# Patient Record
Sex: FEMALE | Race: ASIAN | Employment: FULL TIME | URBAN - METROPOLITAN AREA
[De-identification: names, ages, dates, MRNs, and addresses within clinical notes are randomized per-mention and may not be internally consistent; named-entity substitution may affect disease eponyms.]

---

## 2017-04-15 ENCOUNTER — HOSPITAL ENCOUNTER (OUTPATIENT)
Age: 27
Discharge: HOME | End: 2017-04-15
Payer: COMMERCIAL

## 2017-04-15 VITALS
SYSTOLIC BLOOD PRESSURE: 99 MMHG | OXYGEN SATURATION: 99 % | DIASTOLIC BLOOD PRESSURE: 61 MMHG | RESPIRATION RATE: 20 BRPM | WEIGHT: 118 LBS | HEART RATE: 123 BPM | HEIGHT: 62 IN | TEMPERATURE: 101 F | BODY MASS INDEX: 21.71 KG/M2

## 2017-04-15 DIAGNOSIS — B34.9 VIRAL SYNDROME: Primary | ICD-10-CM

## 2017-04-15 PROCEDURE — 99214 OFFICE O/P EST MOD 30 MIN: CPT

## 2017-04-15 PROCEDURE — 87430 STREP A AG IA: CPT | Performed by: PHYSICIAN ASSISTANT

## 2017-04-15 PROCEDURE — 87081 CULTURE SCREEN ONLY: CPT | Performed by: PHYSICIAN ASSISTANT

## 2017-04-15 RX ORDER — OSELTAMIVIR PHOSPHATE 75 MG/1
75 CAPSULE ORAL 2 TIMES DAILY
Qty: 10 CAPSULE | Refills: 0 | Status: SHIPPED | OUTPATIENT
Start: 2017-04-15 | End: 2017-04-20

## 2017-04-15 NOTE — ED PROVIDER NOTES
Patient Seen in: THE MEDICAL [facility] In [facility]    History   Patient presents with:  Fever  Sore Throat    Stated Complaint: sore throat, fever, body aches    HPI    Patient is a pleasant 25-year-old female.   Yesterday evening, patient had rapid on Temp(Src) 100.5 °F (38.1 °C) (Oral)  Resp 20  Ht 157.5 cm (5' 2\")  Wt 53.524 kg  BMI 21.58 kg/m2  SpO2 99%  LMP 04/13/2017        Physical Exam    Gen: Well appearing, well groomed, alert and aware x 3  Neck: Supple, full range of motion, no thyromegaly o

## 2017-05-16 ENCOUNTER — OFFICE VISIT (OUTPATIENT)
Dept: OBGYN CLINIC | Facility: CLINIC | Age: 27
End: 2017-05-16

## 2017-05-16 VITALS
HEIGHT: 60.5 IN | HEART RATE: 84 BPM | WEIGHT: 121 LBS | BODY MASS INDEX: 23.14 KG/M2 | SYSTOLIC BLOOD PRESSURE: 98 MMHG | DIASTOLIC BLOOD PRESSURE: 56 MMHG

## 2017-05-16 DIAGNOSIS — Z12.39 BREAST CANCER SCREENING: ICD-10-CM

## 2017-05-16 DIAGNOSIS — Z12.4 CERVICAL CANCER SCREENING: ICD-10-CM

## 2017-05-16 DIAGNOSIS — Z01.419 ENCOUNTER FOR GYNECOLOGICAL EXAMINATION WITHOUT ABNORMAL FINDING: Primary | ICD-10-CM

## 2017-05-16 PROCEDURE — 99385 PREV VISIT NEW AGE 18-39: CPT | Performed by: OBSTETRICS & GYNECOLOGY

## 2017-05-16 PROCEDURE — 88175 CYTOPATH C/V AUTO FLUID REDO: CPT | Performed by: OBSTETRICS & GYNECOLOGY

## 2017-05-16 RX ORDER — AMOXICILLIN 875 MG/1
TABLET, COATED ORAL
Refills: 0 | COMMUNITY
Start: 2017-04-22 | End: 2017-05-16 | Stop reason: ALTCHOICE

## 2017-05-16 RX ORDER — OSELTAMIVIR PHOSPHATE 75 MG/1
CAPSULE ORAL
Refills: 0 | COMMUNITY
Start: 2017-04-15 | End: 2017-05-16 | Stop reason: ALTCHOICE

## 2017-05-16 NOTE — PROGRESS NOTES
GYN H&P     2017  4:23 PM    CC: Patient presents with:  Physical: no c/o's or concens      HPI: patient is a 28year old  here for her annual gyne exam.   She has no complaints. Menses are regular.    Denies any pelvic pain or irregular vagin breath and wheezing. Cardiovascular: Negative for chest pain, palpitations and leg swelling.    Gastrointestinal: Negative for nausea, vomiting, abdominal pain, diarrhea, blood in stool   Genitourinary: Negative for dysuria, hematuria and difficulty urina Request; Future    3.  Breast cancer screening  -normal breast exam. Start screening mammogram at age 35      Maurice Fletcher MD

## 2018-05-30 ENCOUNTER — TELEPHONE (OUTPATIENT)
Dept: OBGYN CLINIC | Facility: CLINIC | Age: 28
End: 2018-05-30

## 2018-05-30 NOTE — TELEPHONE ENCOUNTER
LMP: 18  EDC based on lmp: 19  8 wks on 18        Contacted patient. She reports +UPT yesterday. LMP etc as noted above. Denies any medical problems; states \"occasional migraine. \" No medications currently. Taking a PNV.  Encouraged DHA s

## 2018-06-25 ENCOUNTER — OFFICE VISIT (OUTPATIENT)
Dept: OBGYN CLINIC | Facility: CLINIC | Age: 28
End: 2018-06-25

## 2018-06-25 VITALS
BODY MASS INDEX: 23.14 KG/M2 | WEIGHT: 121 LBS | SYSTOLIC BLOOD PRESSURE: 120 MMHG | DIASTOLIC BLOOD PRESSURE: 60 MMHG | HEIGHT: 60.5 IN

## 2018-06-25 DIAGNOSIS — Z34.91 FIRST TRIMESTER PREGNANCY: Primary | ICD-10-CM

## 2018-06-25 DIAGNOSIS — Z11.3 SCREENING EXAMINATION FOR STD (SEXUALLY TRANSMITTED DISEASE): ICD-10-CM

## 2018-06-25 DIAGNOSIS — Z12.4 CERVICAL CANCER SCREENING: ICD-10-CM

## 2018-06-25 PROCEDURE — 87086 URINE CULTURE/COLONY COUNT: CPT | Performed by: OBSTETRICS & GYNECOLOGY

## 2018-06-25 PROCEDURE — 87591 N.GONORRHOEAE DNA AMP PROB: CPT | Performed by: OBSTETRICS & GYNECOLOGY

## 2018-06-25 PROCEDURE — 87491 CHLMYD TRACH DNA AMP PROBE: CPT | Performed by: OBSTETRICS & GYNECOLOGY

## 2018-06-25 RX ORDER — PRENATAL VIT/IRON FUM/FOLIC AC 27MG-0.8MG
1 TABLET ORAL DAILY
COMMUNITY
End: 2021-01-01

## 2018-06-25 NOTE — PROGRESS NOTES
Here for initial prenatal visit with our group. 33year old  at 8w0d by LMP c/w todays US. OB Hx: none  PMH:  migraines  PSxH:none    Patient's last menstrual period was 2018 (exact date). .     Last pap smear Pap Smear,3 Years due on

## 2018-06-26 ENCOUNTER — TELEPHONE (OUTPATIENT)
Dept: OBGYN CLINIC | Facility: CLINIC | Age: 28
End: 2018-06-26

## 2018-06-26 NOTE — TELEPHONE ENCOUNTER
, Reynold Owen called, his wife saw Dr. Barbie Kruse yesterday for new ob, Dr. Barbie Kruse gave  a booklet regarding genetic testing.   His insurance needs CPT code

## 2018-06-27 NOTE — TELEPHONE ENCOUNTER
Patient's  called requesting CPT code for [address]. [place] code of [address] given to patient's . Verbalized understanding. He will call with any questions or concerns.

## 2018-06-29 ENCOUNTER — PATIENT MESSAGE (OUTPATIENT)
Dept: OBGYN CLINIC | Facility: CLINIC | Age: 28
End: 2018-06-29

## 2018-06-29 DIAGNOSIS — Z3A.09 9 WEEKS GESTATION OF PREGNANCY: Primary | ICD-10-CM

## 2018-07-05 NOTE — TELEPHONE ENCOUNTER
From: Kathie Schwartz  To: Edvin Abbasi MD  Sent: 6/29/2018 6:58 PM CDT  Subject: Non-Urgent Medical Question    Hi Doctor,     You have ordered some tests (blood work) on last visit that was June 25.  I was told that the order was sent over

## 2018-07-06 NOTE — TELEPHONE ENCOUNTER
Discussed question with . He states that they want to do Genetic testing outside of [facility] since their insurance doesn't cover genetic testing.   He is okay with patient having her prenatal labs done at [facility] and is planning to go to one of the lab

## 2018-07-19 ENCOUNTER — LAB ENCOUNTER (OUTPATIENT)
Dept: LAB | Age: 28
End: 2018-07-19
Attending: OBSTETRICS & GYNECOLOGY
Payer: COMMERCIAL

## 2018-07-19 DIAGNOSIS — Z34.91 FIRST TRIMESTER PREGNANCY: ICD-10-CM

## 2018-07-19 DIAGNOSIS — Z3A.09 9 WEEKS GESTATION OF PREGNANCY: Primary | ICD-10-CM

## 2018-07-19 LAB
ANTIBODY SCREEN: NEGATIVE
HBV SURFACE AG SER-ACNC: <0.1 [IU]/L
HBV SURFACE AG SERPL QL IA: NONREACTIVE
RH BLOOD TYPE: POSITIVE
RUBV IGG SER QL: POSITIVE
RUBV IGG SER-ACNC: 182.1 IU/ML (ref 10–?)
T PALLIDUM AB SER QL IA: NONREACTIVE

## 2018-07-19 PROCEDURE — 86900 BLOOD TYPING SEROLOGIC ABO: CPT | Performed by: OBSTETRICS & GYNECOLOGY

## 2018-07-19 PROCEDURE — 87340 HEPATITIS B SURFACE AG IA: CPT | Performed by: OBSTETRICS & GYNECOLOGY

## 2018-07-19 PROCEDURE — 36415 COLL VENOUS BLD VENIPUNCTURE: CPT | Performed by: OBSTETRICS & GYNECOLOGY

## 2018-07-19 PROCEDURE — 86901 BLOOD TYPING SEROLOGIC RH(D): CPT | Performed by: OBSTETRICS & GYNECOLOGY

## 2018-07-19 PROCEDURE — 86780 TREPONEMA PALLIDUM: CPT | Performed by: OBSTETRICS & GYNECOLOGY

## 2018-07-19 PROCEDURE — 86850 RBC ANTIBODY SCREEN: CPT | Performed by: OBSTETRICS & GYNECOLOGY

## 2018-07-19 PROCEDURE — 86762 RUBELLA ANTIBODY: CPT | Performed by: OBSTETRICS & GYNECOLOGY

## 2018-07-19 PROCEDURE — 87389 HIV-1 AG W/HIV-1&-2 AB AG IA: CPT | Performed by: OBSTETRICS & GYNECOLOGY

## 2018-07-23 PROBLEM — Z34.91 ENCOUNTER FOR SUPERVISION OF NORMAL PREGNANCY IN FIRST TRIMESTER: Status: ACTIVE | Noted: 2018-07-23

## 2018-07-23 PROBLEM — Z34.91 ENCOUNTER FOR SUPERVISION OF NORMAL PREGNANCY IN FIRST TRIMESTER (HCC): Status: ACTIVE | Noted: 2018-07-23

## 2018-07-24 ENCOUNTER — TELEPHONE (OUTPATIENT)
Dept: OBGYN CLINIC | Facility: CLINIC | Age: 28
End: 2018-07-24

## 2018-07-24 ENCOUNTER — ROUTINE PRENATAL (OUTPATIENT)
Dept: OBGYN CLINIC | Facility: CLINIC | Age: 28
End: 2018-07-24
Payer: COMMERCIAL

## 2018-07-24 VITALS — SYSTOLIC BLOOD PRESSURE: 90 MMHG | BODY MASS INDEX: 23 KG/M2 | DIASTOLIC BLOOD PRESSURE: 54 MMHG | WEIGHT: 120 LBS

## 2018-07-24 DIAGNOSIS — Z34.91 ENCOUNTER FOR SUPERVISION OF NORMAL PREGNANCY IN FIRST TRIMESTER, UNSPECIFIED GRAVIDITY: Primary | ICD-10-CM

## 2018-07-24 NOTE — PROGRESS NOTES
LEXIS    Doing well. No complaints. Denies abdominal/pelvic pain or vaginal bleeding.    Rh positive   Genetic screening cffDNA negative  Recommend Anatomy scan 18-20 wks   Prenatal labs reviewed     RTC in 4 weeks

## 2018-08-21 ENCOUNTER — ROUTINE PRENATAL (OUTPATIENT)
Dept: OBGYN CLINIC | Facility: CLINIC | Age: 28
End: 2018-08-21
Payer: COMMERCIAL

## 2018-08-21 VITALS
SYSTOLIC BLOOD PRESSURE: 90 MMHG | BODY MASS INDEX: 23.91 KG/M2 | HEART RATE: 87 BPM | HEIGHT: 60.5 IN | DIASTOLIC BLOOD PRESSURE: 70 MMHG | WEIGHT: 125 LBS

## 2018-08-21 DIAGNOSIS — Z3A.16 16 WEEKS GESTATION OF PREGNANCY: Primary | ICD-10-CM

## 2018-08-21 DIAGNOSIS — Z34.02 ENCOUNTER FOR SUPERVISION OF NORMAL FIRST PREGNANCY IN SECOND TRIMESTER: ICD-10-CM

## 2018-08-21 NOTE — PATIENT INSTRUCTIONS
Medications Safe in Pregnancy  The following over-the-counter medications may be taken safely after 12 weeks gestation by any patient who is pregnant. Please follow the instructions on the package for adult dosage.   If you experience any symptoms christianne

## 2018-08-21 NOTE — PROGRESS NOTES
LEXIS  Doing well  No complaints.  No LOF/VB/uctx  Some allergies--otc meds reviewed  RH +  Genetic testing nl cf dna (first, quad/AFP)  Anatomy Scan on rtc (18-20weeks)

## 2018-09-21 ENCOUNTER — ROUTINE PRENATAL (OUTPATIENT)
Dept: OBGYN CLINIC | Facility: CLINIC | Age: 28
End: 2018-09-21
Payer: COMMERCIAL

## 2018-09-21 ENCOUNTER — APPOINTMENT (OUTPATIENT)
Dept: OBGYN CLINIC | Facility: CLINIC | Age: 28
End: 2018-09-21
Payer: COMMERCIAL

## 2018-09-21 VITALS — WEIGHT: 127 LBS | SYSTOLIC BLOOD PRESSURE: 96 MMHG | DIASTOLIC BLOOD PRESSURE: 70 MMHG | BODY MASS INDEX: 24 KG/M2

## 2018-09-21 DIAGNOSIS — Z36.9 ENCOUNTER FOR ANTENATAL SCREENING OF MOTHER: ICD-10-CM

## 2018-09-21 DIAGNOSIS — Z36.3 ANTENATAL SCREENING FOR MALFORMATION USING ULTRASONICS: ICD-10-CM

## 2018-09-21 DIAGNOSIS — Z23 NEED FOR VACCINATION: ICD-10-CM

## 2018-09-21 DIAGNOSIS — Z34.02 ENCOUNTER FOR SUPERVISION OF NORMAL FIRST PREGNANCY IN SECOND TRIMESTER: Primary | ICD-10-CM

## 2018-09-21 PROCEDURE — 76805 OB US >/= 14 WKS SNGL FETUS: CPT | Performed by: OBSTETRICS & GYNECOLOGY

## 2018-09-21 PROCEDURE — 90686 IIV4 VACC NO PRSV 0.5 ML IM: CPT | Performed by: OBSTETRICS & GYNECOLOGY

## 2018-09-21 PROCEDURE — 90471 IMMUNIZATION ADMIN: CPT | Performed by: OBSTETRICS & GYNECOLOGY

## 2018-09-21 NOTE — PROGRESS NOTES
Here for second trimester screening ultrasound. AUCATHY EGA 20w 3d giving ultrasound PAULINO of February 5, 2019. Cervical length 4.2 cm. Anterior placenta without previa. AFV normal.  Anatomical survery unremarkable.   Follow up ultrasound as clinically neede

## 2018-09-21 NOTE — PROGRESS NOTES
No problems since last seen. Not sure on FM. Unremarkable screening ultrasound. Carrying female. Flu vaccine received. GL next time. See in one month.

## 2018-10-17 ENCOUNTER — ROUTINE PRENATAL (OUTPATIENT)
Dept: OBGYN CLINIC | Facility: CLINIC | Age: 28
End: 2018-10-17
Payer: COMMERCIAL

## 2018-10-17 VITALS
DIASTOLIC BLOOD PRESSURE: 58 MMHG | BODY MASS INDEX: 25.44 KG/M2 | SYSTOLIC BLOOD PRESSURE: 100 MMHG | WEIGHT: 133 LBS | HEIGHT: 60.5 IN

## 2018-10-17 DIAGNOSIS — Z34.02 ENCOUNTER FOR SUPERVISION OF NORMAL FIRST PREGNANCY IN SECOND TRIMESTER: Primary | ICD-10-CM

## 2018-10-24 ENCOUNTER — LAB ENCOUNTER (OUTPATIENT)
Dept: LAB | Age: 28
End: 2018-10-24
Attending: OBSTETRICS & GYNECOLOGY
Payer: COMMERCIAL

## 2018-10-24 DIAGNOSIS — O99.810 ABNORMAL MATERNAL GLUCOSE TOLERANCE, ANTEPARTUM: Primary | ICD-10-CM

## 2018-10-24 DIAGNOSIS — Z36.9 ENCOUNTER FOR ANTENATAL SCREENING OF MOTHER: ICD-10-CM

## 2018-10-24 PROCEDURE — 36415 COLL VENOUS BLD VENIPUNCTURE: CPT | Performed by: OBSTETRICS & GYNECOLOGY

## 2018-10-24 PROCEDURE — 85025 COMPLETE CBC W/AUTO DIFF WBC: CPT | Performed by: OBSTETRICS & GYNECOLOGY

## 2018-10-24 PROCEDURE — 82950 GLUCOSE TEST: CPT | Performed by: OBSTETRICS & GYNECOLOGY

## 2018-10-24 NOTE — PROGRESS NOTES
Patient informed of results. Verbalized understanding. No further questions or concerns. Order placed.

## 2018-10-27 ENCOUNTER — LABORATORY ENCOUNTER (OUTPATIENT)
Dept: LAB | Facility: HOSPITAL | Age: 28
End: 2018-10-27
Attending: OBSTETRICS & GYNECOLOGY
Payer: COMMERCIAL

## 2018-10-27 DIAGNOSIS — O99.810 ABNORMAL MATERNAL GLUCOSE TOLERANCE, ANTEPARTUM: ICD-10-CM

## 2018-10-27 PROCEDURE — 82952 GTT-ADDED SAMPLES: CPT

## 2018-10-27 PROCEDURE — 36415 COLL VENOUS BLD VENIPUNCTURE: CPT

## 2018-10-27 PROCEDURE — 82962 GLUCOSE BLOOD TEST: CPT

## 2018-10-27 PROCEDURE — 82951 GLUCOSE TOLERANCE TEST (GTT): CPT

## 2018-11-14 ENCOUNTER — ROUTINE PRENATAL (OUTPATIENT)
Dept: OBGYN CLINIC | Facility: CLINIC | Age: 28
End: 2018-11-14
Payer: COMMERCIAL

## 2018-11-14 VITALS — DIASTOLIC BLOOD PRESSURE: 66 MMHG | SYSTOLIC BLOOD PRESSURE: 104 MMHG | BODY MASS INDEX: 26 KG/M2 | WEIGHT: 135 LBS

## 2018-11-14 DIAGNOSIS — Z34.03 ENCOUNTER FOR SUPERVISION OF NORMAL FIRST PREGNANCY IN THIRD TRIMESTER: ICD-10-CM

## 2018-11-14 DIAGNOSIS — Z3A.28 28 WEEKS GESTATION OF PREGNANCY: Primary | ICD-10-CM

## 2018-11-14 NOTE — PROGRESS NOTES
LEXIS  Doing well  RH +  S/P Anatomy scan nl  1 hr glucose (24-28wks) nl 3 hr gtt  TDAP recommended during pregnancy and instructions given, for next visit  RTC 2 wks

## 2018-11-21 ENCOUNTER — TELEPHONE (OUTPATIENT)
Dept: OBGYN CLINIC | Facility: CLINIC | Age: 28
End: 2018-11-21

## 2018-11-21 ENCOUNTER — HOSPITAL ENCOUNTER (OUTPATIENT)
Age: 28
Discharge: HOME | End: 2018-11-21
Attending: FAMILY MEDICINE
Payer: COMMERCIAL

## 2018-11-21 VITALS
BODY MASS INDEX: 24.84 KG/M2 | OXYGEN SATURATION: 98 % | RESPIRATION RATE: 18 BRPM | SYSTOLIC BLOOD PRESSURE: 109 MMHG | HEIGHT: 62 IN | WEIGHT: 135 LBS | DIASTOLIC BLOOD PRESSURE: 59 MMHG | HEART RATE: 97 BPM | TEMPERATURE: 98 F

## 2018-11-21 DIAGNOSIS — Z34.92 SECOND TRIMESTER PREGNANCY: ICD-10-CM

## 2018-11-21 DIAGNOSIS — K21.9 GASTROESOPHAGEAL REFLUX DISEASE, ESOPHAGITIS PRESENCE NOT SPECIFIED: ICD-10-CM

## 2018-11-21 DIAGNOSIS — K52.9 GASTROENTERITIS: Primary | ICD-10-CM

## 2018-11-21 PROCEDURE — 99215 OFFICE O/P EST HI 40 MIN: CPT

## 2018-11-21 PROCEDURE — 99214 OFFICE O/P EST MOD 30 MIN: CPT

## 2018-11-21 PROCEDURE — 87086 URINE CULTURE/COLONY COUNT: CPT

## 2018-11-21 PROCEDURE — 85025 COMPLETE CBC W/AUTO DIFF WBC: CPT | Performed by: FAMILY MEDICINE

## 2018-11-21 PROCEDURE — 81002 URINALYSIS NONAUTO W/O SCOPE: CPT

## 2018-11-21 PROCEDURE — 96360 HYDRATION IV INFUSION INIT: CPT

## 2018-11-21 PROCEDURE — 80047 BASIC METABLC PNL IONIZED CA: CPT

## 2018-11-21 RX ORDER — SODIUM CHLORIDE 9 MG/ML
1000 INJECTION, SOLUTION INTRAVENOUS ONCE
Status: COMPLETED | OUTPATIENT
Start: 2018-11-21 | End: 2018-11-21

## 2018-11-21 RX ORDER — ONDANSETRON 4 MG/1
4 TABLET, ORALLY DISINTEGRATING ORAL ONCE
Status: COMPLETED | OUTPATIENT
Start: 2018-11-21 | End: 2018-11-21

## 2018-11-21 RX ORDER — ONDANSETRON 4 MG/1
4 TABLET, ORALLY DISINTEGRATING ORAL EVERY 6 HOURS PRN
Qty: 10 TABLET | Refills: 0 | Status: SHIPPED | OUTPATIENT
Start: 2018-11-21 | End: 2018-11-24

## 2018-11-21 NOTE — TELEPHONE ENCOUNTER
29W2D called c/o diarrhea. She states she has had 4 episodes since 3 AM. She states she had a falafel sandwich and milk before bed. She is able to drink water at this time. She states the baby is moving well.  Denies any vaginal bleeding, cramping, or

## 2018-11-21 NOTE — ED INITIAL ASSESSMENT (HPI)
C/O abdominal pain and diarrhea that started during the night. Has tried Tylenol w/o any relief of pain. Is tolerating fluids.

## 2018-11-21 NOTE — ED PROVIDER NOTES
Patient Seen in: THE [facility] In [facility]    History   Patient presents with:  Diarrhea  Abdominal Pain    Stated Complaint: patient is 29 weeks pregnant with abd pain and diar since 3 am    HPI  32 yo F here with complaints of watery diarrhe hyperactive BS+, no tenderness that can be localized at this time.  Not distended, Diffuse lower abdominal tenderness +   NEURO: Alert and oriented to person place and time  GAIT: Normal      ED Course     Labs Reviewed   POCT URINALYSIS DIPSTICK - Abnormal Signs of dehydration discussed and if so please return immediately   BRAT diet emphasized - Bananas, Rice, Applesauce and Toast for the next 48 hours and then advance diet slowly   OTC Probiotic Culturelle OR Florastor discussed     If continuing watery

## 2018-11-21 NOTE — ED NOTES
Pt states she is having pain in epigastric area now. Dr. Yolanda Galeana aware. Warm blanket  provided. Pt made comfortable. Bed on low fowlers. Lights dimmed. 1 side rail up. Call light within reach.   IV infusing well

## 2018-11-28 ENCOUNTER — TELEPHONE (OUTPATIENT)
Dept: OBGYN CLINIC | Facility: CLINIC | Age: 28
End: 2018-11-28

## 2018-11-28 ENCOUNTER — ROUTINE PRENATAL (OUTPATIENT)
Dept: OBGYN CLINIC | Facility: CLINIC | Age: 28
End: 2018-11-28
Payer: COMMERCIAL

## 2018-11-28 ENCOUNTER — APPOINTMENT (OUTPATIENT)
Dept: LAB | Age: 28
End: 2018-11-28
Attending: OBSTETRICS & GYNECOLOGY
Payer: COMMERCIAL

## 2018-11-28 VITALS — BODY MASS INDEX: 25 KG/M2 | SYSTOLIC BLOOD PRESSURE: 108 MMHG | WEIGHT: 136 LBS | DIASTOLIC BLOOD PRESSURE: 60 MMHG

## 2018-11-28 DIAGNOSIS — Z34.03 ENCOUNTER FOR SUPERVISION OF NORMAL FIRST PREGNANCY IN THIRD TRIMESTER: Primary | ICD-10-CM

## 2018-11-28 DIAGNOSIS — Z34.03 ENCOUNTER FOR SUPERVISION OF NORMAL FIRST PREGNANCY IN THIRD TRIMESTER: ICD-10-CM

## 2018-11-28 DIAGNOSIS — Z23 NEED FOR VACCINATION: ICD-10-CM

## 2018-11-28 PROCEDURE — 36415 COLL VENOUS BLD VENIPUNCTURE: CPT | Performed by: OBSTETRICS & GYNECOLOGY

## 2018-11-28 PROCEDURE — 87389 HIV-1 AG W/HIV-1&-2 AB AG IA: CPT | Performed by: OBSTETRICS & GYNECOLOGY

## 2018-11-28 PROCEDURE — 90471 IMMUNIZATION ADMIN: CPT | Performed by: OBSTETRICS & GYNECOLOGY

## 2018-11-28 PROCEDURE — 90715 TDAP VACCINE 7 YRS/> IM: CPT | Performed by: OBSTETRICS & GYNECOLOGY

## 2018-11-28 NOTE — TELEPHONE ENCOUNTER
Patient dropped off in [address] forms. Pd $25.00 visa. Please fax forms and pt will  at next appt.  Put in bin in [place] for completion

## 2018-12-06 ENCOUNTER — MED REC SCAN ONLY (OUTPATIENT)
Dept: OBGYN CLINIC | Facility: CLINIC | Age: 28
End: 2018-12-06

## 2018-12-06 NOTE — TELEPHONE ENCOUNTER
Forms completed and signed by Dr. Prem Dempsey.      Faxed to [place] at [phone]    Copy to scan  Copy to file in [place]    Copy faxed to [place] for patient to  at [facility] on 12/14/18

## 2018-12-14 ENCOUNTER — ROUTINE PRENATAL (OUTPATIENT)
Dept: OBGYN CLINIC | Facility: CLINIC | Age: 28
End: 2018-12-14
Payer: COMMERCIAL

## 2018-12-14 VITALS — WEIGHT: 139 LBS | SYSTOLIC BLOOD PRESSURE: 110 MMHG | BODY MASS INDEX: 25 KG/M2 | DIASTOLIC BLOOD PRESSURE: 56 MMHG

## 2018-12-14 DIAGNOSIS — Z34.03 ENCOUNTER FOR SUPERVISION OF NORMAL FIRST PREGNANCY IN THIRD TRIMESTER: Primary | ICD-10-CM

## 2018-12-28 ENCOUNTER — ROUTINE PRENATAL (OUTPATIENT)
Dept: OBGYN CLINIC | Facility: CLINIC | Age: 28
End: 2018-12-28
Payer: COMMERCIAL

## 2018-12-28 VITALS — SYSTOLIC BLOOD PRESSURE: 108 MMHG | BODY MASS INDEX: 26 KG/M2 | WEIGHT: 143 LBS | DIASTOLIC BLOOD PRESSURE: 68 MMHG

## 2018-12-28 DIAGNOSIS — Z3A.34 34 WEEKS GESTATION OF PREGNANCY: Primary | ICD-10-CM

## 2018-12-28 DIAGNOSIS — Z34.03 ENCOUNTER FOR SUPERVISION OF NORMAL FIRST PREGNANCY IN THIRD TRIMESTER: ICD-10-CM

## 2018-12-28 NOTE — PROGRESS NOTES
LEXIS  Doing well, +FM  Denies VB/LOF/uctx  Rh +, TDAP received, EPDS  RTC in 1 wks  Fetal movement instructions given  gbbs on rtc

## 2018-12-28 NOTE — PATIENT INSTRUCTIONS
FETAL MOVEMENT CHART    Begin counting fetal movements at 32 weeks of pregnancy. You may find that your baby is more active after eating or drinking. We want you to time how long it takes to feel 10 movements (kicks, flutters, swishes or rolls).   Wyatt Morales

## 2019-01-04 ENCOUNTER — ROUTINE PRENATAL (OUTPATIENT)
Dept: OBGYN CLINIC | Facility: CLINIC | Age: 29
End: 2019-01-04
Payer: COMMERCIAL

## 2019-01-04 VITALS — BODY MASS INDEX: 26 KG/M2 | SYSTOLIC BLOOD PRESSURE: 104 MMHG | DIASTOLIC BLOOD PRESSURE: 68 MMHG | WEIGHT: 143 LBS

## 2019-01-04 DIAGNOSIS — Z34.03 ENCOUNTER FOR SUPERVISION OF NORMAL FIRST PREGNANCY IN THIRD TRIMESTER: Primary | ICD-10-CM

## 2019-01-04 PROCEDURE — 87081 CULTURE SCREEN ONLY: CPT | Performed by: NURSE PRACTITIONER

## 2019-01-04 PROCEDURE — 87653 STREP B DNA AMP PROBE: CPT | Performed by: NURSE PRACTITIONER

## 2019-01-04 NOTE — PROGRESS NOTES
LEXIS  Doing well, +FM  Denies VB/LOF/uctx  Mode of delivery:  anticipated  Labor precautions discussed  GBS collected   RTC 1 week

## 2019-01-09 ENCOUNTER — ROUTINE PRENATAL (OUTPATIENT)
Dept: OBGYN CLINIC | Facility: CLINIC | Age: 29
End: 2019-01-09
Payer: COMMERCIAL

## 2019-01-09 VITALS — WEIGHT: 143 LBS | SYSTOLIC BLOOD PRESSURE: 100 MMHG | BODY MASS INDEX: 26 KG/M2 | DIASTOLIC BLOOD PRESSURE: 60 MMHG

## 2019-01-09 DIAGNOSIS — Z3A.36 36 WEEKS GESTATION OF PREGNANCY: Primary | ICD-10-CM

## 2019-01-09 NOTE — PROGRESS NOTES
LEXIS  Doing well, +FM  Denies VB/LOF/uctx  Mode of delivery:   anticipated  SVE deferred   GBS neg  RTC 1 week  Size less than dates.  Growth US on LEXIS

## 2019-01-17 ENCOUNTER — ROUTINE PRENATAL (OUTPATIENT)
Dept: OBGYN CLINIC | Facility: CLINIC | Age: 29
End: 2019-01-17
Payer: COMMERCIAL

## 2019-01-17 ENCOUNTER — ULTRASOUND ENCOUNTER (OUTPATIENT)
Dept: OBGYN CLINIC | Facility: CLINIC | Age: 29
End: 2019-01-17
Payer: COMMERCIAL

## 2019-01-17 VITALS
HEIGHT: 61.5 IN | WEIGHT: 146.81 LBS | DIASTOLIC BLOOD PRESSURE: 62 MMHG | BODY MASS INDEX: 27.36 KG/M2 | SYSTOLIC BLOOD PRESSURE: 100 MMHG

## 2019-01-17 DIAGNOSIS — Z34.01 ENCOUNTER FOR SUPERVISION OF NORMAL FIRST PREGNANCY IN FIRST TRIMESTER: Primary | ICD-10-CM

## 2019-01-17 DIAGNOSIS — O26.849 FETAL SIZE INCONSISTENT WITH DATES: ICD-10-CM

## 2019-01-17 PROCEDURE — 76816 OB US FOLLOW-UP PER FETUS: CPT | Performed by: OBSTETRICS & GYNECOLOGY

## 2019-01-17 PROCEDURE — 76819 FETAL BIOPHYS PROFIL W/O NST: CPT | Performed by: OBSTETRICS & GYNECOLOGY

## 2019-01-17 NOTE — PROGRESS NOTES
LEXIS  Doing well, +FM  Denies LOF/VB/uctx  Mode of delivery:  anticipated  SVE 0/50/-3  GBS neg  Fetal movement count given  Size less than dates, growth scan today 35th%. KERRIE 7.43 cm. Repeat in 1 week. Encourage PO hydration. BPP 8/8.      RTC 1 week

## 2019-01-17 NOTE — PATIENT INSTRUCTIONS
Labor Instructions    How do I know if it’s true labor? • One of the most important aspects of any pregnancy is being able to recognize the onset of labor.   Unfortunately, on occasion it can be difficult or confusing, especially if you have had one or mor of the contractions. Having regular (usually closer), longer lasting (35-70 seconds), and sharper (more painful) contractions are the common symptoms of actual labor.   The second way in which labor can begin which occurs in approximately 30% of all patien the room during your labor. During the delivery, the nurses will inform you of the hospital policy and how many coaches are allowed. You may desire pain medication or anesthesia for pain.   You probably discussed some aspects of pain medication with us dur Please call the office within a few days after you are discharged from the hospital to schedule your post-partum visit, which is usually 4-6 weeks after delivery. Any medications necessary will be discussed on an individual basis.   If you decide to willam Time M T W Th F S S                                                                                                           Time M T W Th

## 2019-01-24 ENCOUNTER — ROUTINE PRENATAL (OUTPATIENT)
Dept: OBGYN CLINIC | Facility: CLINIC | Age: 29
End: 2019-01-24
Payer: COMMERCIAL

## 2019-01-24 VITALS
SYSTOLIC BLOOD PRESSURE: 102 MMHG | HEIGHT: 61.5 IN | WEIGHT: 146.38 LBS | DIASTOLIC BLOOD PRESSURE: 60 MMHG | BODY MASS INDEX: 27.28 KG/M2

## 2019-01-24 DIAGNOSIS — Z3A.38 38 WEEKS GESTATION OF PREGNANCY: Primary | ICD-10-CM

## 2019-01-24 DIAGNOSIS — O28.8 AFI (AMNIOTIC FLUID INDEX) BORDERLINE LOW: ICD-10-CM

## 2019-01-24 PROCEDURE — 76819 FETAL BIOPHYS PROFIL W/O NST: CPT | Performed by: OBSTETRICS & GYNECOLOGY

## 2019-01-24 NOTE — PATIENT INSTRUCTIONS
Labor Instructions    How do I know if it’s true labor? • One of the most important aspects of any pregnancy is being able to recognize the onset of labor.   Unfortunately, on occasion it can be difficult or confusing, especially if you have had one or mor of the contractions. Having regular (usually closer), longer lasting (35-70 seconds), and sharper (more painful) contractions are the common symptoms of actual labor.   The second way in which labor can begin which occurs in approximately 30% of all patien care.  The various options may also have been discussed in Prenatal Classes. We utilize IV narcotics and epidural anesthesia when our patients request to have them. If you chose to have no anesthesia, none will be administered.   A local anesthetic may be you should continue your prenatal vitamins. If you do not breastfeed, simply finish the prenatal vitamins you have. The staff at [facility] wish you a joyous and exciting birth.   If there is anything we can do to make this a better e

## 2019-02-01 ENCOUNTER — ROUTINE PRENATAL (OUTPATIENT)
Dept: OBGYN CLINIC | Facility: CLINIC | Age: 29
End: 2019-02-01
Payer: COMMERCIAL

## 2019-02-01 ENCOUNTER — HOSPITAL ENCOUNTER (INPATIENT)
Facility: HOSPITAL | Age: 29
LOS: 3 days | Discharge: HOME | End: 2019-02-04
Attending: OBSTETRICS & GYNECOLOGY | Admitting: OBSTETRICS & GYNECOLOGY
Payer: COMMERCIAL

## 2019-02-01 VITALS
DIASTOLIC BLOOD PRESSURE: 66 MMHG | SYSTOLIC BLOOD PRESSURE: 118 MMHG | WEIGHT: 150.63 LBS | HEIGHT: 61.5 IN | BODY MASS INDEX: 28.07 KG/M2

## 2019-02-01 DIAGNOSIS — Z34.03 ENCOUNTER FOR SUPERVISION OF NORMAL FIRST PREGNANCY IN THIRD TRIMESTER: Primary | ICD-10-CM

## 2019-02-01 DIAGNOSIS — O26.843 SIZE OF FETUS INCONSISTENT WITH DATES IN THIRD TRIMESTER: ICD-10-CM

## 2019-02-01 PROBLEM — Z34.90 PREGNANCY (HCC): Status: ACTIVE | Noted: 2019-02-01

## 2019-02-01 PROBLEM — Z34.90 PREGNANCY: Status: ACTIVE | Noted: 2019-02-01

## 2019-02-01 LAB
ANTIBODY SCREEN: NEGATIVE
DEPRECATED RDW RBC AUTO: 41 FL (ref 35.1–46.3)
ERYTHROCYTE [DISTWIDTH] IN BLOOD BY AUTOMATED COUNT: 13.5 % (ref 11–15)
HCT VFR BLD AUTO: 35.3 % (ref 35–48)
HGB BLD-MCNC: 11.8 G/DL (ref 12–16)
MCH RBC QN AUTO: 28 PG (ref 26–34)
MCHC RBC AUTO-ENTMCNC: 33.4 G/DL (ref 31–37)
MCV RBC AUTO: 83.6 FL (ref 80–100)
PLATELET # BLD AUTO: 253 10(3)UL (ref 150–450)
RBC # BLD AUTO: 4.22 X10(6)UL (ref 3.8–5.3)
RH BLOOD TYPE: POSITIVE
T PALLIDUM AB SER QL IA: NONREACTIVE
WBC # BLD AUTO: 10.4 X10(3) UL (ref 4–11)

## 2019-02-01 PROCEDURE — 3E033VJ INTRODUCTION OF OTHER HORMONE INTO PERIPHERAL VEIN, PERCUTANEOUS APPROACH: ICD-10-PCS | Performed by: OBSTETRICS & GYNECOLOGY

## 2019-02-01 PROCEDURE — 10907ZC DRAINAGE OF AMNIOTIC FLUID, THERAPEUTIC FROM PRODUCTS OF CONCEPTION, VIA NATURAL OR ARTIFICIAL OPENING: ICD-10-PCS | Performed by: OBSTETRICS & GYNECOLOGY

## 2019-02-01 PROCEDURE — 76815 OB US LIMITED FETUS(S): CPT | Performed by: NURSE PRACTITIONER

## 2019-02-01 RX ORDER — DEXTROSE, SODIUM CHLORIDE, SODIUM LACTATE, POTASSIUM CHLORIDE, AND CALCIUM CHLORIDE 5; .6; .31; .03; .02 G/100ML; G/100ML; G/100ML; G/100ML; G/100ML
INJECTION, SOLUTION INTRAVENOUS CONTINUOUS
Status: DISCONTINUED | OUTPATIENT
Start: 2019-02-01 | End: 2019-02-02

## 2019-02-01 RX ORDER — TRISODIUM CITRATE DIHYDRATE AND CITRIC ACID MONOHYDRATE 500; 334 MG/5ML; MG/5ML
30 SOLUTION ORAL AS NEEDED
Status: DISCONTINUED | OUTPATIENT
Start: 2019-02-01 | End: 2019-02-02

## 2019-02-01 RX ORDER — SODIUM CHLORIDE, SODIUM LACTATE, POTASSIUM CHLORIDE, CALCIUM CHLORIDE 600; 310; 30; 20 MG/100ML; MG/100ML; MG/100ML; MG/100ML
INJECTION, SOLUTION INTRAVENOUS CONTINUOUS
Status: DISCONTINUED | OUTPATIENT
Start: 2019-02-01 | End: 2019-02-02

## 2019-02-01 RX ORDER — TERBUTALINE SULFATE 1 MG/ML
0.25 INJECTION, SOLUTION SUBCUTANEOUS AS NEEDED
Status: DISCONTINUED | OUTPATIENT
Start: 2019-02-01 | End: 2019-02-02

## 2019-02-01 RX ORDER — NALBUPHINE HCL 10 MG/ML
2.5 AMPUL (ML) INJECTION
Status: DISCONTINUED | OUTPATIENT
Start: 2019-02-01 | End: 2019-02-02

## 2019-02-01 RX ORDER — AMMONIA INHALANTS 0.04 G/.3ML
0.3 INHALANT RESPIRATORY (INHALATION) AS NEEDED
Status: DISCONTINUED | OUTPATIENT
Start: 2019-02-01 | End: 2019-02-02

## 2019-02-01 RX ORDER — EPHEDRINE SULFATE/0.9% NACL/PF 25 MG/5 ML
5 SYRINGE (ML) INTRAVENOUS AS NEEDED
Status: DISCONTINUED | OUTPATIENT
Start: 2019-02-01 | End: 2019-02-02

## 2019-02-01 RX ORDER — IBUPROFEN 600 MG/1
600 TABLET ORAL ONCE AS NEEDED
Status: DISCONTINUED | OUTPATIENT
Start: 2019-02-01 | End: 2019-02-02

## 2019-02-01 NOTE — PROGRESS NOTES
LEXIS  Doing well, +FM  Denies VB/LOF/uctx  Mode of delivery:   anticipated  Labor precautions discussed  RTC 3 days with NST  Emphasized the importance of fetal movement

## 2019-02-01 NOTE — H&P
[facility]  Obstetrics and Gynecology  History & Physical    [address] Sadia Michael Grace Nazariojoeabdoul Patient Status:  Inpatient    1990 MRN [id number]   Location [address] [place] [address] Attending GuiFigueroa Sharma [facility] Day 0 PCP Bk VELAZCO Si Father      Social History: Social History    Tobacco Use      Smoking status: Never Smoker      Smokeless tobacco: Never Used    Alcohol use: No      Alcohol/week: 0.0 oz      Home Meds:   Medications Prior to Admission:  PRENATAL 27-0.8 MG Oral Tab Take detail. All questions answered, all appropriate consents will be signed at the Hospital. Admission is planned for today.  anticipated.     MD Siobhan   [address]

## 2019-02-01 NOTE — PLAN OF CARE
Problem: Patient/Family Goals  Goal: Patient/Family Long Term Goal  Patient's Long Term Goal: Safe delivery of infant    Interventions:  - See additional Care Plan goals for specific interventions  Outcome: Progressing    Goal: Patient/Family Short Term Go

## 2019-02-01 NOTE — PROGRESS NOTES
Pt is a 26year old female admitted to 105/-A. Patient presents with:  Scheduled Induction     Pt is  39w4d intra-uterine pregnancy. History obtained, consents signed.  Oriented to room, staff, and plan of care  Pt sent from Office today to LDR

## 2019-02-02 PROCEDURE — 59400 OBSTETRICAL CARE: CPT | Performed by: OBSTETRICS & GYNECOLOGY

## 2019-02-02 PROCEDURE — 0KQM0ZZ REPAIR PERINEUM MUSCLE, OPEN APPROACH: ICD-10-PCS | Performed by: OBSTETRICS & GYNECOLOGY

## 2019-02-02 RX ORDER — SIMETHICONE 80 MG
80 TABLET,CHEWABLE ORAL 3 TIMES DAILY PRN
Status: DISCONTINUED | OUTPATIENT
Start: 2019-02-02 | End: 2019-02-04

## 2019-02-02 RX ORDER — BISACODYL 10 MG
10 SUPPOSITORY, RECTAL RECTAL ONCE AS NEEDED
Status: ACTIVE | OUTPATIENT
Start: 2019-02-02 | End: 2019-02-02

## 2019-02-02 RX ORDER — IBUPROFEN 600 MG/1
600 TABLET ORAL EVERY 6 HOURS
Status: DISCONTINUED | OUTPATIENT
Start: 2019-02-02 | End: 2019-02-04

## 2019-02-02 RX ORDER — ACETAMINOPHEN 325 MG/1
650 TABLET ORAL EVERY 6 HOURS PRN
Status: DISCONTINUED | OUTPATIENT
Start: 2019-02-02 | End: 2019-02-04

## 2019-02-02 RX ORDER — ZOLPIDEM TARTRATE 5 MG/1
5 TABLET ORAL NIGHTLY PRN
Status: DISCONTINUED | OUTPATIENT
Start: 2019-02-02 | End: 2019-02-04

## 2019-02-02 RX ORDER — DOCUSATE SODIUM 100 MG/1
100 CAPSULE, LIQUID FILLED ORAL
Status: DISCONTINUED | OUTPATIENT
Start: 2019-02-02 | End: 2019-02-04

## 2019-02-02 NOTE — PROGRESS NOTES
Comfortable with some rectal pressure  fht cat I   Ctx q 2 min   Sve: complete with caput at +2  Will start pushing after louise catheter removed.

## 2019-02-02 NOTE — L&D DELIVERY NOTE
Vaginal Delivery Note          Jeramy Horowitz Patient Status:  Inpatient    1990 MRN [id number]   Location [address] [facility] Attending Devin Worrell, [address] [facility] [address] Day # 1 PCP García Harrison MD     Date of Delivery: 19 vicryl. Bleeding was minimal.  The patient was then moved to the supine position in stable condition. Sponge and instrument counts were correct. Complications:  None     Mother and infant in good condition.     MD Siobhan   [address]

## 2019-02-02 NOTE — PROGRESS NOTES
Pt comfortable with epidural   Ctx q 2-3 min   fht cat I   Sve: 5/100/-1   Continue pit   Peanut ball

## 2019-02-02 NOTE — PROGRESS NOTES
ADMISSION NOTE  Patient admitted to room in stable condition via: wheelchair    Oriented to room, Safety precautions initiated. Bed in low position, call light in reach.  Report received and ID Bands checked & verified with: Rober Scott RN  Plan of care and safe

## 2019-02-02 NOTE — PROGRESS NOTES
Pt transferred to Mother Baby room 5475 in stable condition. Report given to Riley. Infant transferred with mother in stable condition.

## 2019-02-03 LAB
BASOPHILS # BLD AUTO: 0.04 X10(3) UL (ref 0–0.2)
BASOPHILS NFR BLD AUTO: 0.3 %
DEPRECATED RDW RBC AUTO: 42.3 FL (ref 35.1–46.3)
EOSINOPHIL # BLD AUTO: 0.12 X10(3) UL (ref 0–0.7)
EOSINOPHIL NFR BLD AUTO: 0.8 %
ERYTHROCYTE [DISTWIDTH] IN BLOOD BY AUTOMATED COUNT: 13.6 % (ref 11–15)
HCT VFR BLD AUTO: 31.7 % (ref 35–48)
HGB BLD-MCNC: 10.4 G/DL (ref 12–16)
IMM GRANULOCYTES # BLD AUTO: 0.15 X10(3) UL (ref 0–1)
IMM GRANULOCYTES NFR BLD: 1 %
LYMPHOCYTES # BLD AUTO: 2.34 X10(3) UL (ref 1–4)
LYMPHOCYTES NFR BLD AUTO: 16.3 %
MCH RBC QN AUTO: 28 PG (ref 26–34)
MCHC RBC AUTO-ENTMCNC: 32.8 G/DL (ref 31–37)
MCV RBC AUTO: 85.4 FL (ref 80–100)
MONOCYTES # BLD AUTO: 0.94 X10(3) UL (ref 0.1–1)
MONOCYTES NFR BLD AUTO: 6.5 %
NEUTROPHILS # BLD AUTO: 10.81 X10 (3) UL (ref 1.5–7.7)
NEUTROPHILS # BLD AUTO: 10.81 X10(3) UL (ref 1.5–7.7)
NEUTROPHILS NFR BLD AUTO: 75.1 %
PLATELET # BLD AUTO: 198 10(3)UL (ref 150–450)
RBC # BLD AUTO: 3.71 X10(6)UL (ref 3.8–5.3)
WBC # BLD AUTO: 14.4 X10(3) UL (ref 4–11)

## 2019-02-03 NOTE — PLAN OF CARE
Problem: POSTPARTUM  Goal: Experiences normal breast weaning course  INTERVENTIONS:  - Assess for and manage engorgement. - Instruct on breast care. - Provide comfort measures.   Outcome: Completed Date Met: 02/02/19  This is a breastfeeding mom

## 2019-02-03 NOTE — PLAN OF CARE
Problem: POSTPARTUM  Goal: Optimize infant feeding at the breast  INTERVENTIONS:  - Initiate breast feeding within first hour after birth. - Monitor effectiveness of current breast feeding efforts. - Assess support systems available to mother/family.   - emotional status and coping mechanisms. - Encourage caregiver to participate in  daily care. - Assess support systems available to mother/family.  - Provide /case management support as needed.   Outcome: Progressing

## 2019-02-04 ENCOUNTER — TELEPHONE (OUTPATIENT)
Dept: OBGYN CLINIC | Facility: CLINIC | Age: 29
End: 2019-02-04

## 2019-02-04 VITALS
HEIGHT: 61.5 IN | RESPIRATION RATE: 16 BRPM | BODY MASS INDEX: 27.96 KG/M2 | DIASTOLIC BLOOD PRESSURE: 79 MMHG | SYSTOLIC BLOOD PRESSURE: 125 MMHG | WEIGHT: 150 LBS | TEMPERATURE: 98 F | OXYGEN SATURATION: 99 % | HEART RATE: 108 BPM

## 2019-02-04 PROBLEM — Z34.90 PREGNANCY (HCC): Status: RESOLVED | Noted: 2019-02-01 | Resolved: 2019-02-04

## 2019-02-04 PROBLEM — Z34.90 PREGNANCY: Status: RESOLVED | Noted: 2019-02-01 | Resolved: 2019-02-04

## 2019-02-04 NOTE — DISCHARGE SUMMARY
[facility]  Discharge Summary    [address] [place] [address] Patient Status:  inpatient    1990 MRN [id number]   Location 2217/2217-A Attending [facility] [address] [facility] Day # 3 PCP Chito Baer MD     Date of Admission: 2019    Date of Discharge:

## 2019-02-04 NOTE — TELEPHONE ENCOUNTER
Pt's spouse dropped off paperwork for completion    Only 1 section    Please fax when completed    Call Chalo when completed

## 2019-02-04 NOTE — TELEPHONE ENCOUNTER
Patient's spouse needs paper work filled out today. He also needs proof of birth letter for his insurance company.  Please call patient or spouse when both are completed

## 2019-02-04 NOTE — PROGRESS NOTES
[facility]  Post-Partum Progress Note    Jaun Beasley Patient Status:  Inpatient    1990 MRN [id number]   [facility] 2SW-J Attending Kevin Yanez MD   Hosp Day # 3 PCP Bk Byrne MD     SUBJECTIVE:    Postpartum

## 2019-02-05 NOTE — TELEPHONE ENCOUNTER
Form was signed by Kellen Ruiz and faxed to the number provided on form. Patient's , Mike Gonzalez, will be picking up the confirmation of the fax.

## 2019-02-06 ENCOUNTER — TELEPHONE (OUTPATIENT)
Dept: OBGYN UNIT | Facility: HOSPITAL | Age: 29
End: 2019-02-06

## 2019-02-07 ENCOUNTER — TELEPHONE (OUTPATIENT)
Dept: OBGYN UNIT | Facility: HOSPITAL | Age: 29
End: 2019-02-07

## 2019-02-07 NOTE — PROGRESS NOTES
Outgoing cradle call completed. Mom reports that she and infant are doing well. Has pediatrician F/U visit today. Has PP F/U visit scheduled. No complaints of PPB or PPD. Reviewed basic infant and self care; verbalizes understanding.   Encouraged to fo

## 2019-03-14 ENCOUNTER — POSTPARTUM (OUTPATIENT)
Dept: OBGYN CLINIC | Facility: CLINIC | Age: 29
End: 2019-03-14
Payer: COMMERCIAL

## 2019-03-14 VITALS
BODY MASS INDEX: 26.59 KG/M2 | HEART RATE: 80 BPM | SYSTOLIC BLOOD PRESSURE: 104 MMHG | HEIGHT: 61.5 IN | RESPIRATION RATE: 16 BRPM | WEIGHT: 142.63 LBS | TEMPERATURE: 99 F | DIASTOLIC BLOOD PRESSURE: 74 MMHG

## 2019-03-15 NOTE — PROGRESS NOTES
[address] [facility]  Obstetrics and Gynecology   Postpartum Progress Note    Subjective:     Syd Adam is a 26year old  female who is s/p  on 19. She reports doing well. Baby doing well and breast feeding.  The patient report inconsistent with dates        Plan:     Postpartum exam   - doing well, no complaints   - no abnormal findings on physical exam   - may return to normal activity   Contraception counseling   - discussion held with patient about family planning and contrac

## 2019-04-12 ENCOUNTER — TELEPHONE (OUTPATIENT)
Dept: OBGYN CLINIC | Facility: CLINIC | Age: 29
End: 2019-04-12

## 2019-04-12 NOTE — TELEPHONE ENCOUNTER
Note generated. Patient requested we email to:   [email or web address]  Email sent.

## 2019-09-24 ENCOUNTER — OFFICE VISIT (OUTPATIENT)
Dept: OBGYN CLINIC | Facility: CLINIC | Age: 29
End: 2019-09-24
Payer: COMMERCIAL

## 2019-09-24 VITALS
HEART RATE: 90 BPM | DIASTOLIC BLOOD PRESSURE: 56 MMHG | SYSTOLIC BLOOD PRESSURE: 98 MMHG | WEIGHT: 130.81 LBS | HEIGHT: 60.5 IN | BODY MASS INDEX: 25.02 KG/M2

## 2019-09-24 DIAGNOSIS — N94.0 OVULATION PAIN: ICD-10-CM

## 2019-09-24 DIAGNOSIS — Z01.419 WELL WOMAN EXAM WITH ROUTINE GYNECOLOGICAL EXAM: Primary | ICD-10-CM

## 2019-09-24 DIAGNOSIS — Z12.4 CERVICAL CANCER SCREENING: ICD-10-CM

## 2019-09-24 PROBLEM — Z34.91 ENCOUNTER FOR SUPERVISION OF NORMAL PREGNANCY IN FIRST TRIMESTER: Status: RESOLVED | Noted: 2018-07-23 | Resolved: 2019-09-24

## 2019-09-24 PROBLEM — Z34.91 ENCOUNTER FOR SUPERVISION OF NORMAL PREGNANCY IN FIRST TRIMESTER (HCC): Status: RESOLVED | Noted: 2018-07-23 | Resolved: 2019-09-24

## 2019-09-24 PROCEDURE — 87624 HPV HI-RISK TYP POOLED RSLT: CPT | Performed by: OBSTETRICS & GYNECOLOGY

## 2019-09-24 PROCEDURE — 99395 PREV VISIT EST AGE 18-39: CPT | Performed by: OBSTETRICS & GYNECOLOGY

## 2019-09-24 NOTE — PROGRESS NOTES
OB/GYN H&P     2019  3:24 PM    CC: Patient presents with:  Physical: no c/o's or concerns      HPI: Kathie Schwartz is a 26year old  here for [employer]. Doing very well. 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    No Smokeless tobacco: Never Used    Alcohol use: No      Alcohol/week: 0.0 standard drinks    Drug use: No      ROS:   A comprehensive 10 point review of systems was completed. Pertinent positives and negatives noted in the the HPI.       Objective:    BP 98/

## 2019-09-27 LAB — HPV I/H RISK 1 DNA SPEC QL NAA+PROBE: NEGATIVE

## 2019-10-01 ENCOUNTER — APPOINTMENT (OUTPATIENT)
Dept: OBGYN CLINIC | Facility: CLINIC | Age: 29
End: 2019-10-01
Payer: COMMERCIAL

## 2019-10-01 DIAGNOSIS — N94.0 OVULATION PAIN: ICD-10-CM

## 2019-10-01 PROCEDURE — 76830 TRANSVAGINAL US NON-OB: CPT | Performed by: OBSTETRICS & GYNECOLOGY

## 2019-10-01 PROCEDURE — 76856 US EXAM PELVIC COMPLETE: CPT | Performed by: OBSTETRICS & GYNECOLOGY

## 2019-10-07 ENCOUNTER — MED REC SCAN ONLY (OUTPATIENT)
Dept: OBGYN CLINIC | Facility: CLINIC | Age: 29
End: 2019-10-07

## 2021-01-01 ENCOUNTER — HOSPITAL ENCOUNTER (INPATIENT)
Facility: HOSPITAL | Age: 31
LOS: 4 days | Discharge: HOME | DRG: 813 | End: 2021-01-05
Attending: EMERGENCY MEDICINE | Admitting: SPECIALIST
Payer: COMMERCIAL

## 2021-01-01 ENCOUNTER — LAB ENCOUNTER (OUTPATIENT)
Dept: LAB | Facility: HOSPITAL | Age: 31
End: 2021-01-01
Attending: HOSPITALIST
Payer: COMMERCIAL

## 2021-01-01 DIAGNOSIS — D59.9 ACQUIRED HEMOLYTIC ANEMIA (HCC): ICD-10-CM

## 2021-01-01 DIAGNOSIS — D69.6 THROMBOCYTOPENIA (HCC): Primary | ICD-10-CM
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
## 2021-01-01 LAB
ANION GAP SERPL CALC-SCNC: 1 MMOL/L (ref 0–18)
ANTIBODY SCREEN: NEGATIVE
APTT PPP: 37.1 SECONDS (ref 25.4–36.1)
BASOPHILS # BLD AUTO: 0.01 X10(3) UL (ref 0–0.2)
BASOPHILS # BLD AUTO: 0.03 X10(3) UL (ref 0–0.2)
BASOPHILS # BLD AUTO: 0.03 X10(3) UL (ref 0–0.2)
BASOPHILS NFR BLD AUTO: 0.2 %
BASOPHILS NFR BLD AUTO: 0.5 %
BASOPHILS NFR BLD AUTO: 0.6 %
BILIRUB UR QL STRIP.AUTO: NEGATIVE
BUN BLD-MCNC: 10 MG/DL (ref 7–18)
BUN/CREAT SERPL: 12.8 (ref 10–20)
CALCIUM BLD-MCNC: 8.8 MG/DL (ref 8.5–10.1)
CHLORIDE SERPL-SCNC: 110 MMOL/L (ref 98–112)
CLARITY UR REFRACT.AUTO: CLEAR
CO2 SERPL-SCNC: 28 MMOL/L (ref 21–32)
CREAT BLD-MCNC: 0.78 MG/DL
DEPRECATED RDW RBC AUTO: 37 FL (ref 35.1–46.3)
DEPRECATED RDW RBC AUTO: 37.2 FL (ref 35.1–46.3)
DEPRECATED RDW RBC AUTO: 37.2 FL (ref 35.1–46.3)
EOSINOPHIL # BLD AUTO: 0.04 X10(3) UL (ref 0–0.7)
EOSINOPHIL # BLD AUTO: 0.05 X10(3) UL (ref 0–0.7)
EOSINOPHIL # BLD AUTO: 0.08 X10(3) UL (ref 0–0.7)
EOSINOPHIL NFR BLD AUTO: 0.7 %
EOSINOPHIL NFR BLD AUTO: 1 %
EOSINOPHIL NFR BLD AUTO: 1.4 %
ERYTHROCYTE [DISTWIDTH] IN BLOOD BY AUTOMATED COUNT: 13 % (ref 11–15)
ERYTHROCYTE [DISTWIDTH] IN BLOOD BY AUTOMATED COUNT: 13.1 % (ref 11–15)
ERYTHROCYTE [DISTWIDTH] IN BLOOD BY AUTOMATED COUNT: 13.1 % (ref 11–15)
EXPIRATION DATE: NORMAL
GLUCOSE BLD-MCNC: 82 MG/DL (ref 70–99)
GLUCOSE UR STRIP.AUTO-MCNC: NEGATIVE MG/DL
HCT VFR BLD AUTO: 30.4 %
HCT VFR BLD AUTO: 32.7 %
HCT VFR BLD AUTO: 34.2 %
HGB BLD-MCNC: 10.8 G/DL
HGB BLD-MCNC: 11.5 G/DL
HGB BLD-MCNC: 12.1 G/DL
IMM GRANULOCYTES # BLD AUTO: 0.02 X10(3) UL (ref 0–1)
IMM GRANULOCYTES NFR BLD: 0.3 %
IMM GRANULOCYTES NFR BLD: 0.3 %
IMM GRANULOCYTES NFR BLD: 0.4 %
INR BLD: 1.02 (ref 0.89–1.11)
KETONES UR STRIP.AUTO-MCNC: NEGATIVE MG/DL
LEUKOCYTE ESTERASE UR QL STRIP.AUTO: NEGATIVE
LYMPHOCYTES # BLD AUTO: 1.61 X10(3) UL (ref 1–4)
LYMPHOCYTES # BLD AUTO: 1.66 X10(3) UL (ref 1–4)
LYMPHOCYTES # BLD AUTO: 2.22 X10(3) UL (ref 1–4)
LYMPHOCYTES NFR BLD AUTO: 26.4 %
LYMPHOCYTES NFR BLD AUTO: 34.8 %
LYMPHOCYTES NFR BLD AUTO: 37.8 %
MCH RBC QN AUTO: 28 PG (ref 26–34)
MCH RBC QN AUTO: 28 PG (ref 26–34)
MCH RBC QN AUTO: 28.1 PG (ref 26–34)
MCHC RBC AUTO-ENTMCNC: 35.2 G/DL (ref 31–37)
MCHC RBC AUTO-ENTMCNC: 35.4 G/DL (ref 31–37)
MCHC RBC AUTO-ENTMCNC: 35.5 G/DL (ref 31–37)
MCV RBC AUTO: 79.2 FL
MCV RBC AUTO: 79.2 FL
MCV RBC AUTO: 79.6 FL
MONOCYTES # BLD AUTO: 0.53 X10(3) UL (ref 0.1–1)
MONOCYTES # BLD AUTO: 0.54 X10(3) UL (ref 0.1–1)
MONOCYTES # BLD AUTO: 0.57 X10(3) UL (ref 0.1–1)
MONOCYTES NFR BLD AUTO: 11.3 %
MONOCYTES NFR BLD AUTO: 8.7 %
MONOCYTES NFR BLD AUTO: 9.7 %
NEUTROPHILS # BLD AUTO: 2.47 X10 (3) UL (ref 1.5–7.7)
NEUTROPHILS # BLD AUTO: 2.47 X10(3) UL (ref 1.5–7.7)
NEUTROPHILS # BLD AUTO: 2.97 X10 (3) UL (ref 1.5–7.7)
NEUTROPHILS # BLD AUTO: 2.97 X10(3) UL (ref 1.5–7.7)
NEUTROPHILS # BLD AUTO: 3.86 X10 (3) UL (ref 1.5–7.7)
NEUTROPHILS # BLD AUTO: 3.86 X10(3) UL (ref 1.5–7.7)
NEUTROPHILS NFR BLD AUTO: 50.6 %
NEUTROPHILS NFR BLD AUTO: 51.9 %
NEUTROPHILS NFR BLD AUTO: 63.4 %
NITRITE UR QL STRIP.AUTO: NEGATIVE
OSMOLALITY SERPL CALC.SUM OF ELEC: 286 MOSM/KG (ref 275–295)
PH UR STRIP.AUTO: 6 [PH] (ref 4.5–8)
PLATELET # BLD AUTO: 15 10(3)UL (ref 150–450)
PLATELET # BLD AUTO: 15 10(3)UL (ref 150–450)
PLATELET # BLD AUTO: 18 10(3)UL (ref 150–450)
POCT URINE PREGNANCY: NEGATIVE
POTASSIUM SERPL-SCNC: 3.7 MMOL/L (ref 3.5–5.1)
PROCEDURE CONTROL: NORMAL
PROT UR STRIP.AUTO-MCNC: 30 MG/DL
PSA SERPL DL<=0.01 NG/ML-MCNC: 13.7 SECONDS (ref 12.4–14.6)
RBC # BLD AUTO: 3.84 X10(6)UL
RBC # BLD AUTO: 4.11 X10(6)UL
RBC # BLD AUTO: 4.32 X10(6)UL
RH BLOOD TYPE: POSITIVE
SARS-COV-2 RNA RESP QL NAA+PROBE: NOT DETECTED
SODIUM SERPL-SCNC: 139 MMOL/L (ref 136–145)
SP GR UR STRIP.AUTO: <1.005 (ref 1–1.03)
UROBILINOGEN UR STRIP.AUTO-MCNC: <2 MG/DL
WBC # BLD AUTO: 4.8 X10(3) UL (ref 4–11)
WBC # BLD AUTO: 5.9 X10(3) UL (ref 4–11)
WBC # BLD AUTO: 6.1 X10(3) UL (ref 4–11)

## 2021-01-01 PROCEDURE — 85610 PROTHROMBIN TIME: CPT

## 2021-01-01 PROCEDURE — 36415 COLL VENOUS BLD VENIPUNCTURE: CPT

## 2021-01-01 PROCEDURE — 85025 COMPLETE CBC W/AUTO DIFF WBC: CPT

## 2021-01-01 PROCEDURE — 85730 THROMBOPLASTIN TIME PARTIAL: CPT

## 2021-01-01 RX ORDER — ONDANSETRON 2 MG/ML
4 INJECTION INTRAMUSCULAR; INTRAVENOUS EVERY 4 HOURS PRN
Status: DISCONTINUED | OUTPATIENT
Start: 2021-01-01 | End: 2021-01-02

## 2021-01-01 RX ORDER — ACETAMINOPHEN 325 MG/1
650 TABLET ORAL EVERY 6 HOURS PRN
Status: DISCONTINUED | OUTPATIENT
Start: 2021-01-01 | End: 2021-01-02

## 2021-01-01 NOTE — ED PROVIDER NOTES
Patient Seen in: [facility] Emergency Department      History   Patient presents with:  Abnormal Result    Stated Complaint: low plat count    HPI/Subjective:   HPI    79-year-old otherwise healthy female presents today for thrombocytopenia.   Eddie Mucous membranes are moist.   Eyes:      Extraocular Movements: Extraocular movements intact. Pupils: Pupils are equal, round, and reactive to light. Neck:      Musculoskeletal: Neck supple.    Cardiovascular:      Rate and Rhythm: Normal rate and re tests on the individual orders. SCAN SLIDE   POCT PREGNANCY, URINE   RAINBOW DRAW BLUE   RAINBOW DRAW LAVENDER   RAINBOW DRAW LIGHT GREEN   RAINBOW DRAW GOLD                   MDM      69-year-old presents today with thrombocytopenia.   Patient with no he

## 2021-01-01 NOTE — PROGRESS NOTES
NURSING ADMISSION NOTE      Patient admitted via Cart  Oriented to room. Safety precautions initiated. Bed in low position. Call light in reach. VSS afebrile. Denies pain. States she has her menses at this time.   States she recently noticed Ricardo Davis

## 2021-01-01 NOTE — ED INITIAL ASSESSMENT (HPI)
Noticing bruising she works at [employer] she had a low platelet count and was instructed to come here.  No headaches dizziness only with changing in position no bleeding noted

## 2021-01-02 LAB
ALBUMIN SERPL-MCNC: 3.6 G/DL (ref 3.4–5)
ALP LIVER SERPL-CCNC: 54 U/L
ALT SERPL-CCNC: 31 U/L
AST SERPL-CCNC: 23 U/L (ref 15–37)
B2 MICROGLOB SERPL-MCNC: 0.23 MG/DL (ref 0.11–0.25)
BASOPHILS # BLD AUTO: 0.01 X10(3) UL (ref 0–0.2)
BASOPHILS # BLD AUTO: 0.02 X10(3) UL (ref 0–0.2)
BASOPHILS NFR BLD AUTO: 0.1 %
BASOPHILS NFR BLD AUTO: 0.3 %
BILIRUB DIRECT SERPL-MCNC: 0.3 MG/DL (ref 0–0.2)
BILIRUB SERPL-MCNC: 1.5 MG/DL (ref 0.1–2)
CRP SERPL-MCNC: <0.29 MG/DL (ref ?–0.3)
DEPRECATED HBV CORE AB SER IA-ACNC: 345.6 NG/ML
DEPRECATED RDW RBC AUTO: 37.5 FL (ref 35.1–46.3)
DEPRECATED RDW RBC AUTO: 37.8 FL (ref 35.1–46.3)
DIRECT COOMBS POLY: NEGATIVE
EOSINOPHIL # BLD AUTO: 0 X10(3) UL (ref 0–0.7)
EOSINOPHIL # BLD AUTO: 0.1 X10(3) UL (ref 0–0.7)
EOSINOPHIL NFR BLD AUTO: 0 %
EOSINOPHIL NFR BLD AUTO: 1.7 %
ERYTHROCYTE [DISTWIDTH] IN BLOOD BY AUTOMATED COUNT: 13.1 % (ref 11–15)
ERYTHROCYTE [DISTWIDTH] IN BLOOD BY AUTOMATED COUNT: 13.2 % (ref 11–15)
FIBRINOGEN: 323 MG/DL (ref 200–446)
FOLATE SERPL-MCNC: 19.4 NG/ML (ref 8.7–?)
HAPTOGLOB SERPL-MCNC: <7.8 MG/DL (ref 30–200)
HAV IGM SER QL: 2 MG/DL (ref 1.6–2.6)
HCT VFR BLD AUTO: 28.6 %
HCT VFR BLD AUTO: 29.6 %
HCV AB SERPL QL IA: NONREACTIVE
HGB BLD-MCNC: 10.3 G/DL
HGB BLD-MCNC: 9.8 G/DL
HGB RETIC QN AUTO: 35.6 PG (ref 28.2–36.6)
IMM GRANULOCYTES # BLD AUTO: 0.02 X10(3) UL (ref 0–1)
IMM GRANULOCYTES # BLD AUTO: 0.07 X10(3) UL (ref 0–1)
IMM GRANULOCYTES NFR BLD: 0.3 %
IMM GRANULOCYTES NFR BLD: 0.8 %
IMM RETICS NFR: 0.04 RATIO (ref 0.1–0.3)
IRON SATURATION: 45 %
IRON SERPL-MCNC: 157 UG/DL
LDH SERPL L TO P-CCNC: 428 U/L
LDH SERPL L TO P-CCNC: 443 U/L
LYMPHOCYTES # BLD AUTO: 0.76 X10(3) UL (ref 1–4)
LYMPHOCYTES # BLD AUTO: 2.35 X10(3) UL (ref 1–4)
LYMPHOCYTES NFR BLD AUTO: 41.1 %
LYMPHOCYTES NFR BLD AUTO: 9.2 %
M PROTEIN MFR SERPL ELPH: 7.3 G/DL (ref 6.4–8.2)
MCH RBC QN AUTO: 27.5 PG (ref 26–34)
MCH RBC QN AUTO: 27.9 PG (ref 26–34)
MCHC RBC AUTO-ENTMCNC: 34.3 G/DL (ref 31–37)
MCHC RBC AUTO-ENTMCNC: 34.8 G/DL (ref 31–37)
MCV RBC AUTO: 80.2 FL
MCV RBC AUTO: 80.3 FL
MONOCYTES # BLD AUTO: 0.2 X10(3) UL (ref 0.1–1)
MONOCYTES # BLD AUTO: 0.6 X10(3) UL (ref 0.1–1)
MONOCYTES NFR BLD AUTO: 10.5 %
MONOCYTES NFR BLD AUTO: 2.4 %
NEUTROPHILS # BLD AUTO: 2.63 X10 (3) UL (ref 1.5–7.7)
NEUTROPHILS # BLD AUTO: 2.63 X10(3) UL (ref 1.5–7.7)
NEUTROPHILS # BLD AUTO: 7.26 X10 (3) UL (ref 1.5–7.7)
NEUTROPHILS # BLD AUTO: 7.26 X10(3) UL (ref 1.5–7.7)
NEUTROPHILS NFR BLD AUTO: 46.1 %
NEUTROPHILS NFR BLD AUTO: 87.5 %
PHOSPHATE SERPL-MCNC: 3.6 MG/DL (ref 2.5–4.9)
PLATELET # BLD AUTO: 10 10(3)UL (ref 150–450)
PLATELET # BLD AUTO: 15 10(3)UL (ref 150–450)
RBC # BLD AUTO: 3.56 X10(6)UL
RBC # BLD AUTO: 3.69 X10(6)UL
RETICS # AUTO: 58.2 X10(3) UL (ref 22.5–147.5)
RETICS/RBC NFR AUTO: 1.7 %
SED RATE-ML: 15 MM/HR
TOTAL IRON BINDING CAPACITY: 352 UG/DL (ref 240–450)
TRANSFERRIN SERPL-MCNC: 236 MG/DL (ref 200–360)
URATE SERPL-MCNC: 3.4 MG/DL
WBC # BLD AUTO: 5.7 X10(3) UL (ref 4–11)
WBC # BLD AUTO: 8.3 X10(3) UL (ref 4–11)

## 2021-01-02 PROCEDURE — 99232 SBSQ HOSP IP/OBS MODERATE 35: CPT | Performed by: INTERNAL MEDICINE

## 2021-01-02 PROCEDURE — 99255 IP/OBS CONSLTJ NEW/EST HI 80: CPT | Performed by: INTERNAL MEDICINE

## 2021-01-02 PROCEDURE — 30233S1 TRANSFUSION OF NONAUTOLOGOUS GLOBULIN INTO PERIPHERAL VEIN, PERCUTANEOUS APPROACH: ICD-10-PCS | Performed by: INTERNAL MEDICINE

## 2021-01-02 RX ORDER — PANTOPRAZOLE SODIUM 40 MG/1
40 TABLET, DELAYED RELEASE ORAL
Status: DISCONTINUED | OUTPATIENT
Start: 2021-01-02 | End: 2021-01-05

## 2021-01-02 RX ORDER — DEXAMETHASONE 4 MG/1
40 TABLET ORAL DAILY
Status: DISCONTINUED | OUTPATIENT
Start: 2021-01-02 | End: 2021-01-05

## 2021-01-02 RX ORDER — MELATONIN
3 NIGHTLY PRN
Status: DISCONTINUED | OUTPATIENT
Start: 2021-01-02 | End: 2021-01-05

## 2021-01-02 RX ORDER — POTASSIUM CHLORIDE 20 MEQ/1
40 TABLET, EXTENDED RELEASE ORAL ONCE
Status: COMPLETED | OUTPATIENT
Start: 2021-01-02 | End: 2021-01-02

## 2021-01-02 RX ORDER — FOLIC ACID 1 MG/1
1 TABLET ORAL DAILY
Status: DISCONTINUED | OUTPATIENT
Start: 2021-01-02 | End: 2021-01-05

## 2021-01-02 RX ORDER — ONDANSETRON 2 MG/ML
4 INJECTION INTRAMUSCULAR; INTRAVENOUS EVERY 6 HOURS PRN
Status: DISCONTINUED | OUTPATIENT
Start: 2021-01-02 | End: 2021-01-05

## 2021-01-02 RX ORDER — ACETAMINOPHEN 325 MG/1
650 TABLET ORAL EVERY 6 HOURS PRN
Status: DISCONTINUED | OUTPATIENT
Start: 2021-01-02 | End: 2021-01-05

## 2021-01-02 NOTE — PLAN OF CARE
Pt VSS, afebrile, aox4, denies lightheadedness, denies dizziness, denies pain. Pt in bed with fall precautions r/t low platelet count/low hemoglobin; see results on chart. MD hematology, PCP, and hospitalist are aware.  Hematology to see patient first thing cognitive and physical deficits and behaviors that affect risk of falls.   - [place] fall precautions as indicated by assessment.  - Educate pt/family on patient safety including physical limitations  - Instruct pt to call for assistance with activity bas

## 2021-01-02 NOTE — H&P
[facility]  History and Physical     Veronikariley Porter Patient Status:  Inpatient    1990 MRN [id number]   [facility] 3SW-A Attending Ankit Singletary MD   Hosp Day # 1 PCP Royal Ever MD     Chief Complaint: Char Soliman facility-administered medications on file prior to encounter.      •  Cholecalciferol (VITAMIN D3) 2000 units Oral Chew Tab, Take by mouth., Disp: , Rfl:     •  Cyanocobalamin (B-12) 500 MCG Oral Tab, Take by mouth., Disp: , Rfl:       Review of Systems: TROP, CK in the last 168 hours. Imaging: Imaging data reviewed in Epic. ASSESSMENT / PLAN:     1. Thrombocytopenia, suspicious for ITP  1. Hematology on consult  2. Check for schistocytes on smear  3.  Coags are ok, making TTP and DIC less likely thou

## 2021-01-02 NOTE — CONSULTS
Hematology/Oncology Initial Consultation Note    Patient Name: Sukhwinder Bell  Medical Record Number: [id number]    YOB: 1990   Date of Consultation: 1/2/2021   Physician requesting consultation: Dr. Wyatt Dennis    Reason for Cons current facility-administered medications on file prior to encounter.      •  Cholecalciferol (VITAMIN D3) 2000 units Oral Chew Tab, Take by mouth., Disp: , Rfl:     •  Cyanocobalamin (B-12) 500 MCG Oral Tab, Take by mouth., Disp: , Rfl:       Current Inpat unchanged. No other wet purpura or palatal petechia noted.   CV: Regular rate and rhythm, no murmurs, no LE edema  Respiratory: Lungs clear to auscultation bilaterally  GI/Abd: Soft, non-tender with normoactive bowel sounds, no hepatosplenomegaly  Neurolog Already takes B12 supplement.        Carlitos Jones MD  Hematology/Medical Oncology  [facility]

## 2021-01-02 NOTE — PROGRESS NOTES
[facility]  Progress Note     Sonali Whiteside Patient Status:  Inpatient    1990 MRN [id number]   [facility] 3SW-A Attending Safia Ortiz MD   Hosp Day # 1 PCP Dori Stein MD     Chief Complaint: bruising, ab reviewed in Epic. Medications:   • dexamethasone  40 mg Oral Daily   • Pantoprazole Sodium  40 mg Oral QAM AC   • folic acid  1 mg Oral Daily       ASSESSMENT / PLAN:     1. Thrombocytopenia, suspicious for ITP  1. Hematology on consult  2.  Coags are ok

## 2021-01-03 LAB
ALBUMIN SERPL-MCNC: 2.9 G/DL (ref 3.4–5)
ALBUMIN/GLOB SERPL: 0.6 {RATIO} (ref 1–2)
ALP LIVER SERPL-CCNC: 56 U/L
ALT SERPL-CCNC: 28 U/L
ANION GAP SERPL CALC-SCNC: 4 MMOL/L (ref 0–18)
AST SERPL-CCNC: 45 U/L (ref 15–37)
BASOPHILS # BLD AUTO: 0.03 X10(3) UL (ref 0–0.2)
BASOPHILS NFR BLD AUTO: 0.2 %
BILIRUB SERPL-MCNC: 1.2 MG/DL (ref 0.1–2)
BUN BLD-MCNC: 17 MG/DL (ref 7–18)
BUN/CREAT SERPL: 23.6 (ref 10–20)
CALCIUM BLD-MCNC: 8.6 MG/DL (ref 8.5–10.1)
CHLORIDE SERPL-SCNC: 111 MMOL/L (ref 98–112)
CO2 SERPL-SCNC: 22 MMOL/L (ref 21–32)
CREAT BLD-MCNC: 0.72 MG/DL
DEPRECATED RDW RBC AUTO: 39.3 FL (ref 35.1–46.3)
EOSINOPHIL # BLD AUTO: 0.01 X10(3) UL (ref 0–0.7)
EOSINOPHIL NFR BLD AUTO: 0.1 %
ERYTHROCYTE [DISTWIDTH] IN BLOOD BY AUTOMATED COUNT: 13.6 % (ref 11–15)
GLOBULIN PLAS-MCNC: 4.8 G/DL (ref 2.8–4.4)
GLUCOSE BLD-MCNC: 130 MG/DL (ref 70–99)
H PYLORI AG STL QL IA: NEGATIVE
HCT VFR BLD AUTO: 25.5 %
HGB BLD-MCNC: 8.8 G/DL
IMM GRANULOCYTES # BLD AUTO: 0.17 X10(3) UL (ref 0–1)
IMM GRANULOCYTES NFR BLD: 1 %
LDH SERPL L TO P-CCNC: 603 U/L
LYMPHOCYTES # BLD AUTO: 1.52 X10(3) UL (ref 1–4)
LYMPHOCYTES NFR BLD AUTO: 9.1 %
M PROTEIN MFR SERPL ELPH: 7.7 G/DL (ref 6.4–8.2)
MCH RBC QN AUTO: 27.9 PG (ref 26–34)
MCHC RBC AUTO-ENTMCNC: 34.5 G/DL (ref 31–37)
MCV RBC AUTO: 81 FL
MONOCYTES # BLD AUTO: 1.32 X10(3) UL (ref 0.1–1)
MONOCYTES NFR BLD AUTO: 7.9 %
NEUTROPHILS # BLD AUTO: 13.59 X10 (3) UL (ref 1.5–7.7)
NEUTROPHILS # BLD AUTO: 13.59 X10(3) UL (ref 1.5–7.7)
NEUTROPHILS NFR BLD AUTO: 81.7 %
OSMOLALITY SERPL CALC.SUM OF ELEC: 287 MOSM/KG (ref 275–295)
PLATELET # BLD AUTO: 15 10(3)UL (ref 150–450)
POTASSIUM SERPL-SCNC: 3.7 MMOL/L (ref 3.5–5.1)
POTASSIUM SERPL-SCNC: 3.7 MMOL/L (ref 3.5–5.1)
RBC # BLD AUTO: 3.15 X10(6)UL
SODIUM SERPL-SCNC: 137 MMOL/L (ref 136–145)
VIT B12 SERPL-MCNC: 632 PG/ML (ref 193–986)
WBC # BLD AUTO: 16.6 X10(3) UL (ref 4–11)

## 2021-01-03 PROCEDURE — 99232 SBSQ HOSP IP/OBS MODERATE 35: CPT | Performed by: INTERNAL MEDICINE

## 2021-01-03 RX ORDER — POTASSIUM CHLORIDE 20 MEQ/1
40 TABLET, EXTENDED RELEASE ORAL ONCE
Status: COMPLETED | OUTPATIENT
Start: 2021-01-03 | End: 2021-01-03

## 2021-01-03 NOTE — PROGRESS NOTES
[facility]  Progress Note     Anum Gibson Patient Status:  Inpatient    1990 MRN [id number]   [facility] 3SW-A Attending Heidi Palumbo MD   Hosp Day # 2 PCP Fely Hughes MD     Chief Complaint: bruising, ab Estimated Creatinine Clearance: 82.7 mL/min (based on SCr of 0.78 mg/dL). Recent Labs   Lab 01/01/21  1107   PTP 13.7   INR 1.02       No results for input(s): TROP, CK in the last 168 hours. Imaging: Imaging data reviewed in Epic.     Medi

## 2021-01-03 NOTE — PLAN OF CARE
Problem: PAIN - ADULT  Goal: Verbalizes/displays adequate comfort level or patient's stated pain goal  Description: INTERVENTIONS:  - Encourage pt to monitor pain and request assistance  - Assess pain using appropriate pain scale  - Administer analgesics reduce risk of injury  - Provide assistive devices as appropriate  - Consider OT/PT consult to assist with strengthening/mobility  - Encourage toileting schedule  Outcome: Progressing

## 2021-01-03 NOTE — PROGRESS NOTES
Pt resting in bed, easy non labored breathing on ra. Iv hl. Pt tolerating regular diet. Pt refusing scd's pt does bilateral ankle pumps frequently. Up ad leatha, steady gait. Pt on menses. Pad and panty provided. Plan of care discussed.  All questions answered

## 2021-01-03 NOTE — PROGRESS NOTES
Received call from lab for critical Platelet level. Dr. Virgen Pack contacted and informed of CBC/Platelet level results. No orders given.

## 2021-01-03 NOTE — PROGRESS NOTES
Hematology/Oncology Progress Note    Patient Name: Syd Adam  Medical Record Number: [id number]    YOB: 1990     Reason for Consultation:  Syd Adam was seen today for the diagnosis of thrombocytopenia, anemia    H wet purpura or palatal petechia noted. CV: Regular rate and rhythm, no murmurs, no LE edema  Respiratory: Lungs clear to auscultation bilaterally  GI/Abd: Soft, non-tender with normoactive bowel sounds, no hepatosplenomegaly  Skin: no rashes or petechiae. results found for: TSH  No results found for: T4F  No results found for: T3TOT    Lab Results   Component Value Date    B2MICR 0.227 01/02/2021     Component      Latest Ref Rng & Units 1/2/2021   HCV AB      Nonreactive  Nonreactive   ROSALES Poly       Negat Jemma Kaur MD  Hematology/Medical Oncology  [facility]

## 2021-01-04 ENCOUNTER — APPOINTMENT (OUTPATIENT)
Dept: ULTRASOUND IMAGING | Facility: HOSPITAL | Age: 31
DRG: 813 | End: 2021-01-04
Attending: INTERNAL MEDICINE
Payer: COMMERCIAL

## 2021-01-04 ENCOUNTER — TELEPHONE (OUTPATIENT)
Dept: HEMATOLOGY/ONCOLOGY | Facility: HOSPITAL | Age: 31
End: 2021-01-04

## 2021-01-04 LAB
ALBUMIN SERPL ELPH-MCNC: 3.9 G/DL (ref 3.75–5.21)
ALBUMIN SERPL-MCNC: 2.7 G/DL (ref 3.4–5)
ALBUMIN/GLOB SERPL: 0.4 {RATIO} (ref 1–2)
ALBUMIN/GLOB SERPL: 1.35 {RATIO} (ref 1–2)
ALP LIVER SERPL-CCNC: 53 U/L
ALPHA1 GLOB SERPL ELPH-MCNC: 0.31 G/DL (ref 0.19–0.46)
ALPHA2 GLOB SERPL ELPH-MCNC: 0.45 G/DL (ref 0.48–1.05)
ALT SERPL-CCNC: 28 U/L
ANION GAP SERPL CALC-SCNC: 2 MMOL/L (ref 0–18)
AST SERPL-CCNC: 34 U/L (ref 15–37)
B-GLOBULIN SERPL ELPH-MCNC: 0.82 G/DL (ref 0.68–1.23)
BASOPHILS # BLD AUTO: 0.04 X10(3) UL (ref 0–0.2)
BASOPHILS NFR BLD AUTO: 0.2 %
BILIRUB SERPL-MCNC: 0.7 MG/DL (ref 0.1–2)
BUN BLD-MCNC: 17 MG/DL (ref 7–18)
BUN/CREAT SERPL: 25.8 (ref 10–20)
CALCIUM BLD-MCNC: 8.8 MG/DL (ref 8.5–10.1)
CHLORIDE SERPL-SCNC: 110 MMOL/L (ref 98–112)
CO2 SERPL-SCNC: 24 MMOL/L (ref 21–32)
COPPER, SERUM: 99.8 UG/DL
CREAT BLD-MCNC: 0.66 MG/DL
DEPRECATED RDW RBC AUTO: 42.5 FL (ref 35.1–46.3)
EOSINOPHIL # BLD AUTO: 0.07 X10(3) UL (ref 0–0.7)
EOSINOPHIL NFR BLD AUTO: 0.4 %
ERYTHROCYTE [DISTWIDTH] IN BLOOD BY AUTOMATED COUNT: 14.7 % (ref 11–15)
GAMMA GLOB SERPL ELPH-MCNC: 1.32 G/DL (ref 0.62–1.7)
GLOBULIN PLAS-MCNC: 6.2 G/DL (ref 2.8–4.4)
GLUCOSE BLD-MCNC: 122 MG/DL (ref 70–99)
HCT VFR BLD AUTO: 22.6 %
HGB BLD-MCNC: 8 G/DL
HGB RETIC QN AUTO: 36.3 PG (ref 28.2–36.6)
IMM GRANULOCYTES # BLD AUTO: 0.49 X10(3) UL (ref 0–1)
IMM GRANULOCYTES NFR BLD: 2.6 %
IMM RETICS NFR: 0.11 RATIO (ref 0.1–0.3)
KAPPA LC FREE SER-MCNC: 3.11 MG/DL (ref 0.33–1.94)
KAPPA LC FREE/LAMBDA FREE SER NEPH: 1.72 {RATIO} (ref 0.26–1.65)
LAMBDA LC FREE SERPL-MCNC: 1.81 MG/DL (ref 0.57–2.63)
LDH SERPL L TO P-CCNC: 537 U/L
LYMPHOCYTES # BLD AUTO: 1.28 X10(3) UL (ref 1–4)
LYMPHOCYTES NFR BLD AUTO: 6.8 %
M PROTEIN MFR SERPL ELPH: 6.8 G/DL (ref 6.4–8.2)
M PROTEIN MFR SERPL ELPH: 8.9 G/DL (ref 6.4–8.2)
MCH RBC QN AUTO: 29 PG (ref 26–34)
MCHC RBC AUTO-ENTMCNC: 35.4 G/DL (ref 31–37)
MCV RBC AUTO: 81.9 FL
MONOCYTES # BLD AUTO: 1.3 X10(3) UL (ref 0.1–1)
MONOCYTES NFR BLD AUTO: 6.9 %
NEUTROPHILS # BLD AUTO: 15.59 X10 (3) UL (ref 1.5–7.7)
NEUTROPHILS # BLD AUTO: 15.59 X10(3) UL (ref 1.5–7.7)
NEUTROPHILS NFR BLD AUTO: 83.1 %
OSMOLALITY SERPL CALC.SUM OF ELEC: 285 MOSM/KG (ref 275–295)
PLATELET # BLD AUTO: 23 10(3)UL (ref 150–450)
POTASSIUM SERPL-SCNC: 4.3 MMOL/L (ref 3.5–5.1)
RBC # BLD AUTO: 2.76 X10(6)UL
RETICS # AUTO: 101.8 X10(3) UL (ref 22.5–147.5)
RETICS/RBC NFR AUTO: 3.7 %
SODIUM SERPL-SCNC: 136 MMOL/L (ref 136–145)
WBC # BLD AUTO: 18.8 X10(3) UL (ref 4–11)

## 2021-01-04 PROCEDURE — 38222 DX BONE MARROW BX & ASPIR: CPT | Performed by: INTERNAL MEDICINE

## 2021-01-04 PROCEDURE — 88291 CYTO/MOLECULAR REPORT: CPT | Performed by: INTERNAL MEDICINE

## 2021-01-04 PROCEDURE — 07DR3ZX EXTRACTION OF ILIAC BONE MARROW, PERCUTANEOUS APPROACH, DIAGNOSTIC: ICD-10-PCS | Performed by: INTERNAL MEDICINE

## 2021-01-04 PROCEDURE — 88264 CHROMOSOME ANALYSIS 20-25: CPT | Performed by: INTERNAL MEDICINE

## 2021-01-04 PROCEDURE — 99232 SBSQ HOSP IP/OBS MODERATE 35: CPT | Performed by: INTERNAL MEDICINE

## 2021-01-04 PROCEDURE — 88237 TISSUE CULTURE BONE MARROW: CPT | Performed by: INTERNAL MEDICINE

## 2021-01-04 PROCEDURE — 76700 US EXAM ABDOM COMPLETE: CPT | Performed by: INTERNAL MEDICINE

## 2021-01-04 RX ORDER — LORAZEPAM 2 MG/ML
INJECTION INTRAMUSCULAR ONCE
Status: COMPLETED | OUTPATIENT
Start: 2021-01-04 | End: 2021-01-04

## 2021-01-04 NOTE — PLAN OF CARE
Problem: Patient/Family Goals  Goal: Patient/Family Long Term Goal  Description: Patient's Long Term Goal: Discharge Home     Interventions:  - Pain Management   - See additional Care Plan goals for specific interventions  Outcome: Progressing  Goal: Sosa Barrera assessment  - Modify environment to reduce risk of injury  - Provide assistive devices as appropriate  - Consider OT/PT consult to assist with strengthening/mobility  - Encourage toileting schedule  Outcome: Progressing     Problem: [address] Sadia Godfrey

## 2021-01-04 NOTE — PROCEDURES
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       Lab Results   Component Value Date    WBC 18.8 (H) 01/04/2021    RBC 2.76 (L) 01/04/2021    HB Auto Resulted 01/01/2021    HCT 22.6 (L) 01/04/2021    MCV 81.9 01/04/2021    MCHC 35.4 01/04/2021    RDW 14.7 01/04/2021    PLT

## 2021-01-04 NOTE — TELEPHONE ENCOUNTER
Patient wants to do bedside biopsy rather than under anesthesia. Please contact Damian Robles as soon as possible. Thank you.  Noemy Verified name and date of birth . New injections instructed to wait 15 min post injection in the lobby and to report any symptoms of a reaction to nursing .  Elle Rodrigez RN on 3/26/2019 at 9:41 AM

## 2021-01-04 NOTE — PROGRESS NOTES
[facility]  Progress Note     Ruslan Cortez Patient Status:  Inpatient    1990 MRN [id number]   [facility] 3SW-A Attending Rebecca Nichlos MD   Hosp Day # 3 PCP Royal Ever MD     Chief Complaint: bruising, ab 24.0   ALKPHO 54 56 53   AST 23 45* 34   ALT 31 28 28   BILT 1.5 1.2 0.7   TP 7.3 7.7 8.9*       Estimated Creatinine Clearance: 97.7 mL/min (based on SCr of 0.66 mg/dL).     Recent Labs   Lab 01/01/21  1107   PTP 13.7   INR 1.02       No results for input(

## 2021-01-04 NOTE — PROGRESS NOTES
Pt resting in bed, easy non labored breathing on ra. Vs wnl. Up ad leatha. Steady gait. Iv hl. Pt tolerating regular diet. Denies any nausea or pain at this time. Voids adequate amount. Plan of care discussed. All questions answered.  Instructed to call if any

## 2021-01-04 NOTE — PROGRESS NOTES
** I AGREE WITH CHARTING OF Tyron Cotton. 1045: PAGED DR. MURILLO TO INFORM HER OF PATIENT NOT WANTING TO WAIT UNTIL TOMORROW FOR BONE MARROW BIOPSY UNDER ANESTHESIA AND WOULD PREFER IT BE DONE TODAY AT BEDSIDE.  AWAITING RESPONSE.     1115: PAGED WESTON

## 2021-01-04 NOTE — PROGRESS NOTES
Hematology/Oncology Progress Note    Patient Name: Eddi Moreno  Medical Record Number: [id number]    YOB: 1990     Reason for Consultation:  Eddi Moreno was seen today for the diagnosis of thrombocytopenia, anemia    H edema  Respiratory: Lungs clear to auscultation bilaterally  GI/Abd: Soft, non-tender with normoactive bowel sounds, no hepatosplenomegaly  Skin: no rashes or petechiae.   Few scattered small bruises noted on extremities and under left jawline which are unc ELECTMS1  No results found for: Jackolyn Spine   No results found for: LAMBDALTCH  No results found for: Arlean   No results found for: TSH  No results found for: T4F  No results found for: T3TOT    Lab Results   Component Value Date    B2MICR 0.227 01/02/ worsen tomorrow may consider starting empiric plasma exchange    *normocytic anemia, +acquired hemolysis but hypoproliferative  -increased menstrual blood loss lately but also appears to have evidence of hemolysis without significant iron deficiency identi

## 2021-01-04 NOTE — PAYOR COMM NOTE
Appropriate for inpatient status per guidelines for bruising due to thrombocytopenia due to suspected ITP.    --------------  ADMISSION REVIEW     Payor: Pelon Whitaker #:  [id number]  Authorization Number: [id number]       ED Provider Notes      [address] Physical Exam  Vitals signs and nursing note reviewed. Constitutional:       Appearance: Normal appearance. HENT:      Head: Normocephalic and atraumatic.       Nose: Nose normal.      Mouth/Throat:      Mouth: Mucous membranes are moist.   Eyes ---------                               -----------         ------                     CBC W/ DIFFERENTIAL[[id number]]          Abnormal            Final result                 Please view results for these tests on the individual orders.    SC her small child. She states she normally does not get bruising. She then developed bruising on the left side of her face near her chin without any trauma. 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   She spoke to a colleague at work and h Appropriate mood and affect. Diagnostic Data:      Labs:  Recent Labs   Lab 01/01/21  1107 01/01/21  1441 01/01/21  2059 01/02/21  0024   WBC 4.8 6.1 5.9 5.7   HGB 12.1 11.5* 10.8* 9.8*   MCV 79.2* 79.6* 79.2* 80.3   PLT 18.0* 15.0* 15.0* 10.0*   INR 1. and DIC less likely  3. Continue decadron and IVIG today  4. Follow CBC  2. Anemia in the setting of menses  1. Monitor  3. Migraines not on any medications  1.  Tylenol as needed     Plan of care: monitor blood counts, await Heme recs, consider bone marrow GFRNAA 102  --    CA 8.8  --    ALB 3.6  --      --    K 3.7 3.7     --    CO2 28.0  --    ALKPHO 54  --    AST 23  --    ALT 31  --    BILT 1.5  --    TP 7.3  --      1. Thrombocytopenia, suspicious for ITP  1. Hematology on consult  2.  Cont

## 2021-01-05 ENCOUNTER — TELEPHONE (OUTPATIENT)
Dept: HEMATOLOGY/ONCOLOGY | Facility: HOSPITAL | Age: 31
End: 2021-01-05

## 2021-01-05 VITALS
DIASTOLIC BLOOD PRESSURE: 62 MMHG | TEMPERATURE: 98 F | HEIGHT: 62 IN | WEIGHT: 117 LBS | SYSTOLIC BLOOD PRESSURE: 109 MMHG | OXYGEN SATURATION: 100 % | RESPIRATION RATE: 18 BRPM | BODY MASS INDEX: 21.53 KG/M2 | HEART RATE: 73 BPM

## 2021-01-05 LAB
ALBUMIN SERPL-MCNC: 2.9 G/DL (ref 3.4–5)
ALBUMIN/GLOB SERPL: 0.5 {RATIO} (ref 1–2)
ALP LIVER SERPL-CCNC: 47 U/L
ALT SERPL-CCNC: 45 U/L
ANION GAP SERPL CALC-SCNC: 7 MMOL/L (ref 0–18)
AST SERPL-CCNC: 35 U/L (ref 15–37)
BASOPHILS # BLD: 0 X10(3) UL (ref 0–0.2)
BASOPHILS NFR BLD: 0 %
BILIRUB SERPL-MCNC: 0.6 MG/DL (ref 0.1–2)
BUN BLD-MCNC: 20 MG/DL (ref 7–18)
BUN/CREAT SERPL: 25.3 (ref 10–20)
CALCIUM BLD-MCNC: 9 MG/DL (ref 8.5–10.1)
CHLORIDE SERPL-SCNC: 104 MMOL/L (ref 98–112)
CO2 SERPL-SCNC: 24 MMOL/L (ref 21–32)
CREAT BLD-MCNC: 0.79 MG/DL
DEPRECATED RDW RBC AUTO: 43.3 FL (ref 35.1–46.3)
EOSINOPHIL # BLD: 0 X10(3) UL (ref 0–0.7)
EOSINOPHIL NFR BLD: 0 %
ERYTHROCYTE [DISTWIDTH] IN BLOOD BY AUTOMATED COUNT: 14.9 % (ref 11–15)
GLOBULIN PLAS-MCNC: 6 G/DL (ref 2.8–4.4)
GLUCOSE BLD-MCNC: 114 MG/DL (ref 70–99)
HCT VFR BLD AUTO: 23.3 %
HGB BLD-MCNC: 8.2 G/DL
LDH SERPL L TO P-CCNC: 473 U/L
LYMPHOCYTES NFR BLD: 1.71 X10(3) UL (ref 1–4)
LYMPHOCYTES NFR BLD: 9 %
M PROTEIN MFR SERPL ELPH: 8.9 G/DL (ref 6.4–8.2)
MCH RBC QN AUTO: 28.7 PG (ref 26–34)
MCHC RBC AUTO-ENTMCNC: 35.2 G/DL (ref 31–37)
MCV RBC AUTO: 81.5 FL
METAMYELOCYTES # BLD: 0.19 X10(3) UL
METAMYELOCYTES NFR BLD: 1 %
MONOCYTES # BLD: 0.76 X10(3) UL (ref 0.1–1)
MONOCYTES NFR BLD: 4 %
MORPHOLOGY: NORMAL
NEUTROPHILS # BLD AUTO: 14.51 X10 (3) UL (ref 1.5–7.7)
NEUTROPHILS NFR BLD: 85 %
NEUTS BAND NFR BLD: 1 %
NEUTS HYPERSEG # BLD: 16.34 X10(3) UL (ref 1.5–7.7)
OSMOLALITY SERPL CALC.SUM OF ELEC: 283 MOSM/KG (ref 275–295)
PLATELET # BLD AUTO: 35 10(3)UL (ref 150–450)
POTASSIUM SERPL-SCNC: 4.1 MMOL/L (ref 3.5–5.1)
RBC # BLD AUTO: 2.86 X10(6)UL
SODIUM SERPL-SCNC: 135 MMOL/L (ref 136–145)
TOTAL CELLS COUNTED: 100
WBC # BLD AUTO: 19 X10(3) UL (ref 4–11)

## 2021-01-05 PROCEDURE — 99239 HOSP IP/OBS DSCHRG MGMT >30: CPT | Performed by: INTERNAL MEDICINE

## 2021-01-05 PROCEDURE — 99232 SBSQ HOSP IP/OBS MODERATE 35: CPT | Performed by: INTERNAL MEDICINE

## 2021-01-05 RX ORDER — CHOLECALCIFEROL (VITAMIN D3) 25 MCG
1 TABLET,CHEWABLE ORAL DAILY
Qty: 30 CAPSULE | Refills: 0 | Status: SHIPPED | OUTPATIENT
Start: 2021-01-05 | End: 2021-02-04

## 2021-01-05 RX ORDER — CHOLECALCIFEROL (VITAMIN D3) 25 MCG
1 TABLET,CHEWABLE ORAL DAILY
Qty: 30 CAPSULE | Refills: 0 | Status: SHIPPED | COMMUNITY
Start: 2021-01-05 | End: 2021-01-05

## 2021-01-05 NOTE — PAYOR COMM NOTE
--------------  CONTINUED STAY REVIEW    Daysi Matias POS  Subscriber #:  [id number]  Authorization Number: [id number]        1/4 HEME ONC    Impression & Plan:      *acute severe thrombocytopenia  -Initially appeared to be consistent with ITP at smear was CREATSERUM 0.78 0.72 0.66   GFRAA 117 129 136   GFRNAA 102 112 118   CA 8.8 8.6 8.8   ALB 3.6 2.9* 2.7*    137 136   K 3.7 3.7  3.7 4.3    111 110   CO2 28.0 22.0 24.0   ALKPHO 54 56 53   AST 23 45* 34   ALT 31 28 28   BILT 1.5 1.2 0.7   TP 7 betadine and draped in a sterile manner. Lidocaine 2% was used for local anesthesia. After good anesthesia was obtained, a scalpel was used to enlarge the insertion site. A Vitrinepix bone marrow needle was used to obtain a marrow aspirate.   Multiple slide

## 2021-01-05 NOTE — PROGRESS NOTES
NURSING DISCHARGE NOTE    Discharged Home via Wheelchair. Accompanied by Spouse  Belongings Taken by patient/family. Electronic prescription filled out by Dr. Melanie Ordoñez. Patient to follow up with Dr. Melanie Ordoñez in one week.  Discharge instructions given

## 2021-01-05 NOTE — PAYOR COMM NOTE
--------------  DISCHARGE REVIEW    Payor: Yary Lita #:  [id number]  Authorization Number: [id number]    Admit date: 1/1/21  Admit time:  7843  Discharge Date: 1/5/2021  2:07 PM     Admitting Physician: Bertin Isaac MD  Attending Physician petechiae. No hematuria, melena or hematochezia. She states she is currently on her period and it is heavier than usual.  She typically uses 2 pads a day but has been using 4-5 this time. In the ED, she was found to have a blood count of 15.     Brief Sy [] 86  Resp:  [16-18] 16  BP: (102-108)/(53-67) 105/60    Physical Exam:    General: No acute distress. Respiratory: Clear to auscultation bilaterally. No wheezes. No rhonchi. Cardiovascular: S1, S2. Regular rate and rhythm.  No murmurs, rubs or ga

## 2021-01-05 NOTE — DISCHARGE SUMMARY
[facility]ITAL[facility]  DISCHARGE SUMMARY     [address] [place] [address] Patient Status:  Inpatient    1990 MRN [id number]   [facility] 3SW-A Attending Talha Frank MD   Hosp Day # 4 PCP João Cho MD     Date of Admission: 20 readmission after discharge from the hospital; Still recommend for TCM follow-up.     Procedures during hospitalization:   • Bone marrow biopsy    Incidental or significant findings and recommendations (brief descriptions):  • none    Lab/Test results pendi

## 2021-01-05 NOTE — PLAN OF CARE
Problem: Patient/Family Goals  Goal: Patient/Family Long Term Goal  Description: Patient's Long Term Goal: Discharge Home     Interventions:  - Pain Management   - See additional Care Plan goals for specific interventions  Outcome: Progressing  Goal: Ulisses Marroquin

## 2021-01-05 NOTE — PLAN OF CARE
Pt is alert and oriented x4. Lungs are clear bilaterally on room air. Bowel sounds are active. Pt denies nausea. Reporting pain to back 2/10. Declining pain medication. Foam dressing to biopsy site. CDI. IV SL. Up ad leatha. Plan of care reviewed.  All questio physical needs  - Identify cognitive and physical deficits and behaviors that affect risk of falls.   - [place] fall precautions as indicated by assessment.  - Educate pt/family on patient safety including physical limitations  - Instruct pt to call for a

## 2021-01-05 NOTE — PROGRESS NOTES
Hematology/Oncology Progress Note    Patient Name: Rita Storey  Medical Record Number: [id number]    YOB: 1990     Reason for Consultation:  Rita Storey was seen today for the diagnosis of thrombocytopenia, anemia    H palatal petechia noted. CV: Regular rate and rhythm, no murmurs, no LE edema  Respiratory: Lungs clear to auscultation bilaterally  GI/Abd: Soft, non-tender with normoactive bowel sounds, no hepatosplenomegaly  Skin: no rashes or petechiae.   Few scattered Component Value Date    ESRML 15 01/02/2021     Lab Results   Component Value Date    CRP <0.29 01/02/2021     Lab Results   Component Value Date     (H) 01/05/2021     (H) 01/04/2021     (H) 01/03/2021     (H) 01/02/2021    L anemia), TTP/HUS, PNH, primary marrow disorder/malignancy, atypical infection, cold agglutinin disease.   -[id number] activity, PNH screen, urine hemosiderin, cold agglutinin titer, bmbx pending.   -continue dexamethasone pulse of 40mg daily x 4 days (day 4 t

## 2021-01-05 NOTE — PROGRESS NOTES
Patient is alert and oriented x4. On room air and continuous pulse oximetry. Patient ambulates and voids on her own. Had a bedside bone marrow biopsy today which patient tolerated well. On a regular diet. Patient denies pain. VSS.  Plan of care updated with

## 2021-01-05 NOTE — PLAN OF CARE
Problem: Patient/Family Goals  Goal: Patient/Family Long Term Goal  Description: Patient's Long Term Goal: Discharge Home     Interventions:  - Pain Management   - See additional Care Plan goals for specific interventions  1/5/2021 1238 by Nabila Beltran,

## 2021-01-06 ENCOUNTER — TELEPHONE (OUTPATIENT)
Dept: HEMATOLOGY/ONCOLOGY | Facility: HOSPITAL | Age: 31
End: 2021-01-06

## 2021-01-06 ENCOUNTER — APPOINTMENT (OUTPATIENT)
Dept: HEMATOLOGY/ONCOLOGY | Facility: HOSPITAL | Age: 31
End: 2021-01-06
Attending: INTERNAL MEDICINE
Payer: COMMERCIAL

## 2021-01-06 LAB
% PNH MONOCYTES: 0 %
% PNH PMN: 0 %
% PNH RBC: 0 %
ADAMTS13 ACTIVITY: <5 %

## 2021-01-06 NOTE — TELEPHONE ENCOUNTER
Patient's  called and was looking to hear back about his wife's test results.  The patient saw Dr. Titus Rocha while in the hospital.

## 2021-01-07 ENCOUNTER — OFFICE VISIT (OUTPATIENT)
Dept: HEMATOLOGY/ONCOLOGY | Facility: HOSPITAL | Age: 31
End: 2021-01-07
Attending: INTERNAL MEDICINE
Payer: COMMERCIAL

## 2021-01-07 ENCOUNTER — HOSPITAL ENCOUNTER (EMERGENCY)
Dept: GENERAL RADIOLOGY | Facility: HOSPITAL | Age: 31
Discharge: HOME | DRG: 547 | End: 2021-01-07
Attending: RADIOLOGY
Payer: COMMERCIAL

## 2021-01-07 ENCOUNTER — HOSPITAL ENCOUNTER (INPATIENT)
Facility: HOSPITAL | Age: 31
LOS: 4 days | Discharge: HOME | DRG: 547 | End: 2021-01-11
Attending: EMERGENCY MEDICINE | Admitting: HOSPITALIST
Payer: COMMERCIAL

## 2021-01-07 ENCOUNTER — APPOINTMENT (OUTPATIENT)
Dept: INTERVENTIONAL RADIOLOGY/VASCULAR | Facility: HOSPITAL | Age: 31
DRG: 547 | End: 2021-01-07
Attending: INTERNAL MEDICINE
Payer: COMMERCIAL

## 2021-01-07 VITALS
TEMPERATURE: 98 F | OXYGEN SATURATION: 100 % | HEART RATE: 113 BPM | BODY MASS INDEX: 22 KG/M2 | SYSTOLIC BLOOD PRESSURE: 108 MMHG | DIASTOLIC BLOOD PRESSURE: 72 MMHG | RESPIRATION RATE: 18 BRPM | WEIGHT: 118 LBS

## 2021-01-07 DIAGNOSIS — M31.19 TTP (THROMBOTIC THROMBOCYTOPENIC PURPURA) (HCC): Primary | ICD-10-CM

## 2021-01-07 DIAGNOSIS — D69.6 THROMBOCYTOPENIA (HCC): ICD-10-CM

## 2021-01-07 DIAGNOSIS — M31.19 T.T.P. SYNDROME (HCC): ICD-10-CM

## 2021-01-07 DIAGNOSIS — D59.9 ACQUIRED HEMOLYTIC ANEMIA (HCC): Primary | ICD-10-CM

## 2021-01-07 DIAGNOSIS — D59.9 ACQUIRED HEMOLYTIC ANEMIA (HCC): ICD-10-CM

## 2021-01-07 DIAGNOSIS — M31.19 TTP (THROMBOTIC THROMBOCYTOPENIC PURPURA) (HCC): ICD-10-CM

## 2021-01-07 LAB
ALBUMIN SERPL-MCNC: 3 G/DL (ref 3.4–5)
ALBUMIN SERPL-MCNC: 3.1 G/DL (ref 3.4–5)
ALBUMIN/GLOB SERPL: 0.5 {RATIO} (ref 1–2)
ALBUMIN/GLOB SERPL: 0.5 {RATIO} (ref 1–2)
ALP LIVER SERPL-CCNC: 64 U/L
ALP LIVER SERPL-CCNC: 66 U/L
ALT SERPL-CCNC: 163 U/L
ALT SERPL-CCNC: 167 U/L
ANION GAP SERPL CALC-SCNC: 5 MMOL/L (ref 0–18)
ANION GAP SERPL CALC-SCNC: 5 MMOL/L (ref 0–18)
ANTIBODY SCREEN: NEGATIVE
AST SERPL-CCNC: 67 U/L (ref 15–37)
AST SERPL-CCNC: 68 U/L (ref 15–37)
BASOPHILS # BLD: 0 X10(3) UL (ref 0–0.2)
BASOPHILS # BLD: 0 X10(3) UL (ref 0–0.2)
BASOPHILS NFR BLD: 0 %
BASOPHILS NFR BLD: 0 %
BILIRUB SERPL-MCNC: 0.2 MG/DL (ref 0.1–2)
BILIRUB SERPL-MCNC: 0.3 MG/DL (ref 0.1–2)
BUN BLD-MCNC: 15 MG/DL (ref 7–18)
BUN BLD-MCNC: 17 MG/DL (ref 7–18)
BUN/CREAT SERPL: 18.1 (ref 10–20)
BUN/CREAT SERPL: 20.7 (ref 10–20)
CALCIUM BLD-MCNC: 8.4 MG/DL (ref 8.5–10.1)
CALCIUM BLD-MCNC: 8.6 MG/DL (ref 8.5–10.1)
CHLORIDE SERPL-SCNC: 103 MMOL/L (ref 98–112)
CHLORIDE SERPL-SCNC: 104 MMOL/L (ref 98–112)
CO2 SERPL-SCNC: 28 MMOL/L (ref 21–32)
CO2 SERPL-SCNC: 28 MMOL/L (ref 21–32)
CREAT BLD-MCNC: 0.82 MG/DL
CREAT BLD-MCNC: 0.83 MG/DL
DEPRECATED RDW RBC AUTO: 43.1 FL (ref 35.1–46.3)
DEPRECATED RDW RBC AUTO: 45.7 FL (ref 35.1–46.3)
EOSINOPHIL # BLD: 0.18 X10(3) UL (ref 0–0.7)
EOSINOPHIL # BLD: 0.19 X10(3) UL (ref 0–0.7)
EOSINOPHIL NFR BLD: 1 %
EOSINOPHIL NFR BLD: 1 %
ERYTHROCYTE [DISTWIDTH] IN BLOOD BY AUTOMATED COUNT: 15.2 % (ref 11–15)
ERYTHROCYTE [DISTWIDTH] IN BLOOD BY AUTOMATED COUNT: 15.4 % (ref 11–15)
GLOBULIN PLAS-MCNC: 5.9 G/DL (ref 2.8–4.4)
GLOBULIN PLAS-MCNC: 6 G/DL (ref 2.8–4.4)
GLUCOSE BLD-MCNC: 102 MG/DL (ref 70–99)
GLUCOSE BLD-MCNC: 109 MG/DL (ref 70–99)
HAPTOGLOB SERPL-MCNC: <7.8 MG/DL (ref 30–200)
HAV IGM SER QL: NONREACTIVE
HBV CORE IGM SER QL: NONREACTIVE
HBV SURFACE AG SERPL QL IA: NONREACTIVE
HCT VFR BLD AUTO: 28.4 %
HCT VFR BLD AUTO: 29.7 %
HCV AB SERPL QL IA: NONREACTIVE
HGB BLD-MCNC: 10 G/DL
HGB BLD-MCNC: 9.9 G/DL
HGB RETIC QN AUTO: 37.2 PG (ref 28.2–36.6)
IMM RETICS NFR: 0.18 RATIO (ref 0.1–0.3)
LDH SERPL L TO P-CCNC: 390 U/L
LDH SERPL L TO P-CCNC: 407 U/L
LYMPHOCYTES NFR BLD: 47 %
LYMPHOCYTES NFR BLD: 48 %
LYMPHOCYTES NFR BLD: 8.51 X10(3) UL (ref 1–4)
LYMPHOCYTES NFR BLD: 8.88 X10(3) UL (ref 1–4)
M PROTEIN MFR SERPL ELPH: 8.9 G/DL (ref 6.4–8.2)
M PROTEIN MFR SERPL ELPH: 9.1 G/DL (ref 6.4–8.2)
MCH RBC QN AUTO: 28.2 PG (ref 26–34)
MCH RBC QN AUTO: 28.9 PG (ref 26–34)
MCHC RBC AUTO-ENTMCNC: 33.3 G/DL (ref 31–37)
MCHC RBC AUTO-ENTMCNC: 35.2 G/DL (ref 31–37)
MCV RBC AUTO: 82.1 FL
MCV RBC AUTO: 84.6 FL
METAMYELOCYTES # BLD: 0.56 X10(3) UL
METAMYELOCYTES NFR BLD: 3 %
MONOCYTES # BLD: 0.54 X10(3) UL (ref 0.1–1)
MONOCYTES # BLD: 1.3 X10(3) UL (ref 0.1–1)
MONOCYTES NFR BLD: 3 %
MONOCYTES NFR BLD: 7 %
MORPHOLOGY: NORMAL
MYELOCYTES # BLD: 0.19 X10(3) UL
MYELOCYTES NFR BLD: 1 %
NEUTROPHILS # BLD AUTO: 6.99 X10 (3) UL (ref 1.5–7.7)
NEUTROPHILS # BLD AUTO: 7.6 X10 (3) UL (ref 1.5–7.7)
NEUTROPHILS NFR BLD: 37 %
NEUTROPHILS NFR BLD: 47 %
NEUTS BAND NFR BLD: 2 %
NEUTS BAND NFR BLD: 3 %
NEUTS HYPERSEG # BLD: 7.4 X10(3) UL (ref 1.5–7.7)
NEUTS HYPERSEG # BLD: 8.87 X10(3) UL (ref 1.5–7.7)
OSMOLALITY SERPL CALC.SUM OF ELEC: 283 MOSM/KG (ref 275–295)
OSMOLALITY SERPL CALC.SUM OF ELEC: 286 MOSM/KG (ref 275–295)
PATIENT FASTING Y/N/NP: NO
PLATELET # BLD AUTO: 141 10(3)UL (ref 150–450)
PLATELET # BLD AUTO: 146 10(3)UL (ref 150–450)
PLATELET MORPHOLOGY: NORMAL
PLATELET MORPHOLOGY: NORMAL
POTASSIUM SERPL-SCNC: 3.4 MMOL/L (ref 3.5–5.1)
POTASSIUM SERPL-SCNC: 3.6 MMOL/L (ref 3.5–5.1)
RBC # BLD AUTO: 3.46 X10(6)UL
RBC # BLD AUTO: 3.51 X10(6)UL
RETICS # AUTO: 229.6 X10(3) UL (ref 22.5–147.5)
RETICS/RBC NFR AUTO: 6.5 %
RH BLOOD TYPE: POSITIVE
SARS-COV-2 RNA RESP QL NAA+PROBE: NOT DETECTED
SODIUM SERPL-SCNC: 136 MMOL/L (ref 136–145)
SODIUM SERPL-SCNC: 137 MMOL/L (ref 136–145)
TOTAL CELLS COUNTED: 100
TOTAL CELLS COUNTED: 100
TROPONIN I SERPL-MCNC: <0.045 NG/ML (ref ?–0.04)
WBC # BLD AUTO: 18.1 X10(3) UL (ref 4–11)
WBC # BLD AUTO: 18.5 X10(3) UL (ref 4–11)

## 2021-01-07 PROCEDURE — G2212 PROLONG OUTPT/OFFICE VIS: HCPCS | Performed by: INTERNAL MEDICINE

## 2021-01-07 PROCEDURE — 99253 IP/OBS CNSLTJ NEW/EST LOW 45: CPT | Performed by: INTERNAL MEDICINE

## 2021-01-07 PROCEDURE — B543ZZA ULTRASONOGRAPHY OF RIGHT JUGULAR VEINS, GUIDANCE: ICD-10-PCS | Performed by: RADIOLOGY

## 2021-01-07 PROCEDURE — 05HM33Z INSERTION OF INFUSION DEVICE INTO RIGHT INTERNAL JUGULAR VEIN, PERCUTANEOUS APPROACH: ICD-10-PCS | Performed by: RADIOLOGY

## 2021-01-07 PROCEDURE — 99223 1ST HOSP IP/OBS HIGH 75: CPT | Performed by: HOSPITALIST

## 2021-01-07 PROCEDURE — 71045 X-RAY EXAM CHEST 1 VIEW: CPT | Performed by: RADIOLOGY

## 2021-01-07 PROCEDURE — 99215 OFFICE O/P EST HI 40 MIN: CPT | Performed by: INTERNAL MEDICINE

## 2021-01-07 RX ORDER — LIDOCAINE HYDROCHLORIDE 10 MG/ML
INJECTION, SOLUTION INFILTRATION; PERINEURAL
Status: COMPLETED
Start: 2021-01-07 | End: 2021-01-07

## 2021-01-07 RX ORDER — ACETAMINOPHEN 500 MG
1000 TABLET ORAL ONCE
Status: COMPLETED | OUTPATIENT
Start: 2021-01-07 | End: 2021-01-07

## 2021-01-07 RX ORDER — HEPARIN SODIUM 5000 [USP'U]/ML
INJECTION, SOLUTION INTRAVENOUS; SUBCUTANEOUS
Status: COMPLETED
Start: 2021-01-07 | End: 2021-01-07

## 2021-01-07 RX ORDER — ONDANSETRON 2 MG/ML
4 INJECTION INTRAMUSCULAR; INTRAVENOUS EVERY 6 HOURS PRN
Status: DISCONTINUED | OUTPATIENT
Start: 2021-01-07 | End: 2021-01-11

## 2021-01-07 RX ORDER — DIPHENHYDRAMINE HYDROCHLORIDE 50 MG/ML
25 INJECTION INTRAMUSCULAR; INTRAVENOUS ONCE
Status: COMPLETED | OUTPATIENT
Start: 2021-01-07 | End: 2021-01-08

## 2021-01-07 RX ORDER — HEPARIN SODIUM 5000 [USP'U]/ML
5000 INJECTION, SOLUTION INTRAVENOUS; SUBCUTANEOUS EVERY 8 HOURS SCHEDULED
Status: DISCONTINUED | OUTPATIENT
Start: 2021-01-07 | End: 2021-01-11

## 2021-01-07 RX ORDER — ONDANSETRON 2 MG/ML
4 INJECTION INTRAMUSCULAR; INTRAVENOUS EVERY 4 HOURS PRN
Status: DISCONTINUED | OUTPATIENT
Start: 2021-01-07 | End: 2021-01-07

## 2021-01-07 RX ORDER — ACETAMINOPHEN 325 MG/1
650 TABLET ORAL EVERY 6 HOURS PRN
Status: DISCONTINUED | OUTPATIENT
Start: 2021-01-07 | End: 2021-01-11

## 2021-01-07 NOTE — PROGRESS NOTES
Education Record    Learner:  Patient    Disease / Diagnosis:    Barriers / Limitations:  None   Comments:    Method:  Discussion   Comments:    General Topics:  Plan of care reviewed   Comments:    Outcome:  Shows understanding   Comments:post hospital f/

## 2021-01-07 NOTE — PROGRESS NOTES
Hematology Clinic Follow Up Visit    Patient Name: Sil Mora  Medical Record Number: [id number]    YOB: 1990   PCP: Dr. Mg Webb    Reason for Consultation:  Sil Mora was seen today for the diagnosis of TTP mouth., Disp: , Rfl:       Allergies:   No Known Allergies    Psychosocial History:  Social History    Social History Narrative      , has a 3year-old daughter (as of 2021).   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  Is going t Component Value Date    WBC 19.0 (H) 01/05/2021    WBC 18.8 (H) 01/04/2021    WBC 16.6 (H) 01/03/2021    HGB 8.2 (L) 01/05/2021    HGB 8.0 (L) 01/04/2021    HGB 8.8 (L) 01/03/2021    HCT 23.3 (L) 01/05/2021    MCV 81.5 01/05/2021    MCH 28.7 01/05/2021 473 (H) 01/05/2021     (H) 01/04/2021     (H) 01/03/2021     (H) 01/02/2021     (H) 01/01/2021     Lab Results   Component Value Date    HAPT <7.8 (L) 01/07/2021    HAPT <7.8 (L) 01/02/2021     No results found for: [employer] TTP.   -likely has had a partial response to with recent IVIG and steroids that were given previously though this is not adequate treatment and therefore without additional therapy would not expect this partial response to be sustained.   -discussed at [place]

## 2021-01-07 NOTE — CONSULTS
[facility]  Report of Consultation    Mariammarisela Webermarcy Amaya Patient Status:  Emergency    1990 MRN [id number]   Location [address] Attending Shayne Baltazar 75 Day # 0 PCP Alena Montoya MD       As (B-12) 500 MCG Oral Tab, Take by mouth. Review of Systems:  Please see HPI for pertinent positives. 10 point review of systems otherwise reviewed and negative.      Physical Exam:  /70   Pulse 114   Temp 97.9 °F (36.6 °C) (Temporal)   Resp 18

## 2021-01-07 NOTE — ED PROVIDER NOTES
Patient Seen in: [facility]      History   Patient presents with: Other    Stated Complaint: here for admission    HPI/Subjective:   HPI    16-year-old presents today with a diagnosis of TTP.   On January 1, patient noticed she membranes are moist.   Eyes:      Extraocular Movements: Extraocular movements intact. Pupils: Pupils are equal, round, and reactive to light. Neck:      Musculoskeletal: Neck supple.    Cardiovascular:      Rate and Rhythm: Normal rate and regular r Nephrology was consulted in the emergency department and arranged for plasmapheresis. IR to place catheter in the ED. Will order basic labs. Patient otherwise nontoxic and well-appearing at this time.   I spoke with the hospitalist in regards to LOS RUGGIERO

## 2021-01-08 ENCOUNTER — TELEPHONE (OUTPATIENT)
Dept: HEMATOLOGY/ONCOLOGY | Facility: HOSPITAL | Age: 31
End: 2021-01-08

## 2021-01-08 ENCOUNTER — SOCIAL WORK SERVICES (OUTPATIENT)
Dept: HEMATOLOGY/ONCOLOGY | Facility: HOSPITAL | Age: 31
End: 2021-01-08

## 2021-01-08 LAB
ALBUMIN SERPL-MCNC: 3.2 G/DL (ref 3.4–5)
ALBUMIN/GLOB SERPL: 0.9 {RATIO} (ref 1–2)
ALP LIVER SERPL-CCNC: 66 U/L
ALT SERPL-CCNC: 133 U/L
ANION GAP SERPL CALC-SCNC: 4 MMOL/L (ref 0–18)
AST SERPL-CCNC: 94 U/L (ref 15–37)
BASOPHILS # BLD: 0 X10(3) UL (ref 0–0.2)
BASOPHILS NFR BLD: 0 %
BILIRUB SERPL-MCNC: 0.4 MG/DL (ref 0.1–2)
BUN BLD-MCNC: 14 MG/DL (ref 7–18)
BUN/CREAT SERPL: 16.7 (ref 10–20)
CALCIUM BLD-MCNC: 9.2 MG/DL (ref 8.5–10.1)
CHLORIDE SERPL-SCNC: 102 MMOL/L (ref 98–112)
CO2 SERPL-SCNC: 30 MMOL/L (ref 21–32)
CREAT BLD-MCNC: 0.84 MG/DL
DEPRECATED RDW RBC AUTO: 46 FL (ref 35.1–46.3)
EOSINOPHIL # BLD: 0.12 X10(3) UL (ref 0–0.7)
EOSINOPHIL NFR BLD: 1 %
ERYTHROCYTE [DISTWIDTH] IN BLOOD BY AUTOMATED COUNT: 15.4 % (ref 11–15)
GLOBULIN PLAS-MCNC: 3.7 G/DL (ref 2.8–4.4)
GLUCOSE BLD-MCNC: 106 MG/DL (ref 70–99)
HBV CORE AB SERPL QL IA: REACTIVE
HBV CORE IGM SER QL: NONREACTIVE
HBV SURFACE AB SER QL: REACTIVE
HBV SURFACE AB SERPL IA-ACNC: >1000 MIU/ML
HBV SURFACE AG SER-ACNC: <0.1 [IU]/L
HBV SURFACE AG SERPL QL IA: NONREACTIVE
HCT VFR BLD AUTO: 26.9 %
HGB BLD-MCNC: 9.2 G/DL
LDH SERPL L TO P-CCNC: 262 U/L
LYMPHOCYTES NFR BLD: 2.56 X10(3) UL (ref 1–4)
LYMPHOCYTES NFR BLD: 21 %
M PROTEIN MFR SERPL ELPH: 6.9 G/DL (ref 6.4–8.2)
MCH RBC QN AUTO: 28.8 PG (ref 26–34)
MCHC RBC AUTO-ENTMCNC: 34.2 G/DL (ref 31–37)
MCV RBC AUTO: 84.3 FL
METAMYELOCYTES # BLD: 0.12 X10(3) UL
METAMYELOCYTES NFR BLD: 1 %
MONOCYTES # BLD: 0.37 X10(3) UL (ref 0.1–1)
MONOCYTES NFR BLD: 3 %
MORPHOLOGY: NORMAL
NEUTROPHILS # BLD AUTO: 6.71 X10 (3) UL (ref 1.5–7.7)
NEUTROPHILS NFR BLD: 70 %
NEUTS BAND NFR BLD: 4 %
NEUTS HYPERSEG # BLD: 9.03 X10(3) UL (ref 1.5–7.7)
OSMOLALITY SERPL CALC.SUM OF ELEC: 283 MOSM/KG (ref 275–295)
PLATELET # BLD AUTO: 132 10(3)UL (ref 150–450)
PLATELET MORPHOLOGY: NORMAL
POTASSIUM SERPL-SCNC: 4.2 MMOL/L (ref 3.5–5.1)
RBC # BLD AUTO: 3.19 X10(6)UL
SODIUM SERPL-SCNC: 136 MMOL/L (ref 136–145)
TOTAL CELLS COUNTED: 100
WBC # BLD AUTO: 12.2 X10(3) UL (ref 4–11)

## 2021-01-08 PROCEDURE — 99232 SBSQ HOSP IP/OBS MODERATE 35: CPT | Performed by: INTERNAL MEDICINE

## 2021-01-08 PROCEDURE — 99232 SBSQ HOSP IP/OBS MODERATE 35: CPT | Performed by: HOSPITALIST

## 2021-01-08 PROCEDURE — 99255 IP/OBS CONSLTJ NEW/EST HI 80: CPT | Performed by: INTERNAL MEDICINE

## 2021-01-08 PROCEDURE — 6A551Z3 PHERESIS OF PLASMA, MULTIPLE: ICD-10-PCS | Performed by: INTERNAL MEDICINE

## 2021-01-08 RX ORDER — DIPHENHYDRAMINE HCL 25 MG
25 CAPSULE ORAL ONCE
Status: DISCONTINUED | OUTPATIENT
Start: 2021-01-08 | End: 2021-01-08

## 2021-01-08 RX ORDER — DIPHENHYDRAMINE HYDROCHLORIDE 50 MG/ML
25 INJECTION INTRAMUSCULAR; INTRAVENOUS ONCE AS NEEDED
Status: DISPENSED | OUTPATIENT
Start: 2021-01-08 | End: 2021-01-08

## 2021-01-08 RX ORDER — SODIUM CHLORIDE 9 MG/ML
INJECTION, SOLUTION INTRAVENOUS ONCE
Status: DISCONTINUED | OUTPATIENT
Start: 2021-01-08 | End: 2021-01-11

## 2021-01-08 RX ORDER — FAMOTIDINE 10 MG/ML
20 INJECTION, SOLUTION INTRAVENOUS ONCE AS NEEDED
Status: ACTIVE | OUTPATIENT
Start: 2021-01-08 | End: 2021-01-08

## 2021-01-08 RX ORDER — DIPHENHYDRAMINE HCL 25 MG
25 CAPSULE ORAL ONCE
Status: COMPLETED | OUTPATIENT
Start: 2021-01-08 | End: 2021-01-08

## 2021-01-08 RX ORDER — DIPHENHYDRAMINE HYDROCHLORIDE 50 MG/ML
25 INJECTION INTRAMUSCULAR; INTRAVENOUS ONCE
Status: COMPLETED | OUTPATIENT
Start: 2021-01-08 | End: 2021-01-08

## 2021-01-08 RX ORDER — FOLIC ACID 1 MG/1
1 TABLET ORAL DAILY
Status: DISCONTINUED | OUTPATIENT
Start: 2021-01-08 | End: 2021-01-11

## 2021-01-08 RX ORDER — PANTOPRAZOLE SODIUM 40 MG/1
40 TABLET, DELAYED RELEASE ORAL
Status: DISCONTINUED | OUTPATIENT
Start: 2021-01-08 | End: 2021-01-11

## 2021-01-08 RX ORDER — ACETAMINOPHEN 325 MG/1
650 TABLET ORAL ONCE
Status: COMPLETED | OUTPATIENT
Start: 2021-01-08 | End: 2021-01-08

## 2021-01-08 RX ORDER — METHYLPREDNISOLONE SODIUM SUCCINATE 125 MG/2ML
100 INJECTION, POWDER, LYOPHILIZED, FOR SOLUTION INTRAMUSCULAR; INTRAVENOUS ONCE AS NEEDED
Status: COMPLETED | OUTPATIENT
Start: 2021-01-08 | End: 2021-01-08

## 2021-01-08 RX ORDER — SODIUM CHLORIDE 9 MG/ML
INJECTION, SOLUTION INTRAVENOUS ONCE
Status: COMPLETED | OUTPATIENT
Start: 2021-01-08 | End: 2021-01-08

## 2021-01-08 RX ORDER — METHYLPREDNISOLONE SODIUM SUCCINATE 40 MG/ML
40 INJECTION, POWDER, LYOPHILIZED, FOR SOLUTION INTRAMUSCULAR; INTRAVENOUS DAILY
Status: DISCONTINUED | OUTPATIENT
Start: 2021-01-08 | End: 2021-01-11

## 2021-01-08 NOTE — TELEPHONE ENCOUNTER
Germaine Edmonds called asking for a letter for Jeramy to be excused from work for a minimum of 1 month. He is asking for a call back.

## 2021-01-08 NOTE — CONSULTS
Hematology Initial Consultation Note    Patient Name: Anum Gibson  Medical Record Number: [id number]    YOB: 1990   Date of Consultation: 1/8/2021   Physician requesting consultation: Dr. Malcolm Ortiz    Reason for Consultation: [COMPLETED] Potassium Chloride ER (K-DUR M20) CR tab 40 mEq, 40 mEq, Oral, Once, Delia Hoang MD, 40 mEq at 01/03/21 1431    •  [COMPLETED] Potassium Chloride ER (K-DUR M20) CR tab 40 mEq, 40 mEq, Oral, Once, Johanna White DO, 40 mEq at 01/02/21 7942 the HPI    Vital Signs:  Height: 157.5 cm (5' 2\") (01/07 1701)  Weight: 52.4 kg (115 lb 9.6 oz) (01/07 2048)  BSA (Calculated - sq m): 1.52 sq meters (01/07 1701)  Pulse: 75 (01/08 0345)  BP: 97/56 (01/08 0345)  Temp: 98.3 °F (36.8 °C) (01/07 2048)  Do No REITCPERCENT 3.7 (H) 01/04/2021    REITCPERCENT 1.7 01/02/2021     Lab Results   Component Value Date    RETHE 37.2 (H) 01/07/2021    RETHE 36.3 01/04/2021    RETHE 35.6 01/02/2021     Lab Results   Component Value Date     (H) 01/08/2021     most consistent with acute TTP.   Likely has had a partial response to with recent IVIG and steroids that were given previously when felt to have ITP, though this is not adequate treatment and therefore without additional therapy would not expect this parti

## 2021-01-08 NOTE — PROCEDURES
[address] [facility] Patient Status:  Emergency    1990 MRN [id number]   Location [address] [facility] Attending No att. providers found   Hosp Day # 0 PCP Bk Garcia MD         Brief Procedure Report

## 2021-01-08 NOTE — PROGRESS NOTES
spoke to [employer]; requested letter for 5weeks off work.  sent completed letter to Patient via [employer].

## 2021-01-08 NOTE — H&P
[facility]  History and Physical     Jermay Lakeisha Allan Patient Status:  Emergency    1990 MRN [id number]   Location [address] Attending No att. providers found   Hosp Day # 0 PCP Cathy Guillen MD     Ch completed. Pertinent positives and negatives noted in the HPI.     Physical Exam:    BP 96/59   Pulse 87   Temp 97.9 °F (36.6 °C) (Temporal)   Resp 20   Ht 157.5 cm (5' 2\")   Wt 117 lb (53.1 kg)   LMP 12/30/2020   SpO2 99%   BMI 21.40 kg/m²   General: N 01/01/21  1107   PTP 13.7   INR 1.02       Recent Labs   Lab 01/07/21  1120   TROP <0.045       Imaging: Imaging data reviewed in Epic. ASSESSMENT / PLAN:     1. TTP  1. Nephro, onc on c/s  2. Plasma exchange x5. RIJ placed in ED  3. Steroids  4.  Ulysees Liming

## 2021-01-08 NOTE — PROGRESS NOTES
[facility]  Progress note     82 Sadia Amaya Patient Status:  Emergency    1990 MRN [id number]   Location [address] [facility] [address] Attending No att. providers found   Hosp Day # 1 PCP Diana Campbell MD     Chief Com Creatinine Clearance: 76.7 mL/min (based on SCr of 0.84 mg/dL). Recent Labs   Lab 01/01/21  1107   PTP 13.7   INR 1.02       Recent Labs   Lab 01/07/21  1120   TROP <0.045       Imaging: Imaging data reviewed in Epic.       ASSESSMENT / PLAN:     1. TTP

## 2021-01-08 NOTE — PAYOR COMM NOTE
--------------  ADMISSION REVIEW     Payor: Pelon Velascojuliet Whitaker #:  [id number]  Authorization Number: [id number]       ED Provider Notes        Patient Seen in: [facility]       History   Patient presents with:   Other    Stated C Extraocular movements intact. Pupils: Pupils are equal, round, and reactive to light. Neck:      Musculoskeletal: Neck supple. Cardiovascular:      Rate and Rhythm: Normal rate and regular rhythm.    Pulmonary:      Effort: Pulmonary effort is norm plasmapheresis. IR to place catheter in the ED. Will order basic labs. Patient otherwise nontoxic and well-appearing at this time. I spoke with the hospitalist in regards to admission.       Disposition and Plan     Clinical Impression:  TTP (thrombotic neurological deficits. CNII-XII grossly intact. Musculoskeletal: Moves all extremities. Extremities: No edema or cyanosis. Integument: No rashes or lesions. Some bruises on leg  Psychiatric: Appropriate mood and affect.       Diagnostic Data:      Labs: above.     Procedure Performed: Ultrasound Guided Temporary Dialysis Catheter Placement     Anesthesia: 1% lidocaine     EBL: 1cc     Complications: None     Summary of Case:   Exam performed at bedside because of patient's clinical condition.  Right intern

## 2021-01-08 NOTE — PLAN OF CARE
NURSING ADMISSION NOTE      Patient admitted via Cart  Oriented to room. 408  Safety precautions initiated. Bed in low position. Call light in reach. Patient A&O x 4.  VS stable, room air. No complaints of pain, migraine pain has resolved.   Eye m appropriate  Outcome: Progressing     Problem: SAFETY ADULT - FALL  Goal: Free from fall injury  Description: INTERVENTIONS:  - Assess pt frequently for physical needs  - Identify cognitive and physical deficits and behaviors that affect risk of falls.   -

## 2021-01-08 NOTE — PROGRESS NOTES
[facility]  Nephrology Progress Note    [address] Attending:   Colt Foster MD       Assessment and Plan:    1) TTP- c/w recent onset of severe thrombocytopenia, anemia with elevated LDH, undetectable haptoglobin and [id number] activity reviewed.     Meds:     •  folic acid (FOLVITE) tab 1 mg, 1 mg, Oral, Daily    •  methylPREDNISolone Sodium Succ (Solu-MEDROL) injection 40 mg, 40 mg, Intravenous, Daily    •  Pantoprazole Sodium (PROTONIX) EC tab 40 mg, 40 mg, Oral, QAM AC    •  Heparin So

## 2021-01-08 NOTE — PROCEDURES
[facility]  Pre-Procedure Note    Name: Leyla Santos  MRN#: [id number]  : 1990    Procedure:  Ultrasound Temporary Dialysis Catheter Placement    Indication: TTP Needs plasmapharesis    Allergies:    No Known Allergies    Pertinent M

## 2021-01-08 NOTE — PROGRESS NOTES
[facility] [address]  [place], [address]  ?  01/08/21  ? Re: Vladimir Brown  ?  To Whom It May Concern:    Jeramy Morris was admitted to [facility] on 1/7/2021 and expected to discharge on 1/11/2021.     Please

## 2021-01-09 LAB
ALBUMIN SERPL-MCNC: 3 G/DL (ref 3.4–5)
ALBUMIN/GLOB SERPL: 0.9 {RATIO} (ref 1–2)
ALP LIVER SERPL-CCNC: 66 U/L
ALT SERPL-CCNC: 103 U/L
ANION GAP SERPL CALC-SCNC: 8 MMOL/L (ref 0–18)
AST SERPL-CCNC: 59 U/L (ref 15–37)
BASOPHILS # BLD: 0 X10(3) UL (ref 0–0.2)
BASOPHILS NFR BLD: 0 %
BILIRUB SERPL-MCNC: 0.2 MG/DL (ref 0.1–2)
BLOOD TYPE BARCODE: 9500
BUN BLD-MCNC: 14 MG/DL (ref 7–18)
BUN/CREAT SERPL: 20.6 (ref 10–20)
CALCIUM BLD-MCNC: 8.7 MG/DL (ref 8.5–10.1)
CHLORIDE SERPL-SCNC: 104 MMOL/L (ref 98–112)
CO2 SERPL-SCNC: 25 MMOL/L (ref 21–32)
CREAT BLD-MCNC: 0.68 MG/DL
DEPRECATED RDW RBC AUTO: 46.5 FL (ref 35.1–46.3)
EOSINOPHIL # BLD: 0 X10(3) UL (ref 0–0.7)
EOSINOPHIL NFR BLD: 0 %
ERYTHROCYTE [DISTWIDTH] IN BLOOD BY AUTOMATED COUNT: 15.7 % (ref 11–15)
GLOBULIN PLAS-MCNC: 3.5 G/DL (ref 2.8–4.4)
GLUCOSE BLD-MCNC: 145 MG/DL (ref 70–99)
HCT VFR BLD AUTO: 27.2 %
HGB BLD-MCNC: 9.3 G/DL
LDH SERPL L TO P-CCNC: 195 U/L
LYMPHOCYTES NFR BLD: 0.76 X10(3) UL (ref 1–4)
LYMPHOCYTES NFR BLD: 5 %
M PROTEIN MFR SERPL ELPH: 6.5 G/DL (ref 6.4–8.2)
MCH RBC QN AUTO: 28.9 PG (ref 26–34)
MCHC RBC AUTO-ENTMCNC: 34.2 G/DL (ref 31–37)
MCV RBC AUTO: 84.5 FL
MONOCYTES # BLD: 0.46 X10(3) UL (ref 0.1–1)
MONOCYTES NFR BLD: 3 %
MORPHOLOGY: NORMAL
NEUTROPHILS # BLD AUTO: 12.23 X10 (3) UL (ref 1.5–7.7)
NEUTROPHILS NFR BLD: 86 %
NEUTS BAND NFR BLD: 6 %
NEUTS HYPERSEG # BLD: 13.98 X10(3) UL (ref 1.5–7.7)
OSMOLALITY SERPL CALC.SUM OF ELEC: 287 MOSM/KG (ref 275–295)
PLATELET # BLD AUTO: 196 10(3)UL (ref 150–450)
PLATELET MORPHOLOGY: NORMAL
POTASSIUM SERPL-SCNC: 4.1 MMOL/L (ref 3.5–5.1)
RBC # BLD AUTO: 3.22 X10(6)UL
SODIUM SERPL-SCNC: 137 MMOL/L (ref 136–145)
T4 FREE SERPL-MCNC: 1 NG/DL (ref 0.8–1.7)
TOTAL CELLS COUNTED: 100
TSI SER-ACNC: 0.61 MIU/ML (ref 0.36–3.74)
WBC # BLD AUTO: 15.2 X10(3) UL (ref 4–11)

## 2021-01-09 PROCEDURE — 99232 SBSQ HOSP IP/OBS MODERATE 35: CPT | Performed by: INTERNAL MEDICINE

## 2021-01-09 PROCEDURE — 99232 SBSQ HOSP IP/OBS MODERATE 35: CPT | Performed by: HOSPITALIST

## 2021-01-09 RX ORDER — SODIUM CHLORIDE 9 MG/ML
INJECTION, SOLUTION INTRAVENOUS ONCE
Status: COMPLETED | OUTPATIENT
Start: 2021-01-09 | End: 2021-01-09

## 2021-01-09 RX ORDER — SODIUM CHLORIDE 9 MG/ML
INJECTION, SOLUTION INTRAVENOUS ONCE
Status: DISCONTINUED | OUTPATIENT
Start: 2021-01-09 | End: 2021-01-11

## 2021-01-09 RX ORDER — DIPHENHYDRAMINE HYDROCHLORIDE 50 MG/ML
25 INJECTION INTRAMUSCULAR; INTRAVENOUS ONCE
Status: COMPLETED | OUTPATIENT
Start: 2021-01-09 | End: 2021-01-09

## 2021-01-09 NOTE — PLAN OF CARE
Alert and oriented, denies pain, for plasmapheresis today, latest platelet counts over 146.0, still has scattered bruising. Denies any bleeding. Seen by hematologist, Miriam Perdue was ordered.    1300 Plasmapheresis was started, 2/5 with plasma exchange, pre med

## 2021-01-09 NOTE — PROGRESS NOTES
Heme/Onc Progress Note - [facility]      Chief Complaint:    Follow up for evaluation and management of TTP    Interim History:      She had Rituxan yesterday. She had feeling of itching in her throat.  She was given extra methylprednisolone 100 mg and benadryl 2 consolidation.    =====  CONCLUSION:  Right central line with the tip overlying the expected region of the cavoatrial junction. No pneumothorax or pleural effusion is seen. Impression:     TTP with ODNRPU23<5%: She is s/p PEX x 2 and s/p rituximab.

## 2021-01-09 NOTE — PROGRESS NOTES
[facility]  Nephrology Progress Note    [address] Attending:   Marianne Liao MD       Feels OK  Denies cp/sob  No n/v  No bleeding       •  folic acid (FOLVITE) tab 1 mg, 1 mg, Oral, Daily    •  methylPREDNISolone Sodium Succ (Solu-MEDR 01/07/21  1721 01/08/21  0712 01/09/21  0518    136 136 137   K 3.4* 3.6 4.2 4.1    103 102 104   CO2 28.0 28.0 30.0 25.0   BUN 17 15 14 14   CREATSERUM 0.82 0.83 0.84 0.68   CA 8.4* 8.6 9.2 8.7       Recent Labs     01/07/21  1120 01/07/21  17

## 2021-01-09 NOTE — PLAN OF CARE
Assumed patient care at [address] [place] [address]. Rituxan on hold, stopped by morning RN due to adverse reactions. Per report Dr. Tin Carvalho aware, new orders given and instructed to continue the Rituxan if symptoms resolved and patient can tolerate.      2030: Rituxan re-started,

## 2021-01-09 NOTE — PROGRESS NOTES
[facility]  Progress note     [address] Rodriguez Sanjay Monique Patient Status:  Emergency    1990 MRN [id number]   Location [address] [facility] [address] Attending No att. providers found   Hosp Day # 2 PCP Alli Davila MD     Chief Com Imaging data reviewed in Epic. ASSESSMENT / PLAN:     1. TTP  1. S/p IVIG  2. S/p rituxan but had allergic rxn  3. Steroids ongoing per heme/onc  4. Now today is day 3 of 5 plasma exchange  5. Renal/heme following  2.  Leukocytosis, anemia, thrombocyto

## 2021-01-09 NOTE — PROGRESS NOTES
Patient has been up ambulating in room with a steady gait. Denies c/o pain at this time states that she feels bloated. She is waiting to receive her plasma exchange this evening. Appetite has been good no c/o nausea. No new bruises noted no bleeding noted.

## 2021-01-10 LAB
ANION GAP SERPL CALC-SCNC: 3 MMOL/L (ref 0–18)
BASOPHILS # BLD: 0 X10(3) UL (ref 0–0.2)
BASOPHILS NFR BLD: 0 %
BUN BLD-MCNC: 12 MG/DL (ref 7–18)
BUN/CREAT SERPL: 12.5 (ref 10–20)
CALCIUM BLD-MCNC: 8.9 MG/DL (ref 8.5–10.1)
CHLORIDE SERPL-SCNC: 104 MMOL/L (ref 98–112)
CO2 SERPL-SCNC: 31 MMOL/L (ref 21–32)
CREAT BLD-MCNC: 0.96 MG/DL
DEPRECATED RDW RBC AUTO: 48.4 FL (ref 35.1–46.3)
EOSINOPHIL # BLD: 0.17 X10(3) UL (ref 0–0.7)
EOSINOPHIL NFR BLD: 1 %
ERYTHROCYTE [DISTWIDTH] IN BLOOD BY AUTOMATED COUNT: 16.2 % (ref 11–15)
GLUCOSE BLD-MCNC: 86 MG/DL (ref 70–99)
HCT VFR BLD AUTO: 26.6 %
HGB BLD-MCNC: 9 G/DL
LDH SERPL L TO P-CCNC: 221 U/L
LYMPHOCYTES NFR BLD: 29 %
LYMPHOCYTES NFR BLD: 4.93 X10(3) UL (ref 1–4)
MCH RBC QN AUTO: 29 PG (ref 26–34)
MCHC RBC AUTO-ENTMCNC: 33.8 G/DL (ref 31–37)
MCV RBC AUTO: 85.8 FL
MONOCYTES # BLD: 1.36 X10(3) UL (ref 0.1–1)
MONOCYTES NFR BLD: 8 %
MORPHOLOGY: NORMAL
MYELOCYTES # BLD: 0.17 X10(3) UL
MYELOCYTES NFR BLD: 1 %
NEUTROPHILS # BLD AUTO: 9.52 X10 (3) UL (ref 1.5–7.7)
NEUTROPHILS NFR BLD: 59 %
NEUTS BAND NFR BLD: 2 %
NEUTS HYPERSEG # BLD: 10.37 X10(3) UL (ref 1.5–7.7)
OSMOLALITY SERPL CALC.SUM OF ELEC: 285 MOSM/KG (ref 275–295)
PLATELET # BLD AUTO: 236 10(3)UL (ref 150–450)
PLATELET MORPHOLOGY: NORMAL
POTASSIUM SERPL-SCNC: 3.8 MMOL/L (ref 3.5–5.1)
RBC # BLD AUTO: 3.1 X10(6)UL
SODIUM SERPL-SCNC: 138 MMOL/L (ref 136–145)
TOTAL CELLS COUNTED: 100
WBC # BLD AUTO: 17 X10(3) UL (ref 4–11)

## 2021-01-10 PROCEDURE — 99232 SBSQ HOSP IP/OBS MODERATE 35: CPT | Performed by: INTERNAL MEDICINE

## 2021-01-10 PROCEDURE — 99232 SBSQ HOSP IP/OBS MODERATE 35: CPT | Performed by: HOSPITALIST

## 2021-01-10 RX ORDER — SODIUM CHLORIDE 9 MG/ML
INJECTION, SOLUTION INTRAVENOUS ONCE
Status: DISCONTINUED | OUTPATIENT
Start: 2021-01-10 | End: 2021-01-11

## 2021-01-10 RX ORDER — DIPHENHYDRAMINE HYDROCHLORIDE 50 MG/ML
25 INJECTION INTRAMUSCULAR; INTRAVENOUS ONCE
Status: COMPLETED | OUTPATIENT
Start: 2021-01-10 | End: 2021-01-10

## 2021-01-10 NOTE — PLAN OF CARE
Assumed care of patient at [address] [facility]. Monitor on tele-ST/NSR,  in place. Patient had plasma exchange, no reaction noted. VSS.  Labs in am. Will continue to monitor

## 2021-01-10 NOTE — PROGRESS NOTES
[facility]  Nephrology Progress Note    [address] [place] Attending:   Sariah Weldon MD       Feels OK  Denies cp/sob  Starting 4th PLEX today       •  calcium gluconate 3 g in sodium chloride 0.9% 250 mL IVPB, 3 g, Intravenous, Once    •  0.9% 01/10/21  0730   WBC 18.5* 12.2* 15.2* 17.0*   HGB 10.0* 9.2* 9.3* 9.0*   MCV 82.1 84.3 84.5 85.8   .0* 132.0* 196.0 236.0   BAND 3 4 6 2       Recent Labs     01/07/21  1721 01/08/21  0712 01/09/21  0518 01/10/21  0730    136 137 138   K 3.6

## 2021-01-10 NOTE — PROGRESS NOTES
Patient has been up ambulating in room with a steady gait. She denies any c/o pain . Has a good appetite states that sometimes she has nausea but it goes away without anything. Denies any adverse reaction from plasma exchange this afternoon.  Patient Anishaha Rubi

## 2021-01-10 NOTE — PROGRESS NOTES
[facility]  Progress note     [address] Sanjay Langesandip Patient Status:  Emergency    1990 MRN [id number]   [address] [facility] [address] Attending No att. providers found   Hosp Day # 3 PCP Diana Campbell MD     Chief Com heme/onc  4. Now today is day 4 of 5 plasma exchange  2.  Leukocytosis, anemia, thrombocytopenia 2/2 above-monitor  3. transaminitis  Hopefully discharge on Tuesday afternoon/evening  Quality:  · DVT Prophylaxis: scd,heparin  · CODE status: full  · Reyes: n

## 2021-01-10 NOTE — PLAN OF CARE
Plasma verified by second [employer]. Plasma exchange started. Benadryl provided.  Will continue to monitor

## 2021-01-10 NOTE — PROGRESS NOTES
Heme/Onc Progress Note - [facility]      Chief Complaint:    Follow up for evaluation and management of TTP    Interim History:      She had Rituxan on 1/8/2021. She is s/p PEX x 3 days. She has no new complaints. She has no dyspnea or cough.  She has no headache overlying the expected region of the cavoatrial junction. No pneumothorax or pleural effusion is seen. Impression:     TTP with PBSHRU36<5%: She is s/p PEX x 3 and s/p rituximab (1/8/2021). She has had normalization of LDH and platelets > 663J.  Cont

## 2021-01-11 ENCOUNTER — TELEPHONE (OUTPATIENT)
Dept: HEMATOLOGY/ONCOLOGY | Facility: HOSPITAL | Age: 31
End: 2021-01-11

## 2021-01-11 VITALS
SYSTOLIC BLOOD PRESSURE: 98 MMHG | WEIGHT: 113.5 LBS | BODY MASS INDEX: 20.89 KG/M2 | HEART RATE: 111 BPM | RESPIRATION RATE: 18 BRPM | DIASTOLIC BLOOD PRESSURE: 56 MMHG | HEIGHT: 62 IN | TEMPERATURE: 98 F | OXYGEN SATURATION: 100 %

## 2021-01-11 LAB
ANTIBODY SCREEN: NEGATIVE
BASOPHILS # BLD: 0 X10(3) UL (ref 0–0.2)
BASOPHILS NFR BLD: 0 %
BLOOD TYPE BARCODE: 5100
BLOOD TYPE BARCODE: 5100
BLOOD TYPE BARCODE: 9500
BLOOD TYPE BARCODE: 9500
DEPRECATED RDW RBC AUTO: 52 FL (ref 35.1–46.3)
EOSINOPHIL # BLD: 0 X10(3) UL (ref 0–0.7)
EOSINOPHIL NFR BLD: 0 %
ERYTHROCYTE [DISTWIDTH] IN BLOOD BY AUTOMATED COUNT: 16.3 % (ref 11–15)
HCT VFR BLD AUTO: 30.3 %
HGB BLD-MCNC: 9.6 G/DL
LDH SERPL L TO P-CCNC: 173 U/L
LYMPHOCYTES NFR BLD: 32 %
LYMPHOCYTES NFR BLD: 5.34 X10(3) UL (ref 1–4)
MCH RBC QN AUTO: 28.5 PG (ref 26–34)
MCHC RBC AUTO-ENTMCNC: 31.7 G/DL (ref 31–37)
MCV RBC AUTO: 89.9 FL
METAMYELOCYTES # BLD: 1 X10(3) UL
METAMYELOCYTES NFR BLD: 6 %
MONOCYTES # BLD: 0.5 X10(3) UL (ref 0.1–1)
MONOCYTES NFR BLD: 3 %
MYELOCYTES # BLD: 0.17 X10(3) UL
MYELOCYTES NFR BLD: 1 %
NEUTROPHILS # BLD AUTO: 9.53 X10 (3) UL (ref 1.5–7.7)
NEUTROPHILS NFR BLD: 56 %
NEUTS BAND NFR BLD: 2 %
NEUTS HYPERSEG # BLD: 9.69 X10(3) UL (ref 1.5–7.7)
PLATELET # BLD AUTO: 290 10(3)UL (ref 150–450)
PLATELET MORPHOLOGY: NORMAL
RBC # BLD AUTO: 3.37 X10(6)UL
RH BLOOD TYPE: POSITIVE
TOTAL CELLS COUNTED: 100
WBC # BLD AUTO: 16.7 X10(3) UL (ref 4–11)

## 2021-01-11 PROCEDURE — 99238 HOSP IP/OBS DSCHRG MGMT 30/<: CPT | Performed by: HOSPITALIST

## 2021-01-11 PROCEDURE — 99232 SBSQ HOSP IP/OBS MODERATE 35: CPT | Performed by: INTERNAL MEDICINE

## 2021-01-11 RX ORDER — FOLIC ACID 1 MG/1
1 TABLET ORAL DAILY
Qty: 30 TABLET | Refills: 5 | Status: SHIPPED | OUTPATIENT
Start: 2021-01-11 | End: 2021-09-30

## 2021-01-11 RX ORDER — DIPHENHYDRAMINE HYDROCHLORIDE 50 MG/ML
25 INJECTION INTRAMUSCULAR; INTRAVENOUS ONCE
Status: COMPLETED | OUTPATIENT
Start: 2021-01-11 | End: 2021-01-11

## 2021-01-11 RX ORDER — PREDNISONE 20 MG/1
TABLET ORAL
Qty: 14 TABLET | Refills: 0 | Status: SHIPPED | OUTPATIENT
Start: 2021-01-12 | End: 2021-01-28 | Stop reason: ALTCHOICE

## 2021-01-11 RX ORDER — FOLIC ACID 1 MG/1
1 TABLET ORAL DAILY
Qty: 30 TABLET | Refills: 5 | Status: SHIPPED | OUTPATIENT
Start: 2021-01-11 | End: 2021-01-11

## 2021-01-11 NOTE — PROGRESS NOTES
Hematology Progress Note    Patient Name: Gabbie Gomes  Medical Record Number: [id number]    YOB: 1990     Reason for Consultation:  Gabbie Gomes was seen today for the diagnosis of TTP    Interval events: Patient report 136 136 137 138   K 3.6 4.2 4.1 3.8    102 104 104   CO2 28.0 30.0 25.0 31.0   BUN 15 14 14 12   CREATSERUM 0.83 0.84 0.68 0.96   GFRAA 109 107 135 91   GFRNAA 94 93 117 79   * 106* 145* 86   CA 8.6 9.2 8.7 8.9   TP 9.1* 6.9 6.5  --    ALB 3.1 prompting further testing including TUPDUL11 which came back <5%. Therefore this is most consistent with acute TTP. Had a partial response to with recent IVIG and steroids that were given previously when felt to have ITP.   Once VTAWQX64 level came back c

## 2021-01-11 NOTE — TELEPHONE ENCOUNTER
Bell from [facility] says she scanned in a final report, and she wanted to let the doctor know so he can see this.

## 2021-01-11 NOTE — PROGRESS NOTES
[facility]  Nephrology Progress Note    [address] Attending:   Honey Kapadia MD       Assessment and Plan:    1) TTP- c/w recent onset of severe thrombocytopenia, anemia with elevated LDH, undetectable haptoglobin and JDJBYM93 activity Once    •  0.9% NaCl infusion, , Intravenous, Once    •  folic acid (FOLVITE) tab 1 mg, 1 mg, Oral, Daily    •  methylPREDNISolone Sodium Succ (Solu-MEDROL) injection 40 mg, 40 mg, Intravenous, Daily    •  Pantoprazole Sodium (PROTONIX) EC tab 40 mg, 40 mg

## 2021-01-11 NOTE — PAYOR COMM NOTE
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  She has had normalizat TTP- c/w recent onset of severe thrombocytopenia, anemia with elevated LDH, undetectable haptoglobin and DHAJUT63 activity + schistocytes. S/p pulse steroids x 4 + IVIG x 2.  Tolerating  plasma exchange well- to continue daily x 5 while following hgb / plt

## 2021-01-11 NOTE — PLAN OF CARE
Pt is aox4 on room air and tele and pt has denied any sob or chest pain at this time. Pt VSS and pt has been afebrile during this shift. Pt has denied any pain at this time.  Pt is resting in bed no question at this time will continue to monitor pt during t

## 2021-01-11 NOTE — DISCHARGE SUMMARY
[facility] [place] HOSPITALIST  DISCHARGE SUMMARY     [address] [place] [address] Patient Status:  Inpatient    1990 MRN [id number]   [facility] 4NW-A Attending Joanna Muller MD   Hosp Day # 4 PCP Chito Baer MD     Date of Admission: 2021 Instructions Prescription details   folic acid 1 MG Tabs  Commonly known as: FOLVITE      Take 1 tablet (1 mg total) by mouth daily.    Quantity: 30 tablet  Refills: 5     predniSONE 20 MG Tabs  Commonly known as: Severiano Necessary  Start taking on: January 12, Joshua. Please register at the [address] [facility] on the second floor.   **Important Info for Public [employer]** Because the safety and protection of patients, staff, physicians and community is an absolute priority for [facility] Staff will be available to escort patients inside as needed. Family/friends will be notified when your loved one is ready for pick-up. Thank you for your understanding.                     Vital signs:  Temp:  [98.1 °F (36.7 °C)] 98.1 °F (36.7 °C)  Pulse:

## 2021-01-11 NOTE — TELEPHONE ENCOUNTER
----- Message from Surendra Doyle MD sent at 1/11/2021  8:09 AM CST -----  Please call pt and set up post hosp f/u appt with me with PL labs and infusion appt (rituximab) on 1/14.  Okay to [facility] at 10:45am.     Thanks    Mckay Dumont

## 2021-01-12 LAB
BLOOD TYPE BARCODE: 5100
BLOOD TYPE BARCODE: 5100

## 2021-01-12 NOTE — PROGRESS NOTES
NURSING DISCHARGE NOTE    Discharged Home via Wheelchair. Accompanied by Spouse  Belongings Taken by patient/family. Pt AOx4. VSS. Tolerated 5/5 plasmapheresis treatment. Ok'd for discharge by all services. Temp cath removed with no issue.  Discusse

## 2021-01-14 ENCOUNTER — OFFICE VISIT (OUTPATIENT)
Dept: HEMATOLOGY/ONCOLOGY | Facility: HOSPITAL | Age: 31
End: 2021-01-14
Attending: INTERNAL MEDICINE
Payer: COMMERCIAL

## 2021-01-14 VITALS
SYSTOLIC BLOOD PRESSURE: 115 MMHG | OXYGEN SATURATION: 100 % | WEIGHT: 115.81 LBS | TEMPERATURE: 99 F | BODY MASS INDEX: 21 KG/M2 | HEART RATE: 80 BPM | DIASTOLIC BLOOD PRESSURE: 70 MMHG | RESPIRATION RATE: 17 BRPM

## 2021-01-14 VITALS — BODY MASS INDEX: 22 KG/M2 | HEIGHT: 60.83 IN

## 2021-01-14 DIAGNOSIS — D59.9 ACQUIRED HEMOLYTIC ANEMIA (HCC): ICD-10-CM

## 2021-01-14 DIAGNOSIS — M31.19 TTP (THROMBOTIC THROMBOCYTOPENIC PURPURA) (HCC): Primary | ICD-10-CM

## 2021-01-14 DIAGNOSIS — R74.01 TRANSAMINITIS: ICD-10-CM

## 2021-01-14 PROBLEM — O26.849 FETAL SIZE INCONSISTENT WITH DATES: Status: RESOLVED | Noted: 2019-01-17 | Resolved: 2021-01-14

## 2021-01-14 PROBLEM — O26.849 FETAL SIZE INCONSISTENT WITH DATES (HCC): Status: RESOLVED | Noted: 2019-01-17 | Resolved: 2021-01-14

## 2021-01-14 LAB
ALBUMIN SERPL-MCNC: 3.5 G/DL (ref 3.4–5)
ALBUMIN/GLOB SERPL: 1 {RATIO} (ref 1–2)
ALP LIVER SERPL-CCNC: 99 U/L
ALT SERPL-CCNC: 306 U/L
ANION GAP SERPL CALC-SCNC: 4 MMOL/L (ref 0–18)
AST SERPL-CCNC: 78 U/L (ref 15–37)
BASOPHILS # BLD: 0 X10(3) UL (ref 0–0.2)
BASOPHILS NFR BLD: 0 %
BILIRUB SERPL-MCNC: 0.2 MG/DL (ref 0.1–2)
BUN BLD-MCNC: 15 MG/DL (ref 7–18)
BUN/CREAT SERPL: 21.4 (ref 10–20)
CALCIUM BLD-MCNC: 9.2 MG/DL (ref 8.5–10.1)
CHLORIDE SERPL-SCNC: 104 MMOL/L (ref 98–112)
CO2 SERPL-SCNC: 27 MMOL/L (ref 21–32)
CREAT BLD-MCNC: 0.7 MG/DL
DEPRECATED RDW RBC AUTO: 52.4 FL (ref 35.1–46.3)
EOSINOPHIL # BLD: 0 X10(3) UL (ref 0–0.7)
EOSINOPHIL NFR BLD: 0 %
ERYTHROCYTE [DISTWIDTH] IN BLOOD BY AUTOMATED COUNT: 16.4 % (ref 11–15)
GLOBULIN PLAS-MCNC: 3.5 G/DL (ref 2.8–4.4)
GLUCOSE BLD-MCNC: 107 MG/DL (ref 70–99)
HAPTOGLOB SERPL-MCNC: 79.3 MG/DL (ref 30–200)
HCT VFR BLD AUTO: 31.2 %
HGB BLD-MCNC: 10.5 G/DL
HGB RETIC QN AUTO: 38.6 PG (ref 28.2–36.6)
IMM RETICS NFR: 0.08 RATIO (ref 0.1–0.3)
LDH SERPL L TO P-CCNC: 232 U/L
LYMPHOCYTES NFR BLD: 20 %
LYMPHOCYTES NFR BLD: 4.08 X10(3) UL (ref 1–4)
M PROTEIN MFR SERPL ELPH: 7 G/DL (ref 6.4–8.2)
MCH RBC QN AUTO: 30.3 PG (ref 26–34)
MCHC RBC AUTO-ENTMCNC: 33.7 G/DL (ref 31–37)
MCV RBC AUTO: 90.2 FL
MONOCYTES # BLD: 0.41 X10(3) UL (ref 0.1–1)
MONOCYTES NFR BLD: 2 %
MORPHOLOGY: NORMAL
NEUTROPHILS # BLD AUTO: 13.95 X10 (3) UL (ref 1.5–7.7)
NEUTROPHILS NFR BLD: 76 %
NEUTS BAND NFR BLD: 2 %
NEUTS HYPERSEG # BLD: 15.91 X10(3) UL (ref 1.5–7.7)
OSMOLALITY SERPL CALC.SUM OF ELEC: 281 MOSM/KG (ref 275–295)
PATIENT FASTING Y/N/NP: NO
PLATELET # BLD AUTO: 472 10(3)UL (ref 150–450)
PLATELET MORPHOLOGY: NORMAL
POTASSIUM SERPL-SCNC: 3.5 MMOL/L (ref 3.5–5.1)
RBC # BLD AUTO: 3.46 X10(6)UL
RETICS # AUTO: 164 X10(3) UL (ref 22.5–147.5)
RETICS/RBC NFR AUTO: 4.7 %
SODIUM SERPL-SCNC: 135 MMOL/L (ref 136–145)
TOTAL CELLS COUNTED: 100
WBC # BLD AUTO: 20.4 X10(3) UL (ref 4–11)

## 2021-01-14 PROCEDURE — 85045 AUTOMATED RETICULOCYTE COUNT: CPT

## 2021-01-14 PROCEDURE — 85397 CLOTTING FUNCT ACTIVITY: CPT

## 2021-01-14 PROCEDURE — 96413 CHEMO IV INFUSION 1 HR: CPT

## 2021-01-14 PROCEDURE — 83615 LACTATE (LD) (LDH) ENZYME: CPT

## 2021-01-14 PROCEDURE — 80053 COMPREHEN METABOLIC PANEL: CPT

## 2021-01-14 PROCEDURE — 85007 BL SMEAR W/DIFF WBC COUNT: CPT

## 2021-01-14 PROCEDURE — 85027 COMPLETE CBC AUTOMATED: CPT

## 2021-01-14 PROCEDURE — 96415 CHEMO IV INFUSION ADDL HR: CPT

## 2021-01-14 PROCEDURE — 99215 OFFICE O/P EST HI 40 MIN: CPT | Performed by: INTERNAL MEDICINE

## 2021-01-14 PROCEDURE — 85025 COMPLETE CBC W/AUTO DIFF WBC: CPT

## 2021-01-14 PROCEDURE — 83010 ASSAY OF HAPTOGLOBIN QUANT: CPT

## 2021-01-14 PROCEDURE — 96375 TX/PRO/DX INJ NEW DRUG ADDON: CPT

## 2021-01-14 RX ORDER — DIPHENHYDRAMINE HCL 25 MG
25 CAPSULE ORAL ONCE
Status: COMPLETED | OUTPATIENT
Start: 2021-01-14 | End: 2021-01-14

## 2021-01-14 RX ORDER — METHYLPREDNISOLONE SODIUM SUCCINATE 40 MG/ML
40 INJECTION, POWDER, LYOPHILIZED, FOR SOLUTION INTRAMUSCULAR; INTRAVENOUS ONCE
Status: COMPLETED | OUTPATIENT
Start: 2021-01-14 | End: 2021-01-14

## 2021-01-14 RX ORDER — METHYLPREDNISOLONE SODIUM SUCCINATE 40 MG/ML
40 INJECTION, POWDER, LYOPHILIZED, FOR SOLUTION INTRAMUSCULAR; INTRAVENOUS ONCE
Status: CANCELLED
Start: 2021-01-22 | End: 2021-01-22

## 2021-01-14 RX ORDER — ACETAMINOPHEN 325 MG/1
650 TABLET ORAL ONCE
Status: COMPLETED | OUTPATIENT
Start: 2021-01-14 | End: 2021-01-14

## 2021-01-14 RX ORDER — DIPHENHYDRAMINE HCL 25 MG
25 CAPSULE ORAL ONCE
Status: CANCELLED | OUTPATIENT
Start: 2021-01-22

## 2021-01-14 RX ORDER — METHYLPREDNISOLONE SODIUM SUCCINATE 40 MG/ML
40 INJECTION, POWDER, LYOPHILIZED, FOR SOLUTION INTRAMUSCULAR; INTRAVENOUS ONCE
Status: CANCELLED
Start: 2021-01-28 | End: 2021-01-29

## 2021-01-14 RX ORDER — ACETAMINOPHEN 325 MG/1
650 TABLET ORAL ONCE
Status: CANCELLED | OUTPATIENT
Start: 2021-01-28

## 2021-01-14 RX ORDER — DIPHENHYDRAMINE HCL 25 MG
25 CAPSULE ORAL ONCE
Status: CANCELLED | OUTPATIENT
Start: 2021-01-28

## 2021-01-14 RX ORDER — ACETAMINOPHEN 325 MG/1
650 TABLET ORAL ONCE
Status: CANCELLED | OUTPATIENT
Start: 2021-01-22

## 2021-01-14 RX ADMIN — DIPHENHYDRAMINE HCL 25 MG: 25 MG CAPSULE ORAL at 11:41:00

## 2021-01-14 RX ADMIN — METHYLPREDNISOLONE SODIUM SUCCINATE 40 MG: 40 INJECTION, POWDER, LYOPHILIZED, FOR SOLUTION INTRAMUSCULAR; INTRAVENOUS at 11:41:00

## 2021-01-14 RX ADMIN — ACETAMINOPHEN 650 MG: 325 TABLET ORAL at 11:41:00

## 2021-01-14 NOTE — PROGRESS NOTES
Education Record     Learner:  Patient     Disease / Diagnosis:TTP     Barriers / Limitations:  None                Comments:     Method:  Discussion                Comments:     General Topics:  Plan of care reviewed                Comments:     Outcome:

## 2021-01-14 NOTE — PROGRESS NOTES
Hematology Clinic Follow Up Visit    Patient Name: Amadeo Holter  Medical Record Number: [id number]    YOB: 1990   PCP: Dr. Urban Turcios    Reason for Consultation:  Mateusz Liudaniele was seen today for the diagnosis of TTP History reviewed. No pertinent surgical history. Home Medications:    •  sodium chloride 0.9% SOLN 500 mL with riTUXimab-abbs 500 MG/50ML SOLN 560 mg, Inject 560 mg into the vein once a week.  Starting 1/14/21, Disp: 3 Dose, Rfl: 0    •  PREDNISONE 2 lb 12.8 oz)  01/11/21 : 51.5 kg (113 lb 8 oz)  01/07/21 : 53.5 kg (118 lb)  01/01/21 : 53.1 kg (117 lb)  09/24/19 : 59.3 kg (130 lb 12.8 oz)  03/14/19 : 64.7 kg (142 lb 9.6 oz)    Physical Examination:  General: Patient is alert and oriented, no distress 01/01/2021    INR 1.02 01/01/2021     Lab Results   Component Value Date    REITCPERCENT 4.7 (H) 01/14/2021    REITCPERCENT 6.5 (H) 01/07/2021    REITCPERCENT 3.7 (H) 01/04/2021    REITCPERCENT 1.7 01/02/2021     Lab Results   Component Value Date    RETHE % <5 (L)   HEMOSIDERIN, URINE      Absent Absent     Component      Latest Ref Rng & Units 7/5/6333   HELICOBACTER PYLORI AG FECAL      Negative Negative   HCV AB      Nonreactive  Nonreactive   ROSALES Poly       Negative   HIV Antigen Antibody Combo      Non

## 2021-01-14 NOTE — PROGRESS NOTES
Education Record    Learner:  Patient    Disease / Diagnosis:ITP    Barriers / Limitations:  None   Comments:    Method:  Brief focused   Comments:    General Topics:  Infection, Medication, Pain, Precautions, Procedure, Side effects and symptom management

## 2021-01-16 LAB — ADAMTS13 ACTIVITY: 39 %

## 2021-01-21 ENCOUNTER — OFFICE VISIT (OUTPATIENT)
Dept: HEMATOLOGY/ONCOLOGY | Facility: HOSPITAL | Age: 31
End: 2021-01-21
Attending: INTERNAL MEDICINE
Payer: COMMERCIAL

## 2021-01-21 VITALS
WEIGHT: 117.63 LBS | RESPIRATION RATE: 18 BRPM | DIASTOLIC BLOOD PRESSURE: 64 MMHG | HEART RATE: 102 BPM | BODY MASS INDEX: 22.21 KG/M2 | OXYGEN SATURATION: 99 % | SYSTOLIC BLOOD PRESSURE: 106 MMHG | HEIGHT: 60.83 IN | TEMPERATURE: 99 F

## 2021-01-21 DIAGNOSIS — M31.19 TTP (THROMBOTIC THROMBOCYTOPENIC PURPURA) (HCC): Primary | ICD-10-CM

## 2021-01-21 LAB
ALBUMIN SERPL-MCNC: 3.1 G/DL (ref 3.4–5)
ALBUMIN/GLOB SERPL: 0.9 {RATIO} (ref 1–2)
ALP LIVER SERPL-CCNC: 74 U/L
ALT SERPL-CCNC: 83 U/L
ANION GAP SERPL CALC-SCNC: 5 MMOL/L (ref 0–18)
AST SERPL-CCNC: 22 U/L (ref 15–37)
BASOPHILS # BLD AUTO: 0.04 X10(3) UL (ref 0–0.2)
BASOPHILS NFR BLD AUTO: 0.4 %
BILIRUB SERPL-MCNC: 0.2 MG/DL (ref 0.1–2)
BUN BLD-MCNC: 10 MG/DL (ref 7–18)
BUN/CREAT SERPL: 14.5 (ref 10–20)
CALCIUM BLD-MCNC: 8.8 MG/DL (ref 8.5–10.1)
CHLORIDE SERPL-SCNC: 107 MMOL/L (ref 98–112)
CO2 SERPL-SCNC: 26 MMOL/L (ref 21–32)
CREAT BLD-MCNC: 0.69 MG/DL
DEPRECATED RDW RBC AUTO: 54.8 FL (ref 35.1–46.3)
EOSINOPHIL # BLD AUTO: 0.08 X10(3) UL (ref 0–0.7)
EOSINOPHIL NFR BLD AUTO: 0.7 %
ERYTHROCYTE [DISTWIDTH] IN BLOOD BY AUTOMATED COUNT: 16.3 % (ref 11–15)
GLOBULIN PLAS-MCNC: 3.5 G/DL (ref 2.8–4.4)
GLUCOSE BLD-MCNC: 98 MG/DL (ref 70–99)
HAPTOGLOB SERPL-MCNC: 58.4 MG/DL (ref 30–200)
HCT VFR BLD AUTO: 33.7 %
HGB BLD-MCNC: 11.1 G/DL
HGB RETIC QN AUTO: 34.4 PG (ref 28.2–36.6)
IMM GRANULOCYTES # BLD AUTO: 0.16 X10(3) UL (ref 0–1)
IMM GRANULOCYTES NFR BLD: 1.5 %
IMM RETICS NFR: 0.07 RATIO (ref 0.1–0.3)
LDH SERPL L TO P-CCNC: 192 U/L
LYMPHOCYTES # BLD AUTO: 2.8 X10(3) UL (ref 1–4)
LYMPHOCYTES NFR BLD AUTO: 25.8 %
M PROTEIN MFR SERPL ELPH: 6.6 G/DL (ref 6.4–8.2)
MCH RBC QN AUTO: 30.2 PG (ref 26–34)
MCHC RBC AUTO-ENTMCNC: 32.9 G/DL (ref 31–37)
MCV RBC AUTO: 91.8 FL
MONOCYTES # BLD AUTO: 0.94 X10(3) UL (ref 0.1–1)
MONOCYTES NFR BLD AUTO: 8.6 %
NEUTROPHILS # BLD AUTO: 6.85 X10 (3) UL (ref 1.5–7.7)
NEUTROPHILS # BLD AUTO: 6.85 X10(3) UL (ref 1.5–7.7)
NEUTROPHILS NFR BLD AUTO: 63 %
OSMOLALITY SERPL CALC.SUM OF ELEC: 285 MOSM/KG (ref 275–295)
PATIENT FASTING Y/N/NP: NO
PLATELET # BLD AUTO: 327 10(3)UL (ref 150–450)
POTASSIUM SERPL-SCNC: 3.4 MMOL/L (ref 3.5–5.1)
RBC # BLD AUTO: 3.67 X10(6)UL
RETICS # AUTO: 124.4 X10(3) UL (ref 22.5–147.5)
RETICS/RBC NFR AUTO: 3.4 %
SODIUM SERPL-SCNC: 138 MMOL/L (ref 136–145)
WBC # BLD AUTO: 10.9 X10(3) UL (ref 4–11)

## 2021-01-21 PROCEDURE — 96413 CHEMO IV INFUSION 1 HR: CPT

## 2021-01-21 PROCEDURE — 80053 COMPREHEN METABOLIC PANEL: CPT

## 2021-01-21 PROCEDURE — 85025 COMPLETE CBC W/AUTO DIFF WBC: CPT

## 2021-01-21 PROCEDURE — 36415 COLL VENOUS BLD VENIPUNCTURE: CPT

## 2021-01-21 PROCEDURE — 96415 CHEMO IV INFUSION ADDL HR: CPT

## 2021-01-21 PROCEDURE — 83010 ASSAY OF HAPTOGLOBIN QUANT: CPT

## 2021-01-21 PROCEDURE — 85045 AUTOMATED RETICULOCYTE COUNT: CPT

## 2021-01-21 PROCEDURE — 83615 LACTATE (LD) (LDH) ENZYME: CPT

## 2021-01-21 PROCEDURE — 85397 CLOTTING FUNCT ACTIVITY: CPT

## 2021-01-21 PROCEDURE — 96375 TX/PRO/DX INJ NEW DRUG ADDON: CPT

## 2021-01-21 RX ORDER — ACETAMINOPHEN 325 MG/1
650 TABLET ORAL ONCE
Status: COMPLETED | OUTPATIENT
Start: 2021-01-21 | End: 2021-01-21

## 2021-01-21 RX ORDER — DIPHENHYDRAMINE HCL 25 MG
25 CAPSULE ORAL ONCE
Status: COMPLETED | OUTPATIENT
Start: 2021-01-21 | End: 2021-01-21

## 2021-01-21 RX ORDER — METHYLPREDNISOLONE SODIUM SUCCINATE 40 MG/ML
40 INJECTION, POWDER, LYOPHILIZED, FOR SOLUTION INTRAMUSCULAR; INTRAVENOUS ONCE
Status: COMPLETED | OUTPATIENT
Start: 2021-01-21 | End: 2021-01-21

## 2021-01-21 RX ADMIN — ACETAMINOPHEN 650 MG: 325 TABLET ORAL at 10:48:00

## 2021-01-21 RX ADMIN — METHYLPREDNISOLONE SODIUM SUCCINATE 40 MG: 40 INJECTION, POWDER, LYOPHILIZED, FOR SOLUTION INTRAMUSCULAR; INTRAVENOUS at 10:50:00

## 2021-01-21 RX ADMIN — DIPHENHYDRAMINE HCL 25 MG: 25 MG CAPSULE ORAL at 10:48:00

## 2021-01-21 NOTE — PROGRESS NOTES
Pt tolerated Rituxan infusion well today without incident or complaint. Pt confirmed that she is tapering prednisone as ordered; currently on 10 mg daily.  Will return in 1 week for week 4 and to f/u with MD.     Education Record    Learner:  Patient    Dis

## 2021-01-23 LAB — ADAMTS13 ACTIVITY: 88 %

## 2021-01-28 ENCOUNTER — OFFICE VISIT (OUTPATIENT)
Dept: HEMATOLOGY/ONCOLOGY | Facility: HOSPITAL | Age: 31
End: 2021-01-28
Attending: INTERNAL MEDICINE
Payer: COMMERCIAL

## 2021-01-28 ENCOUNTER — SOCIAL WORK SERVICES (OUTPATIENT)
Dept: HEMATOLOGY/ONCOLOGY | Facility: HOSPITAL | Age: 31
End: 2021-01-28

## 2021-01-28 VITALS
SYSTOLIC BLOOD PRESSURE: 114 MMHG | TEMPERATURE: 99 F | BODY MASS INDEX: 22.35 KG/M2 | HEIGHT: 60.83 IN | WEIGHT: 118.38 LBS | RESPIRATION RATE: 16 BRPM | HEART RATE: 125 BPM | OXYGEN SATURATION: 97 % | DIASTOLIC BLOOD PRESSURE: 75 MMHG

## 2021-01-28 DIAGNOSIS — H80.92 OTOSCLEROSIS OF LEFT EAR: ICD-10-CM

## 2021-01-28 DIAGNOSIS — D59.9 ACQUIRED HEMOLYTIC ANEMIA (HCC): ICD-10-CM

## 2021-01-28 DIAGNOSIS — M31.19 TTP (THROMBOTIC THROMBOCYTOPENIC PURPURA) (HCC): Primary | ICD-10-CM

## 2021-01-28 DIAGNOSIS — D64.9 NORMOCYTIC ANEMIA: ICD-10-CM

## 2021-01-28 DIAGNOSIS — R74.01 TRANSAMINITIS: ICD-10-CM

## 2021-01-28 LAB
ALBUMIN SERPL-MCNC: 3.3 G/DL (ref 3.4–5)
ALBUMIN/GLOB SERPL: 0.9 {RATIO} (ref 1–2)
ALP LIVER SERPL-CCNC: 66 U/L
ALT SERPL-CCNC: 43 U/L
ANION GAP SERPL CALC-SCNC: 3 MMOL/L (ref 0–18)
AST SERPL-CCNC: 15 U/L (ref 15–37)
BASOPHILS # BLD AUTO: 0.03 X10(3) UL (ref 0–0.2)
BASOPHILS NFR BLD AUTO: 0.6 %
BILIRUB SERPL-MCNC: 0.3 MG/DL (ref 0.1–2)
BUN BLD-MCNC: 7 MG/DL (ref 7–18)
BUN/CREAT SERPL: 10 (ref 10–20)
CALCIUM BLD-MCNC: 8.8 MG/DL (ref 8.5–10.1)
CHLORIDE SERPL-SCNC: 112 MMOL/L (ref 98–112)
CO2 SERPL-SCNC: 23 MMOL/L (ref 21–32)
CREAT BLD-MCNC: 0.7 MG/DL
DEPRECATED RDW RBC AUTO: 50.5 FL (ref 35.1–46.3)
EOSINOPHIL # BLD AUTO: 0.07 X10(3) UL (ref 0–0.7)
EOSINOPHIL NFR BLD AUTO: 1.5 %
ERYTHROCYTE [DISTWIDTH] IN BLOOD BY AUTOMATED COUNT: 15.2 % (ref 11–15)
GLOBULIN PLAS-MCNC: 3.5 G/DL (ref 2.8–4.4)
GLUCOSE BLD-MCNC: 142 MG/DL (ref 70–99)
HAPTOGLOB SERPL-MCNC: 76.2 MG/DL (ref 30–200)
HCT VFR BLD AUTO: 34.4 %
HGB BLD-MCNC: 11.7 G/DL
HGB RETIC QN AUTO: 34.9 PG (ref 28.2–36.6)
IMM GRANULOCYTES # BLD AUTO: 0.02 X10(3) UL (ref 0–1)
IMM GRANULOCYTES NFR BLD: 0.4 %
IMM RETICS NFR: 0.02 RATIO (ref 0.1–0.3)
LDH SERPL L TO P-CCNC: 195 U/L
LYMPHOCYTES # BLD AUTO: 0.95 X10(3) UL (ref 1–4)
LYMPHOCYTES NFR BLD AUTO: 19.8 %
M PROTEIN MFR SERPL ELPH: 6.8 G/DL (ref 6.4–8.2)
MCH RBC QN AUTO: 30.5 PG (ref 26–34)
MCHC RBC AUTO-ENTMCNC: 34 G/DL (ref 31–37)
MCV RBC AUTO: 89.8 FL
MONOCYTES # BLD AUTO: 0.47 X10(3) UL (ref 0.1–1)
MONOCYTES NFR BLD AUTO: 9.8 %
NEUTROPHILS # BLD AUTO: 3.27 X10 (3) UL (ref 1.5–7.7)
NEUTROPHILS # BLD AUTO: 3.27 X10(3) UL (ref 1.5–7.7)
NEUTROPHILS NFR BLD AUTO: 67.9 %
OSMOLALITY SERPL CALC.SUM OF ELEC: 286 MOSM/KG (ref 275–295)
PATIENT FASTING Y/N/NP: NO
PLATELET # BLD AUTO: 223 10(3)UL (ref 150–450)
POTASSIUM SERPL-SCNC: 3.8 MMOL/L (ref 3.5–5.1)
RBC # BLD AUTO: 3.83 X10(6)UL
RETICS # AUTO: 64 X10(3) UL (ref 22.5–147.5)
RETICS/RBC NFR AUTO: 1.7 %
SODIUM SERPL-SCNC: 138 MMOL/L (ref 136–145)
WBC # BLD AUTO: 4.8 X10(3) UL (ref 4–11)

## 2021-01-28 PROCEDURE — 96375 TX/PRO/DX INJ NEW DRUG ADDON: CPT

## 2021-01-28 PROCEDURE — 96415 CHEMO IV INFUSION ADDL HR: CPT

## 2021-01-28 PROCEDURE — 96413 CHEMO IV INFUSION 1 HR: CPT

## 2021-01-28 PROCEDURE — 99215 OFFICE O/P EST HI 40 MIN: CPT | Performed by: INTERNAL MEDICINE

## 2021-01-28 RX ORDER — DIPHENHYDRAMINE HCL 25 MG
25 CAPSULE ORAL ONCE
Status: COMPLETED | OUTPATIENT
Start: 2021-01-28 | End: 2021-01-28

## 2021-01-28 RX ORDER — METHYLPREDNISOLONE SODIUM SUCCINATE 40 MG/ML
40 INJECTION, POWDER, LYOPHILIZED, FOR SOLUTION INTRAMUSCULAR; INTRAVENOUS ONCE
Status: COMPLETED | OUTPATIENT
Start: 2021-01-28 | End: 2021-01-28

## 2021-01-28 RX ORDER — ACETAMINOPHEN 325 MG/1
650 TABLET ORAL ONCE
Status: DISCONTINUED | OUTPATIENT
Start: 2021-01-28 | End: 2021-01-28

## 2021-01-28 RX ADMIN — DIPHENHYDRAMINE HCL 25 MG: 25 MG CAPSULE ORAL at 11:42:00

## 2021-01-28 RX ADMIN — METHYLPREDNISOLONE SODIUM SUCCINATE 40 MG: 40 INJECTION, POWDER, LYOPHILIZED, FOR SOLUTION INTRAMUSCULAR; INTRAVENOUS at 11:43:00

## 2021-01-28 NOTE — PROGRESS NOTES
met with Patient and completed Continuous Leave FMLA for 01/01/21-02/17/21, faxing to [employer] at I#[phone] and providing hard copy to patient.

## 2021-01-28 NOTE — PROGRESS NOTES
Hematology Clinic Follow Up Visit    Patient Name: Samuel Lo  Medical Record Number: [id number]   YOB: 1990   PCP: Dr. Soni Andrew    Reason for Consultation:  Samuel Lo was seen today for the diagnosis of TTP denies any aches or pains otherwise. No chest pain, dyspnea, lower extremity swelling or calf pains. No fevers or recent infections. She has some left ear hearing loss.   She saw ENT and audiology, is going to see Dr. Erica Trujillo next week, she repo [phone])  BSA (Calculated - sq m): 1.51 sq meters (01/28 7201)  Pulse: 125 (01/28 0938)  BP: 114/75 (01/28 0938)  Temp: 99.1 °F (37.3 °C) (01/28 0938)  Do Not Use - Resp Rate: --  SpO2: 97 % (01/28 0938)    Wt Readings from Last 6 Encounters:  01/28/21 : 53. 7 BILT 0.3 01/28/2021    TP 6.8 01/28/2021    ALB 3.3 (L) 01/28/2021    GLOBULIN 3.5 01/28/2021     01/28/2021    K 3.8 01/28/2021     01/28/2021    CO2 23.0 01/28/2021     Lab Results   Component Value Date    PTT 37.1 (H) 01/01/2021    INR 1.02 01/02/2021     Lab Results   Component Value Date    KAPLAMRATIO 1.72 (H) 01/02/2021     Lab Results   Component Value Date    TSH 0.610 01/09/2021     Lab Results   Component Value Date    T4F 1.0 01/09/2021     No results found for: T3TOT     Component without any concerns for complications related to TTP at this time though when surgery is scheduled I will reassess to make sure her status has not changed at that time.     RTC in 2 weeks      Eduardo Silver MD  Hematology/Medical Oncology  [facility]

## 2021-01-28 NOTE — PROGRESS NOTES
Education Record     Learner:  Patient     Disease / Diagnosis:TTP     Barriers / Limitations: Mo Sherman                CCANOVSX:     Method: Nancy Crane                ASMRECHC:     General Topics: Gerri Hill of care reviewed                WRMIVMKX:     Outcome:

## 2021-01-29 ENCOUNTER — TELEPHONE (OUTPATIENT)
Dept: HEMATOLOGY/ONCOLOGY | Facility: HOSPITAL | Age: 31
End: 2021-01-29

## 2021-01-29 PROBLEM — H80.92 OTOSCLEROSIS OF LEFT EAR: Status: ACTIVE | Noted: 2021-01-29

## 2021-01-29 LAB — DEPRECATED HBV CORE AB SER IA-ACNC: 45.6 NG/ML

## 2021-01-29 NOTE — TELEPHONE ENCOUNTER
Jeramy called. She has to have a TB skin test done every year for school. She would like to know if it is still okay and safe for her to do so. Please call.

## 2021-01-30 LAB — ADAMTS13 ACTIVITY: 83 %

## 2021-02-11 ENCOUNTER — OFFICE VISIT (OUTPATIENT)
Dept: HEMATOLOGY/ONCOLOGY | Facility: HOSPITAL | Age: 31
End: 2021-02-11
Attending: INTERNAL MEDICINE
Payer: COMMERCIAL

## 2021-02-11 VITALS
BODY MASS INDEX: 23 KG/M2 | OXYGEN SATURATION: 98 % | WEIGHT: 120.5 LBS | RESPIRATION RATE: 18 BRPM | HEART RATE: 101 BPM | SYSTOLIC BLOOD PRESSURE: 105 MMHG | TEMPERATURE: 98 F | DIASTOLIC BLOOD PRESSURE: 68 MMHG

## 2021-02-11 DIAGNOSIS — D64.9 NORMOCYTIC ANEMIA: ICD-10-CM

## 2021-02-11 DIAGNOSIS — M31.19 TTP (THROMBOTIC THROMBOCYTOPENIC PURPURA) (HCC): Primary | ICD-10-CM

## 2021-02-11 DIAGNOSIS — R74.01 TRANSAMINITIS: ICD-10-CM

## 2021-02-11 DIAGNOSIS — D59.9 ACQUIRED HEMOLYTIC ANEMIA (HCC): ICD-10-CM

## 2021-02-11 DIAGNOSIS — H80.92 OTOSCLEROSIS OF LEFT EAR: ICD-10-CM

## 2021-02-11 LAB
ALBUMIN SERPL-MCNC: 3.3 G/DL (ref 3.4–5)
ALBUMIN/GLOB SERPL: 0.9 {RATIO} (ref 1–2)
ALP LIVER SERPL-CCNC: 63 U/L
ALT SERPL-CCNC: 28 U/L
ANION GAP SERPL CALC-SCNC: 6 MMOL/L (ref 0–18)
AST SERPL-CCNC: 13 U/L (ref 15–37)
BASOPHILS # BLD AUTO: 0.02 X10(3) UL (ref 0–0.2)
BASOPHILS NFR BLD AUTO: 0.3 %
BILIRUB SERPL-MCNC: 0.2 MG/DL (ref 0.1–2)
BUN BLD-MCNC: 8 MG/DL (ref 7–18)
BUN/CREAT SERPL: 14.3 (ref 10–20)
CALCIUM BLD-MCNC: 9.4 MG/DL (ref 8.5–10.1)
CHLORIDE SERPL-SCNC: 108 MMOL/L (ref 98–112)
CO2 SERPL-SCNC: 26 MMOL/L (ref 21–32)
CREAT BLD-MCNC: 0.56 MG/DL
DEPRECATED RDW RBC AUTO: 43.1 FL (ref 35.1–46.3)
EOSINOPHIL # BLD AUTO: 0.07 X10(3) UL (ref 0–0.7)
EOSINOPHIL NFR BLD AUTO: 1 %
ERYTHROCYTE [DISTWIDTH] IN BLOOD BY AUTOMATED COUNT: 13.4 % (ref 11–15)
GLOBULIN PLAS-MCNC: 3.5 G/DL (ref 2.8–4.4)
GLUCOSE BLD-MCNC: 93 MG/DL (ref 70–99)
HAPTOGLOB SERPL-MCNC: 69.7 MG/DL (ref 30–200)
HCT VFR BLD AUTO: 34.4 %
HGB BLD-MCNC: 11.6 G/DL
HGB RETIC QN AUTO: 32.9 PG (ref 28.2–36.6)
IMM GRANULOCYTES # BLD AUTO: 0.03 X10(3) UL (ref 0–1)
IMM GRANULOCYTES NFR BLD: 0.4 %
IMM RETICS NFR: 0.06 RATIO (ref 0.1–0.3)
LDH SERPL L TO P-CCNC: 183 U/L
LYMPHOCYTES # BLD AUTO: 1.66 X10(3) UL (ref 1–4)
LYMPHOCYTES NFR BLD AUTO: 23.9 %
M PROTEIN MFR SERPL ELPH: 6.8 G/DL (ref 6.4–8.2)
MCH RBC QN AUTO: 29.7 PG (ref 26–34)
MCHC RBC AUTO-ENTMCNC: 33.7 G/DL (ref 31–37)
MCV RBC AUTO: 88.2 FL
MONOCYTES # BLD AUTO: 0.72 X10(3) UL (ref 0.1–1)
MONOCYTES NFR BLD AUTO: 10.3 %
NEUTROPHILS # BLD AUTO: 4.46 X10 (3) UL (ref 1.5–7.7)
NEUTROPHILS # BLD AUTO: 4.46 X10(3) UL (ref 1.5–7.7)
NEUTROPHILS NFR BLD AUTO: 64.1 %
OSMOLALITY SERPL CALC.SUM OF ELEC: 288 MOSM/KG (ref 275–295)
PATIENT FASTING Y/N/NP: NO
PLATELET # BLD AUTO: 335 10(3)UL (ref 150–450)
POTASSIUM SERPL-SCNC: 3.9 MMOL/L (ref 3.5–5.1)
RBC # BLD AUTO: 3.9 X10(6)UL
RETICS # AUTO: 51.5 X10(3) UL (ref 22.5–147.5)
RETICS/RBC NFR AUTO: 1.3 %
SODIUM SERPL-SCNC: 140 MMOL/L (ref 136–145)
WBC # BLD AUTO: 7 X10(3) UL (ref 4–11)

## 2021-02-11 PROCEDURE — 99214 OFFICE O/P EST MOD 30 MIN: CPT | Performed by: INTERNAL MEDICINE

## 2021-02-11 NOTE — PROGRESS NOTES
Education Record     Learner:  Patient     Disease / Diagnosis:TTP     Barriers / Limitations: Nabila Lopez                RJEQCPIL:     Method: Demetrius Espinoza                JPTDBOUU:     General Topics: Manju Lackey of care reviewed                XROKFGQT:     Outcome:

## 2021-02-11 NOTE — PROGRESS NOTES
Hematology Clinic Follow Up Visit    Patient Name: Keya Deleon  Medical Record Number: [id number]   YOB: 1990   PCP: Dr. Donovan Murphy    Reason for Consultation:  Keya Deleon was seen today for the diagnosis of TTP History:   Diagnosis Date   • Migraines    • TTP (thrombotic thrombocytopenic purpura) ([facility] [address] 1/7/2021     History reviewed. No pertinent surgical history.     Home Medications:    •  sodium chloride 0.9% SOLN 500 mL with riTUXimab-abbs 500 MG/50ML SOLN 560 distress  Psych: Mood and affect are appropriate  Eyes: EOMI  ENT: 1 small hyperpigmented petechial appearing lesion at right soft palate that patient states has been there for years and is unchanged. No other wet purpura or palatal petechia noted.   CV: r 34.9 01/28/2021    RETHE 34.4 01/21/2021    RETHE 38.6 (H) 01/14/2021    RETHE 37.2 (H) 01/07/2021    RETHE 36.3 01/04/2021    RETHE 35.6 01/02/2021     Lab Results   Component Value Date    GUY 45.6 01/28/2021    .6 (H) 01/02/2021     Lab Results Absent     Component      Latest Ref Rng & Units 3/3/0149   HELICOBACTER PYLORI AG FECAL      Negative Negative   HCV AB      Nonreactive  Nonreactive   ROSALES Poly       Negative   HIV Antigen Antibody Combo      Non-Reactive Non-Reactive     Component

## 2021-02-13 LAB — ADAMTS13 ACTIVITY: 80 %

## 2021-03-11 ENCOUNTER — OFFICE VISIT (OUTPATIENT)
Dept: HEMATOLOGY/ONCOLOGY | Facility: HOSPITAL | Age: 31
End: 2021-03-11
Attending: INTERNAL MEDICINE
Payer: COMMERCIAL

## 2021-03-11 VITALS
WEIGHT: 122.81 LBS | SYSTOLIC BLOOD PRESSURE: 104 MMHG | HEIGHT: 60.83 IN | BODY MASS INDEX: 23.19 KG/M2 | RESPIRATION RATE: 16 BRPM | HEART RATE: 90 BPM | DIASTOLIC BLOOD PRESSURE: 67 MMHG | OXYGEN SATURATION: 99 % | TEMPERATURE: 98 F

## 2021-03-11 DIAGNOSIS — R74.01 TRANSAMINITIS: ICD-10-CM

## 2021-03-11 DIAGNOSIS — H80.92 OTOSCLEROSIS OF LEFT EAR: ICD-10-CM

## 2021-03-11 DIAGNOSIS — D59.9 ACQUIRED HEMOLYTIC ANEMIA (HCC): ICD-10-CM

## 2021-03-11 DIAGNOSIS — M31.19 TTP (THROMBOTIC THROMBOCYTOPENIC PURPURA) (HCC): Primary | ICD-10-CM

## 2021-03-11 DIAGNOSIS — D69.6 THROMBOCYTOPENIA (HCC): ICD-10-CM

## 2021-03-11 LAB
ALBUMIN SERPL-MCNC: 3.4 G/DL (ref 3.4–5)
ALBUMIN/GLOB SERPL: 0.9 {RATIO} (ref 1–2)
ALP LIVER SERPL-CCNC: 66 U/L
ALT SERPL-CCNC: 30 U/L
ANION GAP SERPL CALC-SCNC: 3 MMOL/L (ref 0–18)
AST SERPL-CCNC: 13 U/L (ref 15–37)
BASOPHILS # BLD AUTO: 0.04 X10(3) UL (ref 0–0.2)
BASOPHILS NFR BLD AUTO: 0.5 %
BILIRUB SERPL-MCNC: 0.2 MG/DL (ref 0.1–2)
BUN BLD-MCNC: 9 MG/DL (ref 7–18)
BUN/CREAT SERPL: 15.5 (ref 10–20)
CALCIUM BLD-MCNC: 9 MG/DL (ref 8.5–10.1)
CHLORIDE SERPL-SCNC: 108 MMOL/L (ref 98–112)
CO2 SERPL-SCNC: 28 MMOL/L (ref 21–32)
CREAT BLD-MCNC: 0.58 MG/DL
DEPRECATED RDW RBC AUTO: 35.9 FL (ref 35.1–46.3)
EOSINOPHIL # BLD AUTO: 0.11 X10(3) UL (ref 0–0.7)
EOSINOPHIL NFR BLD AUTO: 1.5 %
ERYTHROCYTE [DISTWIDTH] IN BLOOD BY AUTOMATED COUNT: 11.8 % (ref 11–15)
GLOBULIN PLAS-MCNC: 3.7 G/DL (ref 2.8–4.4)
GLUCOSE BLD-MCNC: 90 MG/DL (ref 70–99)
HAPTOGLOB SERPL-MCNC: 72.6 MG/DL (ref 30–200)
HCT VFR BLD AUTO: 38.5 %
HGB BLD-MCNC: 13.1 G/DL
HGB RETIC QN AUTO: 32.9 PG (ref 28.2–36.6)
IMM GRANULOCYTES # BLD AUTO: 0.01 X10(3) UL (ref 0–1)
IMM GRANULOCYTES NFR BLD: 0.1 %
IMM RETICS NFR: 0.03 RATIO (ref 0.1–0.3)
LDH SERPL L TO P-CCNC: 153 U/L
LYMPHOCYTES # BLD AUTO: 1.68 X10(3) UL (ref 1–4)
LYMPHOCYTES NFR BLD AUTO: 22.4 %
M PROTEIN MFR SERPL ELPH: 7.1 G/DL (ref 6.4–8.2)
MCH RBC QN AUTO: 28.8 PG (ref 26–34)
MCHC RBC AUTO-ENTMCNC: 34 G/DL (ref 31–37)
MCV RBC AUTO: 84.6 FL
MONOCYTES # BLD AUTO: 0.69 X10(3) UL (ref 0.1–1)
MONOCYTES NFR BLD AUTO: 9.2 %
NEUTROPHILS # BLD AUTO: 4.98 X10 (3) UL (ref 1.5–7.7)
NEUTROPHILS # BLD AUTO: 4.98 X10(3) UL (ref 1.5–7.7)
NEUTROPHILS NFR BLD AUTO: 66.3 %
OSMOLALITY SERPL CALC.SUM OF ELEC: 286 MOSM/KG (ref 275–295)
PATIENT FASTING Y/N/NP: NO
PLATELET # BLD AUTO: 306 10(3)UL (ref 150–450)
POTASSIUM SERPL-SCNC: 4 MMOL/L (ref 3.5–5.1)
RBC # BLD AUTO: 4.55 X10(6)UL
RETICS # AUTO: 36.4 X10(3) UL (ref 22.5–147.5)
RETICS/RBC NFR AUTO: 0.8 %
SODIUM SERPL-SCNC: 139 MMOL/L (ref 136–145)
WBC # BLD AUTO: 7.5 X10(3) UL (ref 4–11)

## 2021-03-11 PROCEDURE — 99214 OFFICE O/P EST MOD 30 MIN: CPT | Performed by: INTERNAL MEDICINE

## 2021-03-11 NOTE — PROGRESS NOTES
Hematology Clinic Follow Up Visit    Patient Name: Anum Gibson  Medical Record Number: [id number]   YOB: 1990   PCP: Dr. Josiane Parikh    Reason for Consultation:  Anum Gibson was seen today for the diagnosis of TTP History:  Past Medical History:   Diagnosis Date   • Migraines    • TTP (thrombotic thrombocytopenic purpura) ([facility] [address] 1/7/2021     History reviewed. No pertinent surgical history.     Home Medications:  sodium chloride 0.9% SOLN 500 mL with riTUXimab-abbs 500 oz)  01/11/21 : 51.5 kg (113 lb 8 oz)    Physical Examination:  General: Patient is alert and oriented, no distress  Psych: Mood and affect are appropriate  Eyes: EOMI  ENT: 1 small hyperpigmented petechial appearing lesion at right soft palate that patien REITCPERCENT 3.7 (H) 01/04/2021    REITCPERCENT 1.7 01/02/2021     Lab Results   Component Value Date    RETHE 32.9 03/11/2021    RETHE 32.9 02/11/2021    RETHE 34.9 01/28/2021    RETHE 34.4 01/21/2021    RETHE 38.6 (H) 01/14/2021    RETHE 37.2 (H) 01/07/2 1/3/2021   % PNH RBC      0.000 - 0.004 % 0.002   % PNH MONOCYTES      0.000 - 0.019 % 0.000   % PNH PMN      0.000 - 0.004 % 0.000   [id number] ACTIVITY      >=61 % <5 (L)   HEMOSIDERIN, URINE      Absent Absent     Component      Latest Ref Rng & Units 1/2 summer.     RTC in 4 weeks      Bobby Solano MD  Hematology/Medical [address] [place] [address]

## 2021-03-11 NOTE — PROGRESS NOTES
Education Record     Learner:  Patient     Disease / Diagnosis:TTP     Barriers / Limitations: Tato Lo                HQCDGUJM:     Method: Dominik Morales                YKFIJQTA:     General Topics: Zen Vargas of care reviewed                IWIMLNOK:     Outcome:

## 2021-03-13 LAB — ADAMTS13 ACTIVITY: 75 %

## 2021-04-08 ENCOUNTER — OFFICE VISIT (OUTPATIENT)
Dept: HEMATOLOGY/ONCOLOGY | Facility: HOSPITAL | Age: 31
End: 2021-04-08
Attending: INTERNAL MEDICINE
Payer: COMMERCIAL

## 2021-04-08 VITALS
OXYGEN SATURATION: 97 % | SYSTOLIC BLOOD PRESSURE: 104 MMHG | TEMPERATURE: 99 F | WEIGHT: 122 LBS | HEART RATE: 88 BPM | RESPIRATION RATE: 16 BRPM | HEIGHT: 60.83 IN | BODY MASS INDEX: 23.03 KG/M2 | DIASTOLIC BLOOD PRESSURE: 73 MMHG

## 2021-04-08 DIAGNOSIS — D59.9 ACQUIRED HEMOLYTIC ANEMIA (HCC): ICD-10-CM

## 2021-04-08 DIAGNOSIS — R74.01 TRANSAMINITIS: ICD-10-CM

## 2021-04-08 DIAGNOSIS — M31.19 TTP (THROMBOTIC THROMBOCYTOPENIC PURPURA) (HCC): Primary | ICD-10-CM

## 2021-04-08 DIAGNOSIS — L65.9 HAIR LOSS: ICD-10-CM

## 2021-04-08 PROCEDURE — 99214 OFFICE O/P EST MOD 30 MIN: CPT | Performed by: INTERNAL MEDICINE

## 2021-04-08 NOTE — PROGRESS NOTES
Education Record     Learner:  Patient     Disease / Diagnosis:TTP     Barriers / Limitations: Nabila Lopez                TBVBVZXD:     Method: Demetrius LOERA:     General Topics: Manju Lackey of care reviewed                XPPVPVNE:     Outcome:

## 2021-04-08 NOTE — PROGRESS NOTES
Hematology Clinic Follow Up Visit    Patient Name: Maria C Ku  Medical Record Number: [id number]   YOB: 1990   PCP: Dr. 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 Her energy is doing fine. No dyspnea, chest pain, confusion, or mental status changes. No abnormal bleeding. No lower extremity swelling or calf pains. No fevers or recent infections.       She is considering possible dobbins 88 (04/08 1142)  BP: 104/73 (04/08 1142)  Temp: 98.6 °F (37 °C) (04/08 1142)  Do Not Use - Resp Rate: --  SpO2: 97 % (04/08 1142)    Wt Readings from Last 6 Encounters:  04/08/21 : 55.3 kg (122 lb)  03/11/21 : 55.7 kg (122 lb 12.8 oz)  02/11/21 : 54.7 kg ( 3.8 04/08/2021     04/08/2021    CO2 27.0 04/08/2021     Lab Results   Component Value Date    PTT 37.1 (H) 01/01/2021    INR 1.02 01/01/2021     Lab Results   Component Value Date    REITCPERCENT 0.7 04/08/2021    REITCPERCENT 0.8 03/11/2021    REIT (L) 01/07/2021    HAPT <7.8 (L) 01/02/2021     No results found for: [facility] [place]  Lab Results   Component Value Date    [facility] 3.110 (H) 01/02/2021      Lab Results   Component Value Date    [facility] 1.813 01/02/2021     Lab Results   Component Value Da above    *left ear hearing loss d/t otosclerosis  -following with ENT, Dr. Kamilah Hong. She will go for a CT scan to evaluate further, but is suspected to have otosclerosis and may need a stapedectomy.   I advised Maddy that she can undergo surgery with

## 2021-04-13 ENCOUNTER — TELEPHONE (OUTPATIENT)
Dept: HEMATOLOGY/ONCOLOGY | Facility: HOSPITAL | Age: 31
End: 2021-04-13

## 2021-04-13 NOTE — TELEPHONE ENCOUNTER
Patient called and they need a copy of the medical leave letter.  She said it was okay if it is put into there Aime

## 2021-04-28 ENCOUNTER — TELEPHONE (OUTPATIENT)
Dept: HEMATOLOGY/ONCOLOGY | Facility: HOSPITAL | Age: 31
End: 2021-04-28

## 2021-04-28 NOTE — TELEPHONE ENCOUNTER
Patient calling says that the message that Dr Jensen Cazares replied to for patients note is fine for her.  Thank you eric

## 2021-04-29 ENCOUNTER — DOCUMENTATION ONLY (OUTPATIENT)
Dept: HEMATOLOGY/ONCOLOGY | Facility: HOSPITAL | Age: 31
End: 2021-04-29

## 2021-05-20 ENCOUNTER — HOSPITAL ENCOUNTER (OUTPATIENT)
Dept: CT IMAGING | Facility: HOSPITAL | Age: 31
Discharge: HOME | End: 2021-05-20
Attending: OTOLARYNGOLOGY
Payer: COMMERCIAL

## 2021-05-20 DIAGNOSIS — H90.0 CONDUCTIVE HEARING LOSS, BILATERAL: ICD-10-CM

## 2021-05-20 PROCEDURE — 70480 CT ORBIT/EAR/FOSSA W/O DYE: CPT | Performed by: OTOLARYNGOLOGY

## 2021-05-20 NOTE — PROGRESS NOTES
Hu Deshpande. Your CT is most consistent with otosclerosis as the cause of your hearing loss. This is a condition where the stapes hearing bone gets fixed in place when the bone hardens. The stapedectomy surgery we discussed can treat this.  If you would l

## 2021-07-08 ENCOUNTER — OFFICE VISIT (OUTPATIENT)
Dept: HEMATOLOGY/ONCOLOGY | Facility: HOSPITAL | Age: 31
End: 2021-07-08
Attending: INTERNAL MEDICINE
Payer: COMMERCIAL

## 2021-07-08 VITALS
RESPIRATION RATE: 16 BRPM | BODY MASS INDEX: 23.22 KG/M2 | DIASTOLIC BLOOD PRESSURE: 67 MMHG | HEIGHT: 60.83 IN | WEIGHT: 123 LBS | TEMPERATURE: 98 F | HEART RATE: 91 BPM | SYSTOLIC BLOOD PRESSURE: 102 MMHG | OXYGEN SATURATION: 99 %

## 2021-07-08 DIAGNOSIS — D69.6 THROMBOCYTOPENIA (HCC): ICD-10-CM

## 2021-07-08 DIAGNOSIS — D59.9 ACQUIRED HEMOLYTIC ANEMIA (HCC): Primary | ICD-10-CM

## 2021-07-08 DIAGNOSIS — R20.2 PARESTHESIAS: ICD-10-CM

## 2021-07-08 DIAGNOSIS — M31.19 TTP (THROMBOTIC THROMBOCYTOPENIC PURPURA) (HCC): ICD-10-CM

## 2021-07-08 LAB
ALBUMIN SERPL-MCNC: 3.7 G/DL (ref 3.4–5)
ALBUMIN/GLOB SERPL: 1.1 {RATIO} (ref 1–2)
ALP LIVER SERPL-CCNC: 58 U/L
ALT SERPL-CCNC: 28 U/L
ANION GAP SERPL CALC-SCNC: 6 MMOL/L (ref 0–18)
AST SERPL-CCNC: 16 U/L (ref 15–37)
BASOPHILS # BLD AUTO: 0.03 X10(3) UL (ref 0–0.2)
BASOPHILS NFR BLD AUTO: 0.4 %
BILIRUB SERPL-MCNC: 0.4 MG/DL (ref 0.1–2)
BUN BLD-MCNC: 11 MG/DL (ref 7–18)
BUN/CREAT SERPL: 16.7 (ref 10–20)
CALCIUM BLD-MCNC: 8.7 MG/DL (ref 8.5–10.1)
CHLORIDE SERPL-SCNC: 111 MMOL/L (ref 98–112)
CO2 SERPL-SCNC: 22 MMOL/L (ref 21–32)
CREAT BLD-MCNC: 0.66 MG/DL
DEPRECATED RDW RBC AUTO: 39.8 FL (ref 35.1–46.3)
EOSINOPHIL # BLD AUTO: 0.08 X10(3) UL (ref 0–0.7)
EOSINOPHIL NFR BLD AUTO: 1.1 %
ERYTHROCYTE [DISTWIDTH] IN BLOOD BY AUTOMATED COUNT: 13.3 % (ref 11–15)
GLOBULIN PLAS-MCNC: 3.3 G/DL (ref 2.8–4.4)
GLUCOSE BLD-MCNC: 93 MG/DL (ref 70–99)
HAPTOGLOB SERPL-MCNC: 76.8 MG/DL (ref 30–200)
HCT VFR BLD AUTO: 40.3 %
HGB BLD-MCNC: 13.6 G/DL
HGB RETIC QN AUTO: 33.2 PG (ref 28.2–36.6)
IMM GRANULOCYTES # BLD AUTO: 0.01 X10(3) UL (ref 0–1)
IMM GRANULOCYTES NFR BLD: 0.1 %
IMM RETICS NFR: 0.01 RATIO (ref 0.1–0.3)
LDH SERPL L TO P-CCNC: 164 U/L
LYMPHOCYTES # BLD AUTO: 1.87 X10(3) UL (ref 1–4)
LYMPHOCYTES NFR BLD AUTO: 25.4 %
M PROTEIN MFR SERPL ELPH: 7 G/DL (ref 6.4–8.2)
MCH RBC QN AUTO: 27.8 PG (ref 26–34)
MCHC RBC AUTO-ENTMCNC: 33.7 G/DL (ref 31–37)
MCV RBC AUTO: 82.4 FL
MONOCYTES # BLD AUTO: 0.78 X10(3) UL (ref 0.1–1)
MONOCYTES NFR BLD AUTO: 10.6 %
NEUTROPHILS # BLD AUTO: 4.58 X10 (3) UL (ref 1.5–7.7)
NEUTROPHILS # BLD AUTO: 4.58 X10(3) UL (ref 1.5–7.7)
NEUTROPHILS NFR BLD AUTO: 62.4 %
OSMOLALITY SERPL CALC.SUM OF ELEC: 287 MOSM/KG (ref 275–295)
PLATELET # BLD AUTO: 273 10(3)UL (ref 150–450)
POTASSIUM SERPL-SCNC: 4 MMOL/L (ref 3.5–5.1)
RBC # BLD AUTO: 4.89 X10(6)UL
RETICS # AUTO: 32.8 X10(3) UL (ref 22.5–147.5)
RETICS/RBC NFR AUTO: 0.7 %
SODIUM SERPL-SCNC: 139 MMOL/L (ref 136–145)
WBC # BLD AUTO: 7.4 X10(3) UL (ref 4–11)

## 2021-07-08 PROCEDURE — 99215 OFFICE O/P EST HI 40 MIN: CPT | Performed by: INTERNAL MEDICINE

## 2021-07-08 NOTE — PROGRESS NOTES
Hematology Clinic Follow Up Visit    Patient Name: Leyla Santos  Medical Record Number: [id number]   YOB: 1990   PCP: Dr. Abe Lewis    Reason for Consultation:  Leyla Santos was seen today for the diagnosis of TTP changes. Her hair is no longer falling out after changing her shampoo. 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 Her energy is doing fine.   No dyspnea, chest pain, confusion, or mental sta (07/08 1013)  Do Not Use - Resp Rate: --  SpO2: 99 % (07/08 1013)    Wt Readings from Last 6 Encounters:  07/08/21 : 55.8 kg (123 lb)  04/08/21 : 55.3 kg (122 lb)  03/11/21 : 55.7 kg (122 lb 12.8 oz)  02/11/21 : 54.7 kg (120 lb 8 oz)  01/28/21 : 53.7 kg (1 01/01/2021     Lab Results   Component Value Date    REITCPERCENT 0.7 07/08/2021    REITCPERCENT 0.7 04/08/2021    REITCPERCENT 0.8 03/11/2021    REITCPERCENT 1.3 02/11/2021    REITCPERCENT 1.7 01/28/2021    REITCPERCENT 3.4 (H) 01/21/2021    REITCPERCENT 01/02/2021     No results found for: [facility] [place]  Lab Results   Component Value Date    ARTUROCESAR 3.110 (H) 01/02/2021      Lab Results   Component Value Date    [facility] 1.813 01/02/2021     Lab Results   Component Value Date    CAL 1.72 (H) 01/02 this.    *Anemia, due to hemolysis from TTP  -Improved. Continue folic acid supplementation at this time. -Repeat CBC, LDH, hapto, reticulocyte count in 3 months    *Transaminitis  -Likely due to TTP and steroid use, has now normalized.    -Repeat CMP in

## 2021-07-08 NOTE — PROGRESS NOTES
Education Record     Learner:  Patient     Disease / Diagnosis:TTP     Barriers / Limitations: Natalia Valdes                GDOGEJXL:     Method: Kathy Montanez                HEKDFCQH:     General Topics: Sheila Barron of care reviewed                FJFJVOOZ:     Outcome:

## 2021-07-10 LAB — ADAMTS13 ACTIVITY: 62 %

## 2021-09-30 ENCOUNTER — OFFICE VISIT (OUTPATIENT)
Dept: HEMATOLOGY/ONCOLOGY | Facility: HOSPITAL | Age: 31
End: 2021-09-30
Attending: INTERNAL MEDICINE
Payer: COMMERCIAL

## 2021-09-30 VITALS
WEIGHT: 124 LBS | TEMPERATURE: 98 F | HEIGHT: 60.83 IN | OXYGEN SATURATION: 98 % | DIASTOLIC BLOOD PRESSURE: 59 MMHG | HEART RATE: 93 BPM | BODY MASS INDEX: 23.41 KG/M2 | RESPIRATION RATE: 16 BRPM | SYSTOLIC BLOOD PRESSURE: 96 MMHG

## 2021-09-30 DIAGNOSIS — Z78.9: ICD-10-CM

## 2021-09-30 DIAGNOSIS — D59.9 ACQUIRED HEMOLYTIC ANEMIA (HCC): ICD-10-CM

## 2021-09-30 DIAGNOSIS — Z31.69 PRE-CONCEPTION COUNSELING: ICD-10-CM

## 2021-09-30 DIAGNOSIS — H80.92 OTOSCLEROSIS OF LEFT EAR: ICD-10-CM

## 2021-09-30 DIAGNOSIS — M31.19 TTP (THROMBOTIC THROMBOCYTOPENIC PURPURA) (HCC): Primary | ICD-10-CM

## 2021-09-30 DIAGNOSIS — R74.01 TRANSAMINITIS: ICD-10-CM

## 2021-09-30 LAB
ALBUMIN SERPL-MCNC: 3.2 G/DL (ref 3.4–5)
ALBUMIN/GLOB SERPL: 0.9 {RATIO} (ref 1–2)
ALP LIVER SERPL-CCNC: 60 U/L
ALT SERPL-CCNC: 37 U/L
ANION GAP SERPL CALC-SCNC: 5 MMOL/L (ref 0–18)
AST SERPL-CCNC: 21 U/L (ref 15–37)
BASOPHILS # BLD AUTO: 0.04 X10(3) UL (ref 0–0.2)
BASOPHILS NFR BLD AUTO: 0.5 %
BILIRUB SERPL-MCNC: 0.3 MG/DL (ref 0.1–2)
BUN BLD-MCNC: 8 MG/DL (ref 7–18)
CALCIUM BLD-MCNC: 8.8 MG/DL (ref 8.5–10.1)
CHLORIDE SERPL-SCNC: 110 MMOL/L (ref 98–112)
CO2 SERPL-SCNC: 25 MMOL/L (ref 21–32)
CREAT BLD-MCNC: 0.62 MG/DL
EOSINOPHIL # BLD AUTO: 0.09 X10(3) UL (ref 0–0.7)
EOSINOPHIL NFR BLD AUTO: 1.2 %
ERYTHROCYTE [DISTWIDTH] IN BLOOD BY AUTOMATED COUNT: 12.4 %
GLOBULIN PLAS-MCNC: 3.7 G/DL (ref 2.8–4.4)
GLUCOSE BLD-MCNC: 119 MG/DL (ref 70–99)
HAPTOGLOB SERPL-MCNC: 91.3 MG/DL (ref 30–200)
HCT VFR BLD AUTO: 38.3 %
HGB BLD-MCNC: 13.2 G/DL
HGB RETIC QN AUTO: 32.8 PG (ref 28.2–36.6)
IMM GRANULOCYTES # BLD AUTO: 0.01 X10(3) UL (ref 0–1)
IMM GRANULOCYTES NFR BLD: 0.1 %
IMM RETICS NFR: 0.05 RATIO (ref 0.1–0.3)
LDH SERPL L TO P-CCNC: 145 U/L
LYMPHOCYTES # BLD AUTO: 2.01 X10(3) UL (ref 1–4)
LYMPHOCYTES NFR BLD AUTO: 27.3 %
MCH RBC QN AUTO: 28.4 PG (ref 26–34)
MCHC RBC AUTO-ENTMCNC: 34.5 G/DL (ref 31–37)
MCV RBC AUTO: 82.4 FL
MONOCYTES # BLD AUTO: 0.63 X10(3) UL (ref 0.1–1)
MONOCYTES NFR BLD AUTO: 8.6 %
NEUTROPHILS # BLD AUTO: 4.57 X10 (3) UL (ref 1.5–7.7)
NEUTROPHILS # BLD AUTO: 4.57 X10(3) UL (ref 1.5–7.7)
NEUTROPHILS NFR BLD AUTO: 62.3 %
OSMOLALITY SERPL CALC.SUM OF ELEC: 289 MOSM/KG (ref 275–295)
PLATELET # BLD AUTO: 273 10(3)UL (ref 150–450)
POTASSIUM SERPL-SCNC: 3.9 MMOL/L (ref 3.5–5.1)
PROT SERPL-MCNC: 6.9 G/DL (ref 6.4–8.2)
RBC # BLD AUTO: 4.65 X10(6)UL
RETICS # AUTO: 38.6 X10(3) UL (ref 22.5–147.5)
RETICS/RBC NFR AUTO: 0.8 %
SARS-COV-2 IGG+IGM SERPL QL IA: REACTIVE
SODIUM SERPL-SCNC: 140 MMOL/L (ref 136–145)
WBC # BLD AUTO: 7.4 X10(3) UL (ref 4–11)

## 2021-09-30 PROCEDURE — 99215 OFFICE O/P EST HI 40 MIN: CPT | Performed by: INTERNAL MEDICINE

## 2021-09-30 RX ORDER — ERGOCALCIFEROL (VITAMIN D2) 10 MCG
1 TABLET ORAL DAILY
COMMUNITY

## 2021-09-30 NOTE — PROGRESS NOTES
Hematology Clinic Follow Up Visit    Patient Name: Saintclair Cullen  Medical Record Number: [id number]   YOB: 1990   PCP: Dr. Aracely Dawn    Reason for Consultation:  Saintclair Cullen was seen today for the diagnosis of TTP changes. No abnormal bleeding. No lower extremity swelling or calf pains. No fevers or recent infections. She received both Pfizer COVID-19 vaccinations, second dose on 9/10/2021.   She had some mild short-lived body aches and transient fever followin (09/30 1054)  Do Not Use - Resp Rate: --  SpO2: 98 % (09/30 1054)    Wt Readings from Last 6 Encounters:  09/30/21 : 56.2 kg (124 lb)  07/08/21 : 55.8 kg (123 lb)  04/08/21 : 55.3 kg (122 lb)  03/11/21 : 55.7 kg (122 lb 12.8 oz)  02/11/21 : 54.7 kg (120 lb 01/01/2021     Lab Results   Component Value Date    REITCPERCENT 0.8 09/30/2021    REITCPERCENT 0.7 07/08/2021    REITCPERCENT 0.7 04/08/2021    REITCPERCENT 0.8 03/11/2021    REITCPERCENT 1.3 02/11/2021    REITCPERCENT 1.7 01/28/2021    REITCPERCENT 3.4 01/28/2021    HAPT 58.4 01/21/2021    HAPT 79.3 01/14/2021    HAPT <7.8 (L) 01/07/2021    HAPT <7.8 (L) 01/02/2021     No results found for: [facility] [place]  Lab Results   Component Value Date    KAPPALTCHN 3.110 (H) 01/02/2021      Lab Results   Component Value 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 We discussed this can be done, though would make her pregnancy high risk for fetal and/or maternal complications. Would not be encouraged if her TVLOKR28 level falls.   Would monitor BBSJVD47

## 2021-09-30 NOTE — PROGRESS NOTES
Education Record     Learner:  Patient     Disease / Diagnosis:TTP     Barriers / Limitations: Gricel Flores                WPSKXLGS:     Method: Steff Awad                TCJJFNNS:     General Topics: Mary Sullivan of [place] reviewed                GHJDKFQU:     Outcome:

## 2021-12-22 ENCOUNTER — OFFICE VISIT (OUTPATIENT)
Dept: HEMATOLOGY/ONCOLOGY | Facility: HOSPITAL | Age: 31
End: 2021-12-22
Attending: INTERNAL MEDICINE
Payer: COMMERCIAL

## 2021-12-22 VITALS
OXYGEN SATURATION: 98 % | BODY MASS INDEX: 23.6 KG/M2 | WEIGHT: 125 LBS | TEMPERATURE: 99 F | HEART RATE: 81 BPM | RESPIRATION RATE: 16 BRPM | DIASTOLIC BLOOD PRESSURE: 71 MMHG | SYSTOLIC BLOOD PRESSURE: 108 MMHG | HEIGHT: 60.83 IN

## 2021-12-22 DIAGNOSIS — D59.9 ACQUIRED HEMOLYTIC ANEMIA (HCC): ICD-10-CM

## 2021-12-22 DIAGNOSIS — Z31.69 PRE-CONCEPTION COUNSELING: ICD-10-CM

## 2021-12-22 DIAGNOSIS — Z78.9: ICD-10-CM

## 2021-12-22 DIAGNOSIS — H80.92 OTOSCLEROSIS OF LEFT EAR: ICD-10-CM

## 2021-12-22 DIAGNOSIS — M31.19 TTP (THROMBOTIC THROMBOCYTOPENIC PURPURA) (HCC): Primary | ICD-10-CM

## 2021-12-22 PROCEDURE — 99214 OFFICE O/P EST MOD 30 MIN: CPT | Performed by: INTERNAL MEDICINE

## 2021-12-22 NOTE — PROGRESS NOTES
Hematology Clinic Follow Up Visit    Patient Name: Rita Storey  Medical Record Number: [id number]   YOB: 1990   PCP: Dr. Sebastian Lechuga    Reason for Consultation:  Rita Storey was seen today for the diagnosis of TTP bleeding. No lower extremity swelling or calf pains. No fevers or recent infections. She received second dose of Pfizer COVID-19 vaccine on 9/10/2021.   She is scheduling a third dose booster for next week     She continues to work and go to school for - Resp Rate: --  SpO2: 98 % (12/22 1113)    Wt Readings from Last 6 Encounters:  12/22/21 : 56.7 kg (125 lb)  09/30/21 : 56.2 kg (124 lb)  07/08/21 : 55.8 kg (123 lb)  04/08/21 : 55.3 kg (122 lb)  03/11/21 : 55.7 kg (122 lb 12.8 oz)  02/11/21 : 54.7 kg (12 Component Value Date    REITCPERCENT 0.9 12/22/2021    REITCPERCENT 0.8 09/30/2021    REITCPERCENT 0.7 07/08/2021    REITCPERCENT 0.7 04/08/2021    REITCPERCENT 0.8 03/11/2021    REITCPERCENT 1.3 02/11/2021    REITCPERCENT 1.7 01/28/2021    REITCPERCENT 04/08/2021    HAPT 72.6 03/11/2021    HAPT 69.7 02/11/2021    HAPT 76.2 01/28/2021    HAPT 58.4 01/21/2021    HAPT 79.3 01/14/2021    HAPT <7.8 (L) 01/07/2021    HAPT <7.8 (L) 01/02/2021     No results found for: [facility] [place]  Lab Results   Component Value Jaquan evidence of recurrence at this time.  -Will continue surveillance monitoring as below:   -CBC w/ diff, CMP, LDH, haptoglobin, reticulocyte count, and LZBKEP06 activity q3 mths, if remains without any evidence of recurrence after 2 years will consider exten time.    RTC in 3 months     Ria Bateman MD  Hematology/Medical [address] [place] [address]

## 2021-12-22 NOTE — PROGRESS NOTES
Education Record     Learner:  Patient     Disease / Diagnosis:TTP     Barriers / Limitations: Mel Sender                YILPWQVE:     Method: Vania Faustin                VJHPWJTS:     General Topics: Lacie Phalen of care reviewed                ATYRIETB:     Outcome:

## 2022-03-23 ENCOUNTER — OFFICE VISIT (OUTPATIENT)
Dept: HEMATOLOGY/ONCOLOGY | Facility: HOSPITAL | Age: 32
End: 2022-03-23
Attending: INTERNAL MEDICINE
Payer: COMMERCIAL

## 2022-03-23 VITALS
DIASTOLIC BLOOD PRESSURE: 69 MMHG | RESPIRATION RATE: 16 BRPM | TEMPERATURE: 99 F | WEIGHT: 124.81 LBS | HEART RATE: 107 BPM | BODY MASS INDEX: 23.56 KG/M2 | OXYGEN SATURATION: 97 % | HEIGHT: 60.83 IN | SYSTOLIC BLOOD PRESSURE: 107 MMHG

## 2022-03-23 DIAGNOSIS — Z31.69 PRE-CONCEPTION COUNSELING: ICD-10-CM

## 2022-03-23 DIAGNOSIS — M31.19 TTP (THROMBOTIC THROMBOCYTOPENIC PURPURA) (HCC): Primary | ICD-10-CM

## 2022-03-23 DIAGNOSIS — D59.9 ACQUIRED HEMOLYTIC ANEMIA (HCC): ICD-10-CM

## 2022-03-23 LAB
ALBUMIN SERPL-MCNC: 3.5 G/DL (ref 3.4–5)
ALBUMIN/GLOB SERPL: 0.9 {RATIO} (ref 1–2)
ALP LIVER SERPL-CCNC: 55 U/L
ALT SERPL-CCNC: 33 U/L
ANION GAP SERPL CALC-SCNC: 4 MMOL/L (ref 0–18)
AST SERPL-CCNC: 19 U/L (ref 15–37)
BASOPHILS # BLD AUTO: 0.04 X10(3) UL (ref 0–0.2)
BASOPHILS NFR BLD AUTO: 0.6 %
BILIRUB SERPL-MCNC: 0.3 MG/DL (ref 0.1–2)
BUN BLD-MCNC: 8 MG/DL (ref 7–18)
CALCIUM BLD-MCNC: 9 MG/DL (ref 8.5–10.1)
CHLORIDE SERPL-SCNC: 110 MMOL/L (ref 98–112)
CO2 SERPL-SCNC: 23 MMOL/L (ref 21–32)
CREAT BLD-MCNC: 0.76 MG/DL
EOSINOPHIL # BLD AUTO: 0.08 X10(3) UL (ref 0–0.7)
EOSINOPHIL NFR BLD AUTO: 1.3 %
ERYTHROCYTE [DISTWIDTH] IN BLOOD BY AUTOMATED COUNT: 12.9 %
FASTING STATUS PATIENT QL REPORTED: NO
GLOBULIN PLAS-MCNC: 3.8 G/DL (ref 2.8–4.4)
GLUCOSE BLD-MCNC: 115 MG/DL (ref 70–99)
HAPTOGLOB SERPL-MCNC: 86.3 MG/DL (ref 30–200)
HCT VFR BLD AUTO: 40.9 %
HGB BLD-MCNC: 13.7 G/DL
HGB RETIC QN AUTO: 32.3 PG (ref 28.2–36.6)
IMM GRANULOCYTES # BLD AUTO: 0.02 X10(3) UL (ref 0–1)
IMM GRANULOCYTES NFR BLD: 0.3 %
IMM RETICS NFR: 0.03 RATIO (ref 0.1–0.3)
LDH SERPL L TO P-CCNC: 152 U/L
LYMPHOCYTES # BLD AUTO: 1.87 X10(3) UL (ref 1–4)
LYMPHOCYTES NFR BLD AUTO: 29.3 %
MCH RBC QN AUTO: 28.4 PG (ref 26–34)
MCHC RBC AUTO-ENTMCNC: 33.5 G/DL (ref 31–37)
MCV RBC AUTO: 84.7 FL
MONOCYTES # BLD AUTO: 0.5 X10(3) UL (ref 0.1–1)
MONOCYTES NFR BLD AUTO: 7.8 %
NEUTROPHILS # BLD AUTO: 3.87 X10 (3) UL (ref 1.5–7.7)
NEUTROPHILS # BLD AUTO: 3.87 X10(3) UL (ref 1.5–7.7)
NEUTROPHILS NFR BLD AUTO: 60.7 %
OSMOLALITY SERPL CALC.SUM OF ELEC: 283 MOSM/KG (ref 275–295)
PLATELET # BLD AUTO: 255 10(3)UL (ref 150–450)
POTASSIUM SERPL-SCNC: 3.8 MMOL/L (ref 3.5–5.1)
PROT SERPL-MCNC: 7.3 G/DL (ref 6.4–8.2)
RBC # BLD AUTO: 4.83 X10(6)UL
RETICS/RBC NFR AUTO: 1 %
SODIUM SERPL-SCNC: 137 MMOL/L (ref 136–145)
WBC # BLD AUTO: 6.4 X10(3) UL (ref 4–11)

## 2022-03-23 PROCEDURE — 99214 OFFICE O/P EST MOD 30 MIN: CPT | Performed by: INTERNAL MEDICINE

## 2022-03-26 LAB — ADAMTS13 ACTIVITY: 83 %

## 2022-06-22 ENCOUNTER — OFFICE VISIT (OUTPATIENT)
Dept: HEMATOLOGY/ONCOLOGY | Facility: HOSPITAL | Age: 32
End: 2022-06-22
Attending: INTERNAL MEDICINE
Payer: COMMERCIAL

## 2022-06-22 VITALS
RESPIRATION RATE: 18 BRPM | DIASTOLIC BLOOD PRESSURE: 67 MMHG | BODY MASS INDEX: 24 KG/M2 | TEMPERATURE: 98 F | HEART RATE: 126 BPM | OXYGEN SATURATION: 98 % | SYSTOLIC BLOOD PRESSURE: 96 MMHG | WEIGHT: 125 LBS

## 2022-06-22 DIAGNOSIS — M31.19 TTP (THROMBOTIC THROMBOCYTOPENIC PURPURA) (HCC): Primary | ICD-10-CM

## 2022-06-22 DIAGNOSIS — R79.9 ABNORMAL BLOOD CHEMISTRY: ICD-10-CM

## 2022-06-22 DIAGNOSIS — Z31.69 PRE-CONCEPTION COUNSELING: ICD-10-CM

## 2022-06-22 DIAGNOSIS — H80.92 OTOSCLEROSIS OF LEFT EAR: ICD-10-CM

## 2022-06-22 DIAGNOSIS — R53.83 FATIGUE: ICD-10-CM

## 2022-06-22 DIAGNOSIS — D59.9 ACQUIRED HEMOLYTIC ANEMIA (HCC): ICD-10-CM

## 2022-06-22 LAB
ALBUMIN SERPL-MCNC: 3.6 G/DL (ref 3.4–5)
ALBUMIN/GLOB SERPL: 1 {RATIO} (ref 1–2)
ALP LIVER SERPL-CCNC: 73 U/L
ALT SERPL-CCNC: 37 U/L
ANION GAP SERPL CALC-SCNC: 6 MMOL/L (ref 0–18)
AST SERPL-CCNC: 24 U/L (ref 15–37)
BASOPHILS # BLD AUTO: 0.03 X10(3) UL (ref 0–0.2)
BASOPHILS NFR BLD AUTO: 0.5 %
BILIRUB SERPL-MCNC: 0.3 MG/DL (ref 0.1–2)
BUN BLD-MCNC: 10 MG/DL (ref 7–18)
CALCIUM BLD-MCNC: 9 MG/DL (ref 8.5–10.1)
CHLORIDE SERPL-SCNC: 107 MMOL/L (ref 98–112)
CO2 SERPL-SCNC: 23 MMOL/L (ref 21–32)
CREAT BLD-MCNC: 0.76 MG/DL
EOSINOPHIL # BLD AUTO: 0.07 X10(3) UL (ref 0–0.7)
EOSINOPHIL NFR BLD AUTO: 1.1 %
ERYTHROCYTE [DISTWIDTH] IN BLOOD BY AUTOMATED COUNT: 12.2 %
FASTING STATUS PATIENT QL REPORTED: NO
GLOBULIN PLAS-MCNC: 3.7 G/DL (ref 2.8–4.4)
GLUCOSE BLD-MCNC: 131 MG/DL (ref 70–99)
HAPTOGLOB SERPL-MCNC: 100 MG/DL (ref 30–200)
HCT VFR BLD AUTO: 41.2 %
HGB BLD-MCNC: 13.6 G/DL
HGB RETIC QN AUTO: 33.3 PG (ref 28.2–36.6)
IMM GRANULOCYTES # BLD AUTO: 0.01 X10(3) UL (ref 0–1)
IMM GRANULOCYTES NFR BLD: 0.2 %
IMM RETICS NFR: 0.01 RATIO (ref 0.1–0.3)
LDH SERPL L TO P-CCNC: 184 U/L
LYMPHOCYTES # BLD AUTO: 2.18 X10(3) UL (ref 1–4)
LYMPHOCYTES NFR BLD AUTO: 33.6 %
MCH RBC QN AUTO: 27.8 PG (ref 26–34)
MCHC RBC AUTO-ENTMCNC: 33 G/DL (ref 31–37)
MCV RBC AUTO: 84.1 FL
MONOCYTES # BLD AUTO: 0.48 X10(3) UL (ref 0.1–1)
MONOCYTES NFR BLD AUTO: 7.4 %
NEUTROPHILS # BLD AUTO: 3.72 X10 (3) UL (ref 1.5–7.7)
NEUTROPHILS # BLD AUTO: 3.72 X10(3) UL (ref 1.5–7.7)
NEUTROPHILS NFR BLD AUTO: 57.2 %
OSMOLALITY SERPL CALC.SUM OF ELEC: 283 MOSM/KG (ref 275–295)
PLATELET # BLD AUTO: 297 10(3)UL (ref 150–450)
POTASSIUM SERPL-SCNC: 3.7 MMOL/L (ref 3.5–5.1)
PROT SERPL-MCNC: 7.3 G/DL (ref 6.4–8.2)
RBC # BLD AUTO: 4.9 X10(6)UL
RETICS # AUTO: 34.8 X10(3) UL (ref 22.5–147.5)
RETICS/RBC NFR AUTO: 0.7 %
SODIUM SERPL-SCNC: 136 MMOL/L (ref 136–145)
T3FREE SERPL-MCNC: 2.92 PG/ML (ref 2.4–4.2)
T4 FREE SERPL-MCNC: 1.1 NG/DL (ref 0.8–1.7)
TSI SER-ACNC: 3.58 MIU/ML (ref 0.36–3.74)
WBC # BLD AUTO: 6.5 X10(3) UL (ref 4–11)

## 2022-06-22 PROCEDURE — 99214 OFFICE O/P EST MOD 30 MIN: CPT | Performed by: INTERNAL MEDICINE

## 2022-06-23 LAB — THYROPEROXIDASE AB SERPL-ACNC: 146 U/ML (ref ?–60)

## 2022-06-24 LAB — ADAMTS13 ACTIVITY: 67 %

## 2022-09-21 ENCOUNTER — OFFICE VISIT (OUTPATIENT)
Dept: HEMATOLOGY/ONCOLOGY | Facility: HOSPITAL | Age: 32
End: 2022-09-21
Attending: INTERNAL MEDICINE

## 2022-09-21 VITALS
RESPIRATION RATE: 16 BRPM | HEART RATE: 105 BPM | DIASTOLIC BLOOD PRESSURE: 64 MMHG | BODY MASS INDEX: 23.98 KG/M2 | OXYGEN SATURATION: 98 % | HEIGHT: 60.83 IN | SYSTOLIC BLOOD PRESSURE: 99 MMHG | WEIGHT: 127 LBS | TEMPERATURE: 100 F

## 2022-09-21 DIAGNOSIS — M31.19 TTP (THROMBOTIC THROMBOCYTOPENIC PURPURA) (HCC): Primary | ICD-10-CM

## 2022-09-21 DIAGNOSIS — H80.92 OTOSCLEROSIS OF LEFT EAR: ICD-10-CM

## 2022-09-21 DIAGNOSIS — Z78.9: ICD-10-CM

## 2022-09-21 LAB
ALBUMIN SERPL-MCNC: 3.4 G/DL (ref 3.4–5)
ALBUMIN/GLOB SERPL: 0.9 {RATIO} (ref 1–2)
ALP LIVER SERPL-CCNC: 61 U/L
ALT SERPL-CCNC: 33 U/L
ANION GAP SERPL CALC-SCNC: 8 MMOL/L (ref 0–18)
AST SERPL-CCNC: 18 U/L (ref 15–37)
BASOPHILS # BLD AUTO: 0.02 X10(3) UL (ref 0–0.2)
BASOPHILS NFR BLD AUTO: 0.3 %
BILIRUB SERPL-MCNC: 0.4 MG/DL (ref 0.1–2)
BUN BLD-MCNC: 10 MG/DL (ref 7–18)
CALCIUM BLD-MCNC: 8.9 MG/DL (ref 8.5–10.1)
CHLORIDE SERPL-SCNC: 110 MMOL/L (ref 98–112)
CO2 SERPL-SCNC: 20 MMOL/L (ref 21–32)
CREAT BLD-MCNC: 0.76 MG/DL
EOSINOPHIL # BLD AUTO: 0.1 X10(3) UL (ref 0–0.7)
EOSINOPHIL NFR BLD AUTO: 1.5 %
ERYTHROCYTE [DISTWIDTH] IN BLOOD BY AUTOMATED COUNT: 12.4 %
GFR SERPLBLD BASED ON 1.73 SQ M-ARVRAT: 106 ML/MIN/1.73M2 (ref 60–?)
GLOBULIN PLAS-MCNC: 3.7 G/DL (ref 2.8–4.4)
GLUCOSE BLD-MCNC: 140 MG/DL (ref 70–99)
HAPTOGLOB SERPL-MCNC: 100 MG/DL (ref 30–200)
HCT VFR BLD AUTO: 39.7 %
HGB BLD-MCNC: 13.5 G/DL
HGB RETIC QN AUTO: 32.5 PG (ref 28.2–36.6)
IMM GRANULOCYTES # BLD AUTO: 0.02 X10(3) UL (ref 0–1)
IMM GRANULOCYTES NFR BLD: 0.3 %
IMM RETICS NFR: 0.01 RATIO (ref 0.1–0.3)
LDH SERPL L TO P-CCNC: 159 U/L
LYMPHOCYTES # BLD AUTO: 2.06 X10(3) UL (ref 1–4)
LYMPHOCYTES NFR BLD AUTO: 30.8 %
MCH RBC QN AUTO: 28.4 PG (ref 26–34)
MCHC RBC AUTO-ENTMCNC: 34 G/DL (ref 31–37)
MCV RBC AUTO: 83.4 FL
MONOCYTES # BLD AUTO: 0.63 X10(3) UL (ref 0.1–1)
MONOCYTES NFR BLD AUTO: 9.4 %
NEUTROPHILS # BLD AUTO: 3.85 X10 (3) UL (ref 1.5–7.7)
NEUTROPHILS # BLD AUTO: 3.85 X10(3) UL (ref 1.5–7.7)
NEUTROPHILS NFR BLD AUTO: 57.7 %
OSMOLALITY SERPL CALC.SUM OF ELEC: 287 MOSM/KG (ref 275–295)
PLATELET # BLD AUTO: 244 10(3)UL (ref 150–450)
POTASSIUM SERPL-SCNC: 3.7 MMOL/L (ref 3.5–5.1)
PROT SERPL-MCNC: 7.1 G/DL (ref 6.4–8.2)
RBC # BLD AUTO: 4.76 X10(6)UL
RETICS # AUTO: 25.7 X10(3) UL (ref 22.5–147.5)
RETICS/RBC NFR AUTO: 0.5 %
SODIUM SERPL-SCNC: 138 MMOL/L (ref 136–145)
WBC # BLD AUTO: 6.7 X10(3) UL (ref 4–11)

## 2022-09-21 PROCEDURE — 99214 OFFICE O/P EST MOD 30 MIN: CPT | Performed by: INTERNAL MEDICINE

## 2022-09-26 LAB — ADAMTS13 ACTIVITY: 97 %

## 2022-11-16 ENCOUNTER — ANESTHESIA EVENT (OUTPATIENT)
Dept: SURGERY | Facility: HOSPITAL | Age: 32
End: 2022-11-16
Payer: COMMERCIAL

## 2022-11-17 ENCOUNTER — HOSPITAL ENCOUNTER (OUTPATIENT)
Facility: HOSPITAL | Age: 32
Setting detail: HOSPITAL OUTPATIENT SURGERY
Discharge: HOME | End: 2022-11-17
Attending: OTOLARYNGOLOGY | Admitting: OTOLARYNGOLOGY
Payer: COMMERCIAL

## 2022-11-17 ENCOUNTER — ANESTHESIA (OUTPATIENT)
Dept: SURGERY | Facility: HOSPITAL | Age: 32
End: 2022-11-17
Payer: COMMERCIAL

## 2022-11-17 VITALS
SYSTOLIC BLOOD PRESSURE: 104 MMHG | HEART RATE: 89 BPM | HEIGHT: 61 IN | RESPIRATION RATE: 18 BRPM | BODY MASS INDEX: 23.79 KG/M2 | OXYGEN SATURATION: 99 % | DIASTOLIC BLOOD PRESSURE: 72 MMHG | TEMPERATURE: 97 F | WEIGHT: 126 LBS

## 2022-11-17 PROBLEM — H90.12 CONDUCTIVE HEARING LOSS IN LEFT EAR: Status: ACTIVE | Noted: 2022-11-17

## 2022-11-17 LAB
B-HCG UR QL: NEGATIVE
ERYTHROCYTE [DISTWIDTH] IN BLOOD BY AUTOMATED COUNT: 12.3 %
HCT VFR BLD AUTO: 42 %
HGB BLD-MCNC: 14 G/DL
MCH RBC QN AUTO: 27.9 PG (ref 26–34)
MCHC RBC AUTO-ENTMCNC: 33.3 G/DL (ref 31–37)
MCV RBC AUTO: 83.8 FL
PLATELET # BLD AUTO: 278 10(3)UL (ref 150–450)
RBC # BLD AUTO: 5.01 X10(6)UL
SARS-COV-2 RNA RESP QL NAA+PROBE: NOT DETECTED
WBC # BLD AUTO: 6.9 X10(3) UL (ref 4–11)

## 2022-11-17 PROCEDURE — 09JJ0ZZ INSPECTION OF LEFT EAR, OPEN APPROACH: ICD-10-PCS | Performed by: OTOLARYNGOLOGY

## 2022-11-17 PROCEDURE — 85027 COMPLETE CBC AUTOMATED: CPT

## 2022-11-17 PROCEDURE — 81025 URINE PREGNANCY TEST: CPT

## 2022-11-17 RX ORDER — ONDANSETRON 2 MG/ML
INJECTION INTRAMUSCULAR; INTRAVENOUS AS NEEDED
Status: DISCONTINUED | OUTPATIENT
Start: 2022-11-17 | End: 2022-11-17 | Stop reason: SURG

## 2022-11-17 RX ORDER — ACETAMINOPHEN 500 MG
1000 TABLET ORAL ONCE AS NEEDED
Status: COMPLETED | OUTPATIENT
Start: 2022-11-17 | End: 2022-11-17

## 2022-11-17 RX ORDER — SODIUM CHLORIDE, SODIUM LACTATE, POTASSIUM CHLORIDE, CALCIUM CHLORIDE 600; 310; 30; 20 MG/100ML; MG/100ML; MG/100ML; MG/100ML
INJECTION, SOLUTION INTRAVENOUS CONTINUOUS
Status: DISCONTINUED | OUTPATIENT
Start: 2022-11-17 | End: 2022-11-17

## 2022-11-17 RX ORDER — CEFAZOLIN SODIUM/WATER 2 G/20 ML
2 SYRINGE (ML) INTRAVENOUS ONCE
Status: COMPLETED | OUTPATIENT
Start: 2022-11-17 | End: 2022-11-17

## 2022-11-17 RX ORDER — NALOXONE HYDROCHLORIDE 0.4 MG/ML
80 INJECTION, SOLUTION INTRAMUSCULAR; INTRAVENOUS; SUBCUTANEOUS AS NEEDED
Status: DISCONTINUED | OUTPATIENT
Start: 2022-11-17 | End: 2022-11-17

## 2022-11-17 RX ORDER — HYDROMORPHONE HYDROCHLORIDE 1 MG/ML
0.4 INJECTION, SOLUTION INTRAMUSCULAR; INTRAVENOUS; SUBCUTANEOUS EVERY 5 MIN PRN
Status: DISCONTINUED | OUTPATIENT
Start: 2022-11-17 | End: 2022-11-17

## 2022-11-17 RX ORDER — ONDANSETRON 4 MG/1
4 TABLET, ORALLY DISINTEGRATING ORAL EVERY 8 HOURS PRN
Qty: 10 TABLET | Refills: 0 | Status: SHIPPED | OUTPATIENT
Start: 2022-11-17

## 2022-11-17 RX ORDER — HYDROCODONE BITARTRATE AND ACETAMINOPHEN 5; 325 MG/1; MG/1
2 TABLET ORAL ONCE AS NEEDED
Status: COMPLETED | OUTPATIENT
Start: 2022-11-17 | End: 2022-11-17

## 2022-11-17 RX ORDER — HYDROMORPHONE HYDROCHLORIDE 1 MG/ML
0.6 INJECTION, SOLUTION INTRAMUSCULAR; INTRAVENOUS; SUBCUTANEOUS EVERY 5 MIN PRN
Status: DISCONTINUED | OUTPATIENT
Start: 2022-11-17 | End: 2022-11-17

## 2022-11-17 RX ORDER — ONDANSETRON 2 MG/ML
4 INJECTION INTRAMUSCULAR; INTRAVENOUS EVERY 6 HOURS PRN
Status: DISCONTINUED | OUTPATIENT
Start: 2022-11-17 | End: 2022-11-17

## 2022-11-17 RX ORDER — HYDROMORPHONE HYDROCHLORIDE 1 MG/ML
0.2 INJECTION, SOLUTION INTRAMUSCULAR; INTRAVENOUS; SUBCUTANEOUS EVERY 5 MIN PRN
Status: DISCONTINUED | OUTPATIENT
Start: 2022-11-17 | End: 2022-11-17

## 2022-11-17 RX ORDER — LIDOCAINE HYDROCHLORIDE 10 MG/ML
INJECTION, SOLUTION EPIDURAL; INFILTRATION; INTRACAUDAL; PERINEURAL AS NEEDED
Status: DISCONTINUED | OUTPATIENT
Start: 2022-11-17 | End: 2022-11-17 | Stop reason: SURG

## 2022-11-17 RX ORDER — LIDOCAINE HYDROCHLORIDE AND EPINEPHRINE 10; 10 MG/ML; UG/ML
INJECTION, SOLUTION INFILTRATION; PERINEURAL AS NEEDED
Status: DISCONTINUED | OUTPATIENT
Start: 2022-11-17 | End: 2022-11-17 | Stop reason: HOSPADM

## 2022-11-17 RX ORDER — HYDROCODONE BITARTRATE AND ACETAMINOPHEN 5; 325 MG/1; MG/1
1-2 TABLET ORAL EVERY 6 HOURS PRN
Qty: 20 TABLET | Refills: 0 | Status: SHIPPED | OUTPATIENT
Start: 2022-11-17

## 2022-11-17 RX ORDER — DEXAMETHASONE SODIUM PHOSPHATE 4 MG/ML
VIAL (ML) INJECTION AS NEEDED
Status: DISCONTINUED | OUTPATIENT
Start: 2022-11-17 | End: 2022-11-17 | Stop reason: SURG

## 2022-11-17 RX ORDER — ACETAMINOPHEN 500 MG
1000 TABLET ORAL ONCE
Status: DISCONTINUED | OUTPATIENT
Start: 2022-11-17 | End: 2022-11-17 | Stop reason: HOSPADM

## 2022-11-17 RX ORDER — SCOLOPAMINE TRANSDERMAL SYSTEM 1 MG/1
1 PATCH, EXTENDED RELEASE TRANSDERMAL ONCE
Status: DISCONTINUED | OUTPATIENT
Start: 2022-11-17 | End: 2022-11-17

## 2022-11-17 RX ORDER — HYDROCODONE BITARTRATE AND ACETAMINOPHEN 5; 325 MG/1; MG/1
1 TABLET ORAL ONCE AS NEEDED
Status: COMPLETED | OUTPATIENT
Start: 2022-11-17 | End: 2022-11-17

## 2022-11-17 RX ORDER — PROCHLORPERAZINE EDISYLATE 5 MG/ML
5 INJECTION INTRAMUSCULAR; INTRAVENOUS EVERY 8 HOURS PRN
Status: DISCONTINUED | OUTPATIENT
Start: 2022-11-17 | End: 2022-11-17

## 2022-11-17 RX ADMIN — DEXAMETHASONE SODIUM PHOSPHATE 12 MG: 4 MG/ML VIAL (ML) INJECTION at 07:20:00

## 2022-11-17 RX ADMIN — SODIUM CHLORIDE, SODIUM LACTATE, POTASSIUM CHLORIDE, CALCIUM CHLORIDE: 600; 310; 30; 20 INJECTION, SOLUTION INTRAVENOUS at 08:36:00

## 2022-11-17 RX ADMIN — CEFAZOLIN SODIUM/WATER 2 G: 2 G/20 ML SYRINGE (ML) INTRAVENOUS at 07:20:00

## 2022-11-17 RX ADMIN — ONDANSETRON 4 MG: 2 INJECTION INTRAMUSCULAR; INTRAVENOUS at 07:26:00

## 2022-11-17 RX ADMIN — LIDOCAINE HYDROCHLORIDE 50 MG: 10 INJECTION, SOLUTION EPIDURAL; INFILTRATION; INTRACAUDAL; PERINEURAL at 07:15:00

## 2022-11-17 NOTE — ANESTHESIA PROCEDURE NOTES
Airway  Date/Time: 11/17/2022 7:17 AM  Urgency: elective    Airway not difficult    General Information and Staff    Patient location during procedure: OR  Anesthesiologist: Fabrizio Ortiz DO  Performed: anesthesiologist     Indications and Patient Condition  Indications for airway management: anesthesia  Spontaneous ventilation: present  Sedation level: deep  Preoxygenated: yes  Patient position: sniffing  Mask difficulty assessment: 1 - vent by mask    Final Airway Details  Final airway type: endotracheal airway      Successful airway: ETT  Cuffed: yes   Successful intubation technique: direct laryngoscopy  Endotracheal tube insertion site: oral  Blade: Jacki  Blade size: #3  ETT size (mm): 7.0    Cormack-Lehane Classification: grade I - full view of glottis  Placement verified by: chest auscultation and capnometry   Measured from: lips  ETT to lips (cm): 21  Number of attempts at approach: 1  Ventilation between attempts: none  Number of other approaches attempted: 0    Additional Comments  Atraumatic insertion of ETT  Dentition, lips, gums intact as preinduction

## 2022-11-17 NOTE — BRIEF OP NOTE
Pre-Operative Diagnosis: CONDUCTIVE HEARING     Post-Operative Diagnosis: CONDUCTIVE HEARING      Procedure Performed:   LEFT MIDDLE EAR EXPLORATION    Surgeon(s) and Role:     * Patience Morrow MD - Primary    Assistant(s):        Surgical Findings: See op report     Specimen: None     Estimated Blood Loss: Blood Output: 2 mL (11/17/2022  8:25 AM)      Dictation Number:  SHAWN Small MD  11/17/2022  8:31 AM

## 2022-11-17 NOTE — INTERVAL H&P NOTE
Pre-op Diagnosis: CONDUCTIVE HEARING    The above referenced H&P was reviewed by Russel Mary MD on 11/17/2022, the patient was examined and no significant changes have occurred in the patient's condition since the H&P was performed. I discussed with the patient and/or legal representative the potential benefits, risks and side effects of this procedure; the likelihood of the patient achieving goals; and potential problems that might occur during recuperation. I discussed reasonable alternatives to the procedure, including risks, benefits and side effects related to the alternatives and risks related to not receiving this procedure. We will proceed with procedure as planned.

## 2022-11-17 NOTE — OPERATIVE REPORT
Operative Report  [address] [facility] MRN [id number]   Operating Physician Susan Reyes MD Operation Date 11/17/2022       Pre-Operative Diagnosis: LEFT CONDUCTIVE HEARING LOSS, LEFT OTOSCLEROSIS    Post-Operative Diagnosis: Same as above    Procedure Performed: Procedure(s):  LEFT MIDDLE EAR EXPLORATION    Anesthesia: General     EBL: 2cc    Specimen: None    Complications: None apparent    Findings: Left stapes fixed with exposed facial nerve overhanging posterior footplate and nearly contacting posterior claribel of the stapes. Due to risk of facial nerve injury, stapedectomy was not completed. Indications: The patient is a 34year old female with a history of left conductive hearing loss most consistent with otosclerosis. Therefore, it was determined she may benefit from the above procedure. The risks, benefits and alternatives of the procedure were discussed with the patient including the risks of bleeding, infection, anesthesia, tympanic membrane perforation, dizziness, worsening hearing loss, facial nerve injury, taste disturbance, otorrhea or infection, tinnitus, prosthesis failure, and need for further surgery. Informed consent was obtained. Description of Procedure: The patient was properly identified in the preoperative area, taken back to the operating room, and placed supine on the operating room table. Anesthesia was administered via endotracheal intubation. The bed was turned 180 degrees and the patient was positioned with her left ear up. The Medtronic facial nerve monitor electrodes were placed and proper impedence and artifactual response were confirmed. The ear was prepped and draped in a sterile fashion. Lidocaine 1% with 1:100,000 epinephrine was injected into all four quadrants of the external auditory canal. Using binocular microscopic visualization, the ear canal a curved incision was made approximately 6mm from the annulus with a [address] [place] blade.  The canal skin was elevated and the tympanomeatal flatp was raised. The middle ear was entered. At this point, lack of neuromuscular blockade was confirmed with anesthesia and current flow through the nerve stimulator was confirmed on soft tissue. Scutum was removed with a curette until the ossicles could be adequately examined and palpated. The chorda tympani nerve was positioned for good visualization. The stapes was fixed and the incus and malleus were mobile. The round window was not obliterated. The facial nerve was exposed with no bony covering inferiorly. It was stimulated with 0.2 mAmp and with palpation. Posteriorly the facial nerve was impinging on the footplate and nearly contacting the posterior claribel. Due to the close proximity of the facial nerve and footplate, the risk of facial nerve injury with laser use was deemed to be too high so a stapedectomy was not performed. The tympanomeatal flap was replaced to its original position and secured in place with gelfoam and bacitracin ointment to fill the ear canal. A Ana dressing was placed. The patient was allowed to awaken from general anesthetic. The patient tolerated the procedure well and there were no apparent complications at the end of the procedure. Jeramy was transferred to the post anesthesia care unit in stable condition where her facial nerve was noted to be intact. Findings were discussed with her  and photos of the anatomy were shared with him. I recommend she see neurotology if she is interested in still considering surgery, or she may continue treatment with hearing aids.      Margie Ly MD  11/17/2022  8:32 AM

## 2022-12-22 ENCOUNTER — APPOINTMENT (OUTPATIENT)
Dept: HEMATOLOGY/ONCOLOGY | Facility: HOSPITAL | Age: 32
End: 2022-12-22
Attending: INTERNAL MEDICINE
Payer: COMMERCIAL

## 2022-12-29 ENCOUNTER — OFFICE VISIT (OUTPATIENT)
Dept: HEMATOLOGY/ONCOLOGY | Facility: HOSPITAL | Age: 32
End: 2022-12-29
Attending: INTERNAL MEDICINE
Payer: COMMERCIAL

## 2022-12-29 VITALS
RESPIRATION RATE: 18 BRPM | HEIGHT: 60.83 IN | OXYGEN SATURATION: 97 % | TEMPERATURE: 99 F | DIASTOLIC BLOOD PRESSURE: 76 MMHG | BODY MASS INDEX: 24.43 KG/M2 | WEIGHT: 129.38 LBS | SYSTOLIC BLOOD PRESSURE: 114 MMHG | HEART RATE: 107 BPM

## 2022-12-29 DIAGNOSIS — Z31.69 PRE-CONCEPTION COUNSELING: ICD-10-CM

## 2022-12-29 DIAGNOSIS — M31.19 TTP (THROMBOTIC THROMBOCYTOPENIC PURPURA) (HCC): Primary | ICD-10-CM

## 2022-12-29 LAB
ALBUMIN SERPL-MCNC: 3.7 G/DL (ref 3.4–5)
ALBUMIN/GLOB SERPL: 1.1 {RATIO} (ref 1–2)
ALP LIVER SERPL-CCNC: 68 U/L
ALT SERPL-CCNC: 38 U/L
ANION GAP SERPL CALC-SCNC: 8 MMOL/L (ref 0–18)
AST SERPL-CCNC: 26 U/L (ref 15–37)
BASOPHILS # BLD AUTO: 0.03 X10(3) UL (ref 0–0.2)
BASOPHILS NFR BLD AUTO: 0.4 %
BILIRUB SERPL-MCNC: 0.2 MG/DL (ref 0.1–2)
BUN BLD-MCNC: 10 MG/DL (ref 7–18)
CALCIUM BLD-MCNC: 9.8 MG/DL (ref 8.5–10.1)
CHLORIDE SERPL-SCNC: 108 MMOL/L (ref 98–112)
CO2 SERPL-SCNC: 21 MMOL/L (ref 21–32)
CREAT BLD-MCNC: 0.87 MG/DL
EOSINOPHIL # BLD AUTO: 0.13 X10(3) UL (ref 0–0.7)
EOSINOPHIL NFR BLD AUTO: 1.6 %
ERYTHROCYTE [DISTWIDTH] IN BLOOD BY AUTOMATED COUNT: 12.5 %
GFR SERPLBLD BASED ON 1.73 SQ M-ARVRAT: 90 ML/MIN/1.73M2 (ref 60–?)
GLOBULIN PLAS-MCNC: 3.5 G/DL (ref 2.8–4.4)
GLUCOSE BLD-MCNC: 123 MG/DL (ref 70–99)
HAPTOGLOB SERPL-MCNC: 103 MG/DL (ref 30–200)
HCT VFR BLD AUTO: 41 %
HGB BLD-MCNC: 14 G/DL
HGB RETIC QN AUTO: 32 PG (ref 28.2–36.6)
IMM GRANULOCYTES # BLD AUTO: 0.02 X10(3) UL (ref 0–1)
IMM GRANULOCYTES NFR BLD: 0.2 %
IMM RETICS NFR: 0.03 RATIO (ref 0.1–0.3)
LDH SERPL L TO P-CCNC: 162 U/L
LYMPHOCYTES # BLD AUTO: 2.73 X10(3) UL (ref 1–4)
LYMPHOCYTES NFR BLD AUTO: 33.1 %
MCH RBC QN AUTO: 28.3 PG (ref 26–34)
MCHC RBC AUTO-ENTMCNC: 34.1 G/DL (ref 31–37)
MCV RBC AUTO: 83 FL
MONOCYTES # BLD AUTO: 0.58 X10(3) UL (ref 0.1–1)
MONOCYTES NFR BLD AUTO: 7 %
NEUTROPHILS # BLD AUTO: 4.75 X10 (3) UL (ref 1.5–7.7)
NEUTROPHILS # BLD AUTO: 4.75 X10(3) UL (ref 1.5–7.7)
NEUTROPHILS NFR BLD AUTO: 57.7 %
OSMOLALITY SERPL CALC.SUM OF ELEC: 284 MOSM/KG (ref 275–295)
PLATELET # BLD AUTO: 288 10(3)UL (ref 150–450)
POTASSIUM SERPL-SCNC: 3.7 MMOL/L (ref 3.5–5.1)
PROT SERPL-MCNC: 7.2 G/DL (ref 6.4–8.2)
RBC # BLD AUTO: 4.94 X10(6)UL
RETICS # AUTO: 39 X10(3) UL (ref 22.5–147.5)
RETICS/RBC NFR AUTO: 0.8 %
SODIUM SERPL-SCNC: 137 MMOL/L (ref 136–145)
WBC # BLD AUTO: 8.2 X10(3) UL (ref 4–11)

## 2022-12-29 PROCEDURE — 99214 OFFICE O/P EST MOD 30 MIN: CPT | Performed by: INTERNAL MEDICINE

## 2023-01-01 LAB — ADAMTS13 ACTIVITY: >100 %

## 2023-01-03 ENCOUNTER — OFFICE VISIT (OUTPATIENT)
Dept: INTERNAL MEDICINE CLINIC | Facility: CLINIC | Age: 33
End: 2023-01-03
Payer: COMMERCIAL

## 2023-01-03 VITALS
DIASTOLIC BLOOD PRESSURE: 58 MMHG | TEMPERATURE: 99 F | BODY MASS INDEX: 24.87 KG/M2 | HEIGHT: 60.63 IN | SYSTOLIC BLOOD PRESSURE: 106 MMHG | RESPIRATION RATE: 16 BRPM | OXYGEN SATURATION: 97 % | HEART RATE: 86 BPM | WEIGHT: 130 LBS

## 2023-01-03 DIAGNOSIS — M31.19 TTP (THROMBOTIC THROMBOCYTOPENIC PURPURA) (HCC): ICD-10-CM

## 2023-01-03 DIAGNOSIS — Z86.69 HISTORY OF MIGRAINE: ICD-10-CM

## 2023-01-03 DIAGNOSIS — Z00.00 ROUTINE GENERAL MEDICAL EXAMINATION AT A HEALTH CARE FACILITY: Primary | ICD-10-CM

## 2023-01-03 DIAGNOSIS — H80.92 OTOSCLEROSIS OF LEFT EAR: ICD-10-CM

## 2023-01-03 DIAGNOSIS — Z13.220 SCREENING FOR LIPID DISORDERS: ICD-10-CM

## 2023-01-03 PROCEDURE — 3008F BODY MASS INDEX DOCD: CPT | Performed by: INTERNAL MEDICINE

## 2023-01-03 PROCEDURE — 3074F SYST BP LT 130 MM HG: CPT | Performed by: INTERNAL MEDICINE

## 2023-01-03 PROCEDURE — 3078F DIAST BP <80 MM HG: CPT | Performed by: INTERNAL MEDICINE

## 2023-01-03 PROCEDURE — 99385 PREV VISIT NEW AGE 18-39: CPT | Performed by: INTERNAL MEDICINE

## 2023-02-27 ENCOUNTER — OFFICE VISIT (OUTPATIENT)
Dept: OBGYN CLINIC | Facility: CLINIC | Age: 33
End: 2023-02-27
Payer: COMMERCIAL

## 2023-02-27 VITALS
SYSTOLIC BLOOD PRESSURE: 110 MMHG | DIASTOLIC BLOOD PRESSURE: 70 MMHG | RESPIRATION RATE: 19 BRPM | HEIGHT: 60.83 IN | HEART RATE: 97 BPM | BODY MASS INDEX: 24.35 KG/M2 | WEIGHT: 129 LBS

## 2023-02-27 DIAGNOSIS — Z12.4 SCREENING FOR CERVICAL CANCER: ICD-10-CM

## 2023-02-27 DIAGNOSIS — Z01.419 WELL WOMAN EXAM WITH ROUTINE GYNECOLOGICAL EXAM: Primary | ICD-10-CM

## 2023-02-27 PROCEDURE — 3008F BODY MASS INDEX DOCD: CPT | Performed by: OBSTETRICS & GYNECOLOGY

## 2023-02-27 PROCEDURE — 3078F DIAST BP <80 MM HG: CPT | Performed by: OBSTETRICS & GYNECOLOGY

## 2023-02-27 PROCEDURE — 3074F SYST BP LT 130 MM HG: CPT | Performed by: OBSTETRICS & GYNECOLOGY

## 2023-02-27 PROCEDURE — 99385 PREV VISIT NEW AGE 18-39: CPT | Performed by: OBSTETRICS & GYNECOLOGY

## 2023-03-14 LAB — HPV I/H RISK 1 DNA SPEC QL NAA+PROBE: NEGATIVE

## 2023-03-15 LAB
LAST PAP RESULT: NORMAL
PAP HISTORY (OTHER THAN LAST PAP): NORMAL

## 2023-03-20 ENCOUNTER — TELEPHONE (OUTPATIENT)
Dept: OBGYN CLINIC | Facility: CLINIC | Age: 33
End: 2023-03-20

## 2023-03-20 NOTE — TELEPHONE ENCOUNTER
Patient calling to initiate prenatal care  LMP 2/15/22  Patient is 7-8 weeks on   Confirmation Ultrasound and Appointment scheduled on   Future Appointments   Date Time Provider Fracisco Alleni   3/29/2023  3:00 PM Vila Peabody, MD [facility]   4/20/2023  3:00 PM [facility] OB US [place] [facility] [place]   4/20/2023  3:45 PM Ivelisse Cordoba MD [facility] [place]   5/16/2023 11:30 AM Yary Webb MD [facility] [place]         Any history of ectopic pregnancy? NO  Any history of miscarriage? NO  Any medications that you are taking on a regular basis other than prenatal vitamins?   DHA (if not taking prenatal vitamins, encourage patient to start taking.)  Any bleeding since the first day of last LMP and your positive pregnancy test?     Heidi

## 2023-03-29 ENCOUNTER — OFFICE VISIT (OUTPATIENT)
Dept: HEMATOLOGY/ONCOLOGY | Facility: HOSPITAL | Age: 33
End: 2023-03-29
Attending: INTERNAL MEDICINE
Payer: COMMERCIAL

## 2023-03-29 VITALS
TEMPERATURE: 99 F | WEIGHT: 131.38 LBS | HEART RATE: 98 BPM | BODY MASS INDEX: 24.8 KG/M2 | RESPIRATION RATE: 20 BRPM | OXYGEN SATURATION: 100 % | HEIGHT: 60.83 IN | DIASTOLIC BLOOD PRESSURE: 67 MMHG | SYSTOLIC BLOOD PRESSURE: 110 MMHG

## 2023-03-29 DIAGNOSIS — O99.119 THROMBOPHILIA COMPLICATING PREGNANCY, ANTEPARTUM (HCC): ICD-10-CM

## 2023-03-29 DIAGNOSIS — M31.19 TTP (THROMBOTIC THROMBOCYTOPENIC PURPURA) (HCC): Primary | ICD-10-CM

## 2023-03-29 DIAGNOSIS — D68.59 THROMBOPHILIA COMPLICATING PREGNANCY, ANTEPARTUM (HCC): ICD-10-CM

## 2023-03-29 LAB
ALBUMIN SERPL-MCNC: 3.3 G/DL (ref 3.4–5)
ALBUMIN/GLOB SERPL: 0.8 {RATIO} (ref 1–2)
ALP LIVER SERPL-CCNC: 55 U/L
ALT SERPL-CCNC: 28 U/L
ANION GAP SERPL CALC-SCNC: 4 MMOL/L (ref 0–18)
AST SERPL-CCNC: 15 U/L (ref 15–37)
BASOPHILS # BLD AUTO: 0.04 X10(3) UL (ref 0–0.2)
BASOPHILS NFR BLD AUTO: 0.4 %
BILIRUB SERPL-MCNC: 0.2 MG/DL (ref 0.1–2)
BUN BLD-MCNC: 9 MG/DL (ref 7–18)
CALCIUM BLD-MCNC: 9.2 MG/DL (ref 8.5–10.1)
CHLORIDE SERPL-SCNC: 107 MMOL/L (ref 98–112)
CO2 SERPL-SCNC: 25 MMOL/L (ref 21–32)
CREAT BLD-MCNC: 0.6 MG/DL
EOSINOPHIL # BLD AUTO: 0.09 X10(3) UL (ref 0–0.7)
EOSINOPHIL NFR BLD AUTO: 0.9 %
ERYTHROCYTE [DISTWIDTH] IN BLOOD BY AUTOMATED COUNT: 13.1 %
GFR SERPLBLD BASED ON 1.73 SQ M-ARVRAT: 121 ML/MIN/1.73M2 (ref 60–?)
GLOBULIN PLAS-MCNC: 3.9 G/DL (ref 2.8–4.4)
GLUCOSE BLD-MCNC: 103 MG/DL (ref 70–99)
HAPTOGLOB SERPL-MCNC: 117 MG/DL (ref 30–200)
HCT VFR BLD AUTO: 38 %
HGB BLD-MCNC: 12.6 G/DL
HGB RETIC QN AUTO: 33.2 PG (ref 28.2–36.6)
IMM GRANULOCYTES # BLD AUTO: 0.03 X10(3) UL (ref 0–1)
IMM GRANULOCYTES NFR BLD: 0.3 %
IMM RETICS NFR: 0.05 RATIO (ref 0.1–0.3)
LDH SERPL L TO P-CCNC: 145 U/L
LYMPHOCYTES # BLD AUTO: 2.63 X10(3) UL (ref 1–4)
LYMPHOCYTES NFR BLD AUTO: 26.9 %
MCH RBC QN AUTO: 27.5 PG (ref 26–34)
MCHC RBC AUTO-ENTMCNC: 33.2 G/DL (ref 31–37)
MCV RBC AUTO: 83 FL
MONOCYTES # BLD AUTO: 0.75 X10(3) UL (ref 0.1–1)
MONOCYTES NFR BLD AUTO: 7.7 %
NEUTROPHILS # BLD AUTO: 6.23 X10 (3) UL (ref 1.5–7.7)
NEUTROPHILS # BLD AUTO: 6.23 X10(3) UL (ref 1.5–7.7)
NEUTROPHILS NFR BLD AUTO: 63.8 %
OSMOLALITY SERPL CALC.SUM OF ELEC: 281 MOSM/KG (ref 275–295)
PLATELET # BLD AUTO: 305 10(3)UL (ref 150–450)
POTASSIUM SERPL-SCNC: 3.5 MMOL/L (ref 3.5–5.1)
PROT SERPL-MCNC: 7.2 G/DL (ref 6.4–8.2)
RBC # BLD AUTO: 4.58 X10(6)UL
RETICS # AUTO: 59.1 X10(3) UL (ref 22.5–147.5)
RETICS/RBC NFR AUTO: 1.3 %
SODIUM SERPL-SCNC: 136 MMOL/L (ref 136–145)
WBC # BLD AUTO: 9.8 X10(3) UL (ref 4–11)

## 2023-03-29 PROCEDURE — 99215 OFFICE O/P EST HI 40 MIN: CPT | Performed by: INTERNAL MEDICINE

## 2023-03-30 PROBLEM — O99.119 THROMBOPHILIA COMPLICATING PREGNANCY, ANTEPARTUM (HCC): Status: ACTIVE | Noted: 2023-03-30

## 2023-03-30 PROBLEM — D68.59 THROMBOPHILIA COMPLICATING PREGNANCY, ANTEPARTUM (HCC): Status: ACTIVE | Noted: 2023-03-30

## 2023-03-31 LAB — ADAMTS13 ACTIVITY: 93 %

## 2023-04-19 PROBLEM — Z78.9: Status: RESOLVED | Noted: 2021-09-30 | Resolved: 2023-04-19

## 2023-04-19 PROBLEM — Z31.69 PRE-CONCEPTION COUNSELING: Status: RESOLVED | Noted: 2021-09-30 | Resolved: 2023-04-19

## 2023-04-20 ENCOUNTER — ULTRASOUND ENCOUNTER (OUTPATIENT)
Dept: OBGYN CLINIC | Facility: CLINIC | Age: 33
End: 2023-04-20
Payer: COMMERCIAL

## 2023-04-20 ENCOUNTER — OFFICE VISIT (OUTPATIENT)
Dept: OBGYN CLINIC | Facility: CLINIC | Age: 33
End: 2023-04-20
Payer: COMMERCIAL

## 2023-04-20 VITALS
HEART RATE: 71 BPM | DIASTOLIC BLOOD PRESSURE: 70 MMHG | WEIGHT: 131.25 LBS | SYSTOLIC BLOOD PRESSURE: 110 MMHG | BODY MASS INDEX: 25 KG/M2

## 2023-04-20 DIAGNOSIS — N91.2 AMENORRHEA: Primary | ICD-10-CM

## 2023-04-20 PROCEDURE — 76856 US EXAM PELVIC COMPLETE: CPT | Performed by: OBSTETRICS & GYNECOLOGY

## 2023-04-20 PROCEDURE — 3078F DIAST BP <80 MM HG: CPT | Performed by: OBSTETRICS & GYNECOLOGY

## 2023-04-20 PROCEDURE — 3074F SYST BP LT 130 MM HG: CPT | Performed by: OBSTETRICS & GYNECOLOGY

## 2023-04-20 PROCEDURE — 99213 OFFICE O/P EST LOW 20 MIN: CPT | Performed by: OBSTETRICS & GYNECOLOGY

## 2023-04-21 NOTE — PROGRESS NOTES
Subjective:  Patient presents complaining of no menses. Positive home pregnancy test.  LMP 2/15/23. Previous pregnancy uncomplicated vaginal delivery. No hypertension,  labor or diabetes. No family history of any inheritable diseases or congenital birth defects on either side of family. Objective:  /70   Pulse 71   Wt 131 lb 4 oz (59.5 kg)   LMP 02/15/2023 (Exact Date)     Physical Examination:  General appearance: Well dressed, well nourished in no apparent distress  Neurologic/Psychiatric: Alert and oriented to person, place and time, mood normal, affect appropriate    Summary of Ultrasound Findings:  LMP 2/15/23  9w1day by LMP;  9w3day by ultrasound  Viable intrauterine pregnancy  Dates consistent with ultrasound. Final EDC:  23 by LMP consistent with ultrasound on 2023  Normal ovaries bilaterally     Assessment/Plan:  Amenorrhea- Normal dating   Hx of TTP-Heme following. MFM consult  Follow up 3-4 weeks for new OB visit. Prenatal vitamin with 0.4 mg folate, DHA. Optional prenatal screening tests reviewed including cfdna, carrier screen/CF, NT/first trimester screen, Quad/AFP, Level 2 ultrasound, amniocentesis/CVS.  Bleeding precautions provided. Diagnoses and all orders for this visit:    Amenorrhea  -     US PELVIS, ABDOMINAL GYNE EMG ONLY; Future    Other orders  -     OB/GYNE ULTRASOUND SCAN  -     OB/GYNE ULTRASOUND REPORT       Return for New OB Visit.

## 2023-04-26 ENCOUNTER — OFFICE VISIT (OUTPATIENT)
Dept: HEMATOLOGY/ONCOLOGY | Facility: HOSPITAL | Age: 33
End: 2023-04-26
Attending: INTERNAL MEDICINE
Payer: COMMERCIAL

## 2023-04-26 VITALS
DIASTOLIC BLOOD PRESSURE: 67 MMHG | TEMPERATURE: 98 F | WEIGHT: 132 LBS | SYSTOLIC BLOOD PRESSURE: 103 MMHG | BODY MASS INDEX: 24.92 KG/M2 | HEIGHT: 60.83 IN | OXYGEN SATURATION: 100 % | HEART RATE: 93 BPM | RESPIRATION RATE: 18 BRPM

## 2023-04-26 DIAGNOSIS — O99.119 THROMBOPHILIA COMPLICATING PREGNANCY, ANTEPARTUM (HCC): Primary | ICD-10-CM

## 2023-04-26 DIAGNOSIS — M31.19 TTP (THROMBOTIC THROMBOCYTOPENIC PURPURA) (HCC): ICD-10-CM

## 2023-04-26 DIAGNOSIS — D68.59 THROMBOPHILIA COMPLICATING PREGNANCY, ANTEPARTUM (HCC): Primary | ICD-10-CM

## 2023-04-26 LAB
ALBUMIN SERPL-MCNC: 3 G/DL (ref 3.4–5)
ALBUMIN/GLOB SERPL: 0.8 {RATIO} (ref 1–2)
ALP LIVER SERPL-CCNC: 46 U/L
ALT SERPL-CCNC: 21 U/L
ANION GAP SERPL CALC-SCNC: 7 MMOL/L (ref 0–18)
AST SERPL-CCNC: 15 U/L (ref 15–37)
BASOPHILS # BLD AUTO: 0.03 X10(3) UL (ref 0–0.2)
BASOPHILS NFR BLD AUTO: 0.3 %
BILIRUB SERPL-MCNC: 0.2 MG/DL (ref 0.1–2)
BUN BLD-MCNC: 6 MG/DL (ref 7–18)
CALCIUM BLD-MCNC: 8.8 MG/DL (ref 8.5–10.1)
CHLORIDE SERPL-SCNC: 106 MMOL/L (ref 98–112)
CO2 SERPL-SCNC: 22 MMOL/L (ref 21–32)
CREAT BLD-MCNC: 0.72 MG/DL
EOSINOPHIL # BLD AUTO: 0.07 X10(3) UL (ref 0–0.7)
EOSINOPHIL NFR BLD AUTO: 0.7 %
ERYTHROCYTE [DISTWIDTH] IN BLOOD BY AUTOMATED COUNT: 13.3 %
FASTING STATUS PATIENT QL REPORTED: NO
GFR SERPLBLD BASED ON 1.73 SQ M-ARVRAT: 112 ML/MIN/1.73M2 (ref 60–?)
GLOBULIN PLAS-MCNC: 3.7 G/DL (ref 2.8–4.4)
GLUCOSE BLD-MCNC: 142 MG/DL (ref 70–99)
HAPTOGLOB SERPL-MCNC: 95 MG/DL (ref 30–200)
HCT VFR BLD AUTO: 37 %
HGB BLD-MCNC: 13 G/DL
HGB RETIC QN AUTO: 34.4 PG (ref 28.2–36.6)
IMM GRANULOCYTES # BLD AUTO: 0.03 X10(3) UL (ref 0–1)
IMM GRANULOCYTES NFR BLD: 0.3 %
IMM RETICS NFR: 0.04 RATIO (ref 0.1–0.3)
LDH SERPL L TO P-CCNC: 130 U/L
LYMPHOCYTES # BLD AUTO: 2.61 X10(3) UL (ref 1–4)
LYMPHOCYTES NFR BLD AUTO: 24.8 %
MCH RBC QN AUTO: 28.3 PG (ref 26–34)
MCHC RBC AUTO-ENTMCNC: 35.1 G/DL (ref 31–37)
MCV RBC AUTO: 80.4 FL
MONOCYTES # BLD AUTO: 0.69 X10(3) UL (ref 0.1–1)
MONOCYTES NFR BLD AUTO: 6.6 %
NEUTROPHILS # BLD AUTO: 7.08 X10 (3) UL (ref 1.5–7.7)
NEUTROPHILS # BLD AUTO: 7.08 X10(3) UL (ref 1.5–7.7)
NEUTROPHILS NFR BLD AUTO: 67.3 %
OSMOLALITY SERPL CALC.SUM OF ELEC: 280 MOSM/KG (ref 275–295)
PLATELET # BLD AUTO: 287 10(3)UL (ref 150–450)
POTASSIUM SERPL-SCNC: 3.5 MMOL/L (ref 3.5–5.1)
PROT SERPL-MCNC: 6.7 G/DL (ref 6.4–8.2)
RBC # BLD AUTO: 4.6 X10(6)UL
RETICS # AUTO: 41.9 X10(3) UL (ref 22.5–147.5)
RETICS/RBC NFR AUTO: 0.9 %
SODIUM SERPL-SCNC: 135 MMOL/L (ref 136–145)
WBC # BLD AUTO: 10.5 X10(3) UL (ref 4–11)

## 2023-04-26 PROCEDURE — 99214 OFFICE O/P EST MOD 30 MIN: CPT | Performed by: INTERNAL MEDICINE

## 2023-04-30 LAB — ADAMTS13 ACTIVITY: >100 %

## 2023-05-16 ENCOUNTER — INITIAL PRENATAL (OUTPATIENT)
Dept: OBGYN CLINIC | Facility: CLINIC | Age: 33
End: 2023-05-16
Payer: COMMERCIAL

## 2023-05-16 VITALS
BODY MASS INDEX: 25.24 KG/M2 | WEIGHT: 132 LBS | SYSTOLIC BLOOD PRESSURE: 108 MMHG | HEIGHT: 60.75 IN | DIASTOLIC BLOOD PRESSURE: 68 MMHG | HEART RATE: 101 BPM

## 2023-05-16 DIAGNOSIS — O99.119 THROMBOPHILIA COMPLICATING PREGNANCY, ANTEPARTUM (HCC): ICD-10-CM

## 2023-05-16 DIAGNOSIS — D68.59 THROMBOPHILIA COMPLICATING PREGNANCY, ANTEPARTUM (HCC): ICD-10-CM

## 2023-05-16 DIAGNOSIS — Z34.90 PRENATAL CARE, ANTEPARTUM: Primary | ICD-10-CM

## 2023-05-16 LAB
GLUCOSE (URINE DIPSTICK): NEGATIVE MG/DL
MULTISTIX LOT#: NORMAL NUMERIC
PROTEIN (URINE DIPSTICK): NEGATIVE MG/DL

## 2023-05-16 PROCEDURE — 87591 N.GONORRHOEAE DNA AMP PROB: CPT | Performed by: NURSE PRACTITIONER

## 2023-05-16 PROCEDURE — 3008F BODY MASS INDEX DOCD: CPT | Performed by: NURSE PRACTITIONER

## 2023-05-16 PROCEDURE — 87086 URINE CULTURE/COLONY COUNT: CPT | Performed by: NURSE PRACTITIONER

## 2023-05-16 PROCEDURE — 87491 CHLMYD TRACH DNA AMP PROBE: CPT | Performed by: NURSE PRACTITIONER

## 2023-05-16 PROCEDURE — 81002 URINALYSIS NONAUTO W/O SCOPE: CPT | Performed by: NURSE PRACTITIONER

## 2023-05-16 PROCEDURE — 3078F DIAST BP <80 MM HG: CPT | Performed by: NURSE PRACTITIONER

## 2023-05-16 PROCEDURE — 3074F SYST BP LT 130 MM HG: CPT | Performed by: NURSE PRACTITIONER

## 2023-05-16 NOTE — PROGRESS NOTES
Here for initial prenatal visit with our group. 28year old  at [address] by LMP. Patient's last menstrual period was 02/15/2023 (exact date). Last pap smear was in  and it was normal.    Her pregnancy is complicated by a history of TTP. OB History    Para Term  AB Living   2 1 1 0 0 1   SAB IAB Ectopic Multiple Live Births   0 0 0 0 1      # Outcome Date GA Lbr Nestor/2nd Weight Sex Delivery Anes PTL Lv   2 Current            1 Term 19 39w5d 11:15 / 00:41 6 lb 12.6 oz (3.08 kg) F NORMAL SPONT EPI N LIT      Complications: Variable decelerations     ROS:  Reviewed, all wnl    Please see prenatal physical exam tab for initial OB physical exam  US: please see US tab    Prenatal course and care discussed with patient: visits, labs, HIV, AFP, sonograms, GL, GBS, restrictions. Pamphlets given. Genetic counseling done at prior visit regarding Cystic fibrosis and SMA carrier screen, First trimester screen/NT screen, CVS, QS + midtrimester sonogram for markers, amniocentesis. Pt would like the MaterniT 21. A/P:  1. Prenatal care, antepartum  - URINALYSIS NONAUTO W/O SCOPE (OB URISTIX)  - PRENATAL SCREEN; Future  - CBC WITH DIFFERENTIAL WITH PLATELET; Future  - HEPATITIS B SURFACE ANTIGEN; Future  - T PALLIDUM SCREENING CASCADE; Future  - RUBELLA, IGG; Future  - HIV AG AB COMBO; Future  - URINE CULTURE, ROUTINE; Future  - HCV ANTIBODY; Future  - CHLAMYDIA/GONOCOCCUS, ROMELIA; Future  - URINE CULTURE, ROUTINE  - CHLAMYDIA/GONOCOCCUS, ROMELIA    2.  Thrombophilia complicating pregnancy, antepartum ([facility] [address]  - OP REFERRAL TO  CONSULT  She will continue to see Hematology    LEXIS 4 weeks

## 2023-05-17 ENCOUNTER — LAB ENCOUNTER (OUTPATIENT)
Dept: LAB | Age: 33
End: 2023-05-17
Attending: NURSE PRACTITIONER
Payer: COMMERCIAL

## 2023-05-17 DIAGNOSIS — O99.111: ICD-10-CM

## 2023-05-17 DIAGNOSIS — D68.59: ICD-10-CM

## 2023-05-17 DIAGNOSIS — Z34.90 SUPERVISION OF NORMAL PREGNANCY: Primary | ICD-10-CM

## 2023-05-17 DIAGNOSIS — Z34.90 PRENATAL CARE, ANTEPARTUM: ICD-10-CM

## 2023-05-17 LAB
ANTIBODY SCREEN: NEGATIVE
BASOPHILS # BLD AUTO: 0.03 X10(3) UL (ref 0–0.2)
BASOPHILS NFR BLD AUTO: 0.3 %
C TRACH DNA SPEC QL NAA+PROBE: NEGATIVE
EOSINOPHIL # BLD AUTO: 0.1 X10(3) UL (ref 0–0.7)
EOSINOPHIL NFR BLD AUTO: 1.1 %
ERYTHROCYTE [DISTWIDTH] IN BLOOD BY AUTOMATED COUNT: 13.4 %
HBV SURFACE AG SER-ACNC: <0.1 [IU]/L
HBV SURFACE AG SERPL QL IA: NONREACTIVE
HCT VFR BLD AUTO: 36.8 %
HCV AB SERPL QL IA: NONREACTIVE
HGB BLD-MCNC: 12.6 G/DL
IMM GRANULOCYTES # BLD AUTO: 0.04 X10(3) UL (ref 0–1)
IMM GRANULOCYTES NFR BLD: 0.4 %
LYMPHOCYTES # BLD AUTO: 2.58 X10(3) UL (ref 1–4)
LYMPHOCYTES NFR BLD AUTO: 27.2 %
MCH RBC QN AUTO: 28 PG (ref 26–34)
MCHC RBC AUTO-ENTMCNC: 34.2 G/DL (ref 31–37)
MCV RBC AUTO: 81.8 FL
MONOCYTES # BLD AUTO: 0.73 X10(3) UL (ref 0.1–1)
MONOCYTES NFR BLD AUTO: 7.7 %
N GONORRHOEA DNA SPEC QL NAA+PROBE: NEGATIVE
NEUTROPHILS # BLD AUTO: 5.99 X10 (3) UL (ref 1.5–7.7)
NEUTROPHILS # BLD AUTO: 5.99 X10(3) UL (ref 1.5–7.7)
NEUTROPHILS NFR BLD AUTO: 63.3 %
PLATELET # BLD AUTO: 298 10(3)UL (ref 150–450)
RBC # BLD AUTO: 4.5 X10(6)UL
RH BLOOD TYPE: POSITIVE
RUBV IGG SER QL: POSITIVE
RUBV IGG SER-ACNC: 52.3 IU/ML (ref 10–?)
T PALLIDUM AB SER QL IA: NONREACTIVE
WBC # BLD AUTO: 9.5 X10(3) UL (ref 4–11)

## 2023-05-17 PROCEDURE — 86803 HEPATITIS C AB TEST: CPT

## 2023-05-17 PROCEDURE — 86900 BLOOD TYPING SEROLOGIC ABO: CPT

## 2023-05-17 PROCEDURE — 87340 HEPATITIS B SURFACE AG IA: CPT

## 2023-05-17 PROCEDURE — 36415 COLL VENOUS BLD VENIPUNCTURE: CPT

## 2023-05-17 PROCEDURE — 87389 HIV-1 AG W/HIV-1&-2 AB AG IA: CPT

## 2023-05-17 PROCEDURE — 86901 BLOOD TYPING SEROLOGIC RH(D): CPT

## 2023-05-17 PROCEDURE — 86780 TREPONEMA PALLIDUM: CPT

## 2023-05-17 PROCEDURE — 85025 COMPLETE CBC W/AUTO DIFF WBC: CPT

## 2023-05-17 PROCEDURE — 86762 RUBELLA ANTIBODY: CPT

## 2023-05-17 PROCEDURE — 86850 RBC ANTIBODY SCREEN: CPT

## 2023-05-24 ENCOUNTER — OFFICE VISIT (OUTPATIENT)
Dept: HEMATOLOGY/ONCOLOGY | Facility: HOSPITAL | Age: 33
End: 2023-05-24
Attending: INTERNAL MEDICINE
Payer: COMMERCIAL

## 2023-05-24 VITALS
BODY MASS INDEX: 24.99 KG/M2 | SYSTOLIC BLOOD PRESSURE: 116 MMHG | TEMPERATURE: 99 F | HEART RATE: 111 BPM | HEIGHT: 60.83 IN | RESPIRATION RATE: 18 BRPM | OXYGEN SATURATION: 98 % | WEIGHT: 132.38 LBS | DIASTOLIC BLOOD PRESSURE: 76 MMHG

## 2023-05-24 DIAGNOSIS — M31.19 TTP (THROMBOTIC THROMBOCYTOPENIC PURPURA) (HCC): Primary | ICD-10-CM

## 2023-05-24 DIAGNOSIS — O99.119 THROMBOPHILIA COMPLICATING PREGNANCY, ANTEPARTUM (HCC): ICD-10-CM

## 2023-05-24 DIAGNOSIS — D68.59 THROMBOPHILIA COMPLICATING PREGNANCY, ANTEPARTUM (HCC): ICD-10-CM

## 2023-05-24 LAB
ALBUMIN SERPL-MCNC: 2.8 G/DL (ref 3.4–5)
ALBUMIN/GLOB SERPL: 0.7 {RATIO} (ref 1–2)
ALP LIVER SERPL-CCNC: 52 U/L
ALT SERPL-CCNC: 26 U/L
ANION GAP SERPL CALC-SCNC: 10 MMOL/L (ref 0–18)
AST SERPL-CCNC: 20 U/L (ref 15–37)
BASOPHILS # BLD AUTO: 0.03 X10(3) UL (ref 0–0.2)
BASOPHILS NFR BLD AUTO: 0.2 %
BILIRUB SERPL-MCNC: 0.2 MG/DL (ref 0.1–2)
BUN BLD-MCNC: 7 MG/DL (ref 7–18)
CALCIUM BLD-MCNC: 8.6 MG/DL (ref 8.5–10.1)
CHLORIDE SERPL-SCNC: 106 MMOL/L (ref 98–112)
CO2 SERPL-SCNC: 18 MMOL/L (ref 21–32)
CREAT BLD-MCNC: 0.67 MG/DL
EOSINOPHIL # BLD AUTO: 0.05 X10(3) UL (ref 0–0.7)
EOSINOPHIL NFR BLD AUTO: 0.4 %
ERYTHROCYTE [DISTWIDTH] IN BLOOD BY AUTOMATED COUNT: 13.2 %
FASTING STATUS PATIENT QL REPORTED: NO
GFR SERPLBLD BASED ON 1.73 SQ M-ARVRAT: 118 ML/MIN/1.73M2 (ref 60–?)
GLOBULIN PLAS-MCNC: 3.9 G/DL (ref 2.8–4.4)
GLUCOSE BLD-MCNC: 170 MG/DL (ref 70–99)
HAPTOGLOB SERPL-MCNC: 107 MG/DL (ref 30–200)
HCT VFR BLD AUTO: 36.2 %
HGB BLD-MCNC: 12.3 G/DL
HGB RETIC QN AUTO: 31.8 PG (ref 28.2–36.6)
IMM GRANULOCYTES # BLD AUTO: 0.03 X10(3) UL (ref 0–1)
IMM GRANULOCYTES NFR BLD: 0.2 %
IMM RETICS NFR: 0.05 RATIO (ref 0.1–0.3)
LDH SERPL L TO P-CCNC: 136 U/L
LYMPHOCYTES # BLD AUTO: 2.24 X10(3) UL (ref 1–4)
LYMPHOCYTES NFR BLD AUTO: 18.3 %
MCH RBC QN AUTO: 27.9 PG (ref 26–34)
MCHC RBC AUTO-ENTMCNC: 34 G/DL (ref 31–37)
MCV RBC AUTO: 82.1 FL
MONOCYTES # BLD AUTO: 0.49 X10(3) UL (ref 0.1–1)
MONOCYTES NFR BLD AUTO: 4 %
NEUTROPHILS # BLD AUTO: 9.39 X10 (3) UL (ref 1.5–7.7)
NEUTROPHILS # BLD AUTO: 9.39 X10(3) UL (ref 1.5–7.7)
NEUTROPHILS NFR BLD AUTO: 76.9 %
OSMOLALITY SERPL CALC.SUM OF ELEC: 280 MOSM/KG (ref 275–295)
PLATELET # BLD AUTO: 286 10(3)UL (ref 150–450)
POTASSIUM SERPL-SCNC: 3.3 MMOL/L (ref 3.5–5.1)
PROT SERPL-MCNC: 6.7 G/DL (ref 6.4–8.2)
RBC # BLD AUTO: 4.41 X10(6)UL
RETICS # AUTO: 33.5 X10(3) UL (ref 22.5–147.5)
RETICS/RBC NFR AUTO: 0.8 %
SODIUM SERPL-SCNC: 134 MMOL/L (ref 136–145)
WBC # BLD AUTO: 12.2 X10(3) UL (ref 4–11)

## 2023-05-24 PROCEDURE — 99214 OFFICE O/P EST MOD 30 MIN: CPT | Performed by: INTERNAL MEDICINE

## 2023-05-26 LAB — ADAMTS13 ACTIVITY: >100 %

## 2023-06-01 ENCOUNTER — PATIENT MESSAGE (OUTPATIENT)
Dept: OBGYN CLINIC | Facility: CLINIC | Age: 33
End: 2023-06-01

## 2023-06-01 NOTE — TELEPHONE ENCOUNTER
Report printed from portal.    Normal/negative [id number] results  Gender info in routing message. Given to Betsy Carrillo in [facility] for review.

## 2023-06-02 ENCOUNTER — MED REC SCAN ONLY (OUTPATIENT)
Dept: OBGYN CLINIC | Facility: CLINIC | Age: 33
End: 2023-06-02

## 2023-06-10 ENCOUNTER — ROUTINE PRENATAL (OUTPATIENT)
Dept: OBGYN CLINIC | Facility: CLINIC | Age: 33
End: 2023-06-10
Payer: COMMERCIAL

## 2023-06-10 VITALS
WEIGHT: 134 LBS | HEART RATE: 89 BPM | DIASTOLIC BLOOD PRESSURE: 60 MMHG | BODY MASS INDEX: 25 KG/M2 | SYSTOLIC BLOOD PRESSURE: 98 MMHG

## 2023-06-10 DIAGNOSIS — Z34.80 PRENATAL CARE OF MULTIGRAVIDA, ANTEPARTUM: Primary | ICD-10-CM

## 2023-06-10 PROCEDURE — 3078F DIAST BP <80 MM HG: CPT | Performed by: NURSE PRACTITIONER

## 2023-06-10 PROCEDURE — 3074F SYST BP LT 130 MM HG: CPT | Performed by: NURSE PRACTITIONER

## 2023-06-10 PROCEDURE — 81002 URINALYSIS NONAUTO W/O SCOPE: CPT | Performed by: NURSE PRACTITIONER

## 2023-06-10 NOTE — PROGRESS NOTES
LEXIS  FM- none yet  Doing well, some tachycardia with activity and occasional flutter. Advised she call with any recurrent concerns. Encouraged increased hydration. No complaints.  No LOF/VB/uctx  Genetic testing- normal MaterniT 21, AFP declined at this time  Anatomy Scan with MFM 20 weeks  LEXIS 4 weeks

## 2023-06-10 NOTE — PATIENT INSTRUCTIONS
[facility], [address] [facility]  [place], [address] [facility]  [phone]

## 2023-07-06 ENCOUNTER — ROUTINE PRENATAL (OUTPATIENT)
Dept: OBGYN CLINIC | Facility: CLINIC | Age: 33
End: 2023-07-06
Payer: COMMERCIAL

## 2023-07-06 ENCOUNTER — APPOINTMENT (OUTPATIENT)
Dept: HEMATOLOGY/ONCOLOGY | Facility: HOSPITAL | Age: 33
End: 2023-07-06
Attending: INTERNAL MEDICINE
Payer: COMMERCIAL

## 2023-07-06 VITALS
WEIGHT: 136.81 LBS | BODY MASS INDEX: 26 KG/M2 | SYSTOLIC BLOOD PRESSURE: 110 MMHG | DIASTOLIC BLOOD PRESSURE: 56 MMHG | HEART RATE: 107 BPM

## 2023-07-06 DIAGNOSIS — Z34.80 PRENATAL CARE OF MULTIGRAVIDA, ANTEPARTUM: Primary | ICD-10-CM

## 2023-07-06 LAB
GLUCOSE (URINE DIPSTICK): NEGATIVE MG/DL
MULTISTIX LOT#: NORMAL NUMERIC

## 2023-07-06 PROCEDURE — 3074F SYST BP LT 130 MM HG: CPT | Performed by: OBSTETRICS & GYNECOLOGY

## 2023-07-06 PROCEDURE — 81002 URINALYSIS NONAUTO W/O SCOPE: CPT | Performed by: OBSTETRICS & GYNECOLOGY

## 2023-07-06 PROCEDURE — 3078F DIAST BP <80 MM HG: CPT | Performed by: OBSTETRICS & GYNECOLOGY

## 2023-07-06 NOTE — PROGRESS NOTES
20w1d seen for routine OB visit. Denies ctx, lof, vb. Reports good FM. 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

## 2023-07-11 ENCOUNTER — ULTRASOUND ENCOUNTER (OUTPATIENT)
Dept: PERINATAL CARE | Facility: HOSPITAL | Age: 33
End: 2023-07-11
Attending: OBSTETRICS & GYNECOLOGY
Payer: COMMERCIAL

## 2023-07-11 ENCOUNTER — OFFICE VISIT (OUTPATIENT)
Dept: PERINATAL CARE | Facility: HOSPITAL | Age: 33
End: 2023-07-11
Attending: NURSE PRACTITIONER
Payer: COMMERCIAL

## 2023-07-11 VITALS
HEART RATE: 123 BPM | SYSTOLIC BLOOD PRESSURE: 96 MMHG | WEIGHT: 138 LBS | DIASTOLIC BLOOD PRESSURE: 65 MMHG | HEIGHT: 61 IN | BODY MASS INDEX: 26.06 KG/M2

## 2023-07-11 DIAGNOSIS — D69.6 THROMBOCYTOPENIA (HCC): ICD-10-CM

## 2023-07-11 DIAGNOSIS — D69.6 THROMBOCYTOPENIA (HCC): Primary | ICD-10-CM

## 2023-07-11 DIAGNOSIS — D68.59 THROMBOPHILIA COMPLICATING PREGNANCY, ANTEPARTUM (HCC): ICD-10-CM

## 2023-07-11 DIAGNOSIS — O99.119 THROMBOPHILIA COMPLICATING PREGNANCY, ANTEPARTUM (HCC): ICD-10-CM

## 2023-07-11 PROCEDURE — 76811 OB US DETAILED SNGL FETUS: CPT | Performed by: OBSTETRICS & GYNECOLOGY

## 2023-07-13 ENCOUNTER — OFFICE VISIT (OUTPATIENT)
Dept: HEMATOLOGY/ONCOLOGY | Facility: HOSPITAL | Age: 33
End: 2023-07-13
Attending: INTERNAL MEDICINE
Payer: COMMERCIAL

## 2023-07-13 VITALS
HEIGHT: 60.83 IN | SYSTOLIC BLOOD PRESSURE: 103 MMHG | HEART RATE: 96 BPM | OXYGEN SATURATION: 98 % | DIASTOLIC BLOOD PRESSURE: 59 MMHG | BODY MASS INDEX: 25.68 KG/M2 | WEIGHT: 136 LBS | TEMPERATURE: 98 F | RESPIRATION RATE: 16 BRPM

## 2023-07-13 DIAGNOSIS — D64.9 NORMOCYTIC ANEMIA: ICD-10-CM

## 2023-07-13 DIAGNOSIS — M31.19 TTP (THROMBOTIC THROMBOCYTOPENIC PURPURA) (HCC): Primary | ICD-10-CM

## 2023-07-13 DIAGNOSIS — D68.59 THROMBOPHILIA COMPLICATING PREGNANCY, ANTEPARTUM (HCC): ICD-10-CM

## 2023-07-13 DIAGNOSIS — O99.119 THROMBOPHILIA COMPLICATING PREGNANCY, ANTEPARTUM (HCC): ICD-10-CM

## 2023-07-13 LAB
ALBUMIN SERPL-MCNC: 2.6 G/DL (ref 3.4–5)
ALBUMIN/GLOB SERPL: 0.6 {RATIO} (ref 1–2)
ALP LIVER SERPL-CCNC: 54 U/L
ALT SERPL-CCNC: 16 U/L
ANION GAP SERPL CALC-SCNC: 8 MMOL/L (ref 0–18)
AST SERPL-CCNC: 15 U/L (ref 15–37)
BASOPHILS # BLD AUTO: 0.03 X10(3) UL (ref 0–0.2)
BASOPHILS NFR BLD AUTO: 0.3 %
BILIRUB SERPL-MCNC: 0.2 MG/DL (ref 0.1–2)
BUN BLD-MCNC: 8 MG/DL (ref 7–18)
CALCIUM BLD-MCNC: 8.8 MG/DL (ref 8.5–10.1)
CHLORIDE SERPL-SCNC: 107 MMOL/L (ref 98–112)
CO2 SERPL-SCNC: 20 MMOL/L (ref 21–32)
CREAT BLD-MCNC: 0.47 MG/DL
EOSINOPHIL # BLD AUTO: 0.1 X10(3) UL (ref 0–0.7)
EOSINOPHIL NFR BLD AUTO: 0.9 %
ERYTHROCYTE [DISTWIDTH] IN BLOOD BY AUTOMATED COUNT: 13.6 %
GFR SERPLBLD BASED ON 1.73 SQ M-ARVRAT: 128 ML/MIN/1.73M2 (ref 60–?)
GLOBULIN PLAS-MCNC: 4.1 G/DL (ref 2.8–4.4)
GLUCOSE BLD-MCNC: 112 MG/DL (ref 70–99)
HAPTOGLOB SERPL-MCNC: 94.5 MG/DL (ref 30–200)
HCT VFR BLD AUTO: 33.9 %
HGB BLD-MCNC: 11.5 G/DL
HGB RETIC QN AUTO: 33.2 PG (ref 28.2–36.6)
IMM GRANULOCYTES # BLD AUTO: 0.08 X10(3) UL (ref 0–1)
IMM GRANULOCYTES NFR BLD: 0.8 %
IMM RETICS NFR: 0.08 RATIO (ref 0.1–0.3)
LDH SERPL L TO P-CCNC: 125 U/L
LYMPHOCYTES # BLD AUTO: 2.29 X10(3) UL (ref 1–4)
LYMPHOCYTES NFR BLD AUTO: 21.6 %
MCH RBC QN AUTO: 28.1 PG (ref 26–34)
MCHC RBC AUTO-ENTMCNC: 33.9 G/DL (ref 31–37)
MCV RBC AUTO: 82.9 FL
MONOCYTES # BLD AUTO: 0.78 X10(3) UL (ref 0.1–1)
MONOCYTES NFR BLD AUTO: 7.4 %
NEUTROPHILS # BLD AUTO: 7.31 X10 (3) UL (ref 1.5–7.7)
NEUTROPHILS # BLD AUTO: 7.31 X10(3) UL (ref 1.5–7.7)
NEUTROPHILS NFR BLD AUTO: 69 %
OSMOLALITY SERPL CALC.SUM OF ELEC: 279 MOSM/KG (ref 275–295)
PLATELET # BLD AUTO: 298 10(3)UL (ref 150–450)
POTASSIUM SERPL-SCNC: 3.6 MMOL/L (ref 3.5–5.1)
PROT SERPL-MCNC: 6.7 G/DL (ref 6.4–8.2)
RBC # BLD AUTO: 4.09 X10(6)UL
RETICS # AUTO: 53.2 X10(3) UL (ref 22.5–147.5)
RETICS/RBC NFR AUTO: 1.3 %
SODIUM SERPL-SCNC: 135 MMOL/L (ref 136–145)
WBC # BLD AUTO: 10.6 X10(3) UL (ref 4–11)

## 2023-07-13 PROCEDURE — 99214 OFFICE O/P EST MOD 30 MIN: CPT | Performed by: INTERNAL MEDICINE

## 2023-07-13 NOTE — PROGRESS NOTES
Education Record    Learner:  Patient    Disease / Diagnosis:TTP    Barriers / Limitations:  None   Comments:    Method:  Discussion   Comments:    General Topics:  Plan of care reviewed   Comments:    Outcome:  Shows understanding   Comments:   Pt 21 weeks pregnant  Feeling well. No bruising. Some tiredness, but not bad.

## 2023-07-16 LAB — ADAMTS13 ACTIVITY: >100 %

## 2023-08-05 ENCOUNTER — LAB ENCOUNTER (OUTPATIENT)
Dept: LAB | Facility: HOSPITAL | Age: 33
End: 2023-08-05
Attending: INTERNAL MEDICINE
Payer: COMMERCIAL

## 2023-08-05 ENCOUNTER — ROUTINE PRENATAL (OUTPATIENT)
Dept: OBGYN CLINIC | Facility: CLINIC | Age: 33
End: 2023-08-05
Payer: COMMERCIAL

## 2023-08-05 VITALS
WEIGHT: 140.63 LBS | DIASTOLIC BLOOD PRESSURE: 58 MMHG | SYSTOLIC BLOOD PRESSURE: 104 MMHG | BODY MASS INDEX: 27 KG/M2 | HEART RATE: 97 BPM

## 2023-08-05 DIAGNOSIS — Z34.80 PRENATAL CARE OF MULTIGRAVIDA, ANTEPARTUM: ICD-10-CM

## 2023-08-05 DIAGNOSIS — O99.810 ABNORMAL GLUCOSE TOLERANCE IN PREGNANCY: Primary | ICD-10-CM

## 2023-08-05 DIAGNOSIS — Z34.90 PRENATAL CARE, ANTEPARTUM: Primary | ICD-10-CM

## 2023-08-05 LAB
BASOPHILS # BLD AUTO: 0.04 X10(3) UL (ref 0–0.2)
BASOPHILS NFR BLD AUTO: 0.4 %
EOSINOPHIL # BLD AUTO: 0.08 X10(3) UL (ref 0–0.7)
EOSINOPHIL NFR BLD AUTO: 0.9 %
ERYTHROCYTE [DISTWIDTH] IN BLOOD BY AUTOMATED COUNT: 13.7 %
GLUCOSE (URINE DIPSTICK): NEGATIVE MG/DL
GLUCOSE 1H P GLC SERPL-MCNC: 179 MG/DL
HCT VFR BLD AUTO: 33.5 %
HGB BLD-MCNC: 11.2 G/DL
IMM GRANULOCYTES # BLD AUTO: 0.08 X10(3) UL (ref 0–1)
IMM GRANULOCYTES NFR BLD: 0.9 %
LYMPHOCYTES # BLD AUTO: 1.67 X10(3) UL (ref 1–4)
LYMPHOCYTES NFR BLD AUTO: 18.2 %
MCH RBC QN AUTO: 28.3 PG (ref 26–34)
MCHC RBC AUTO-ENTMCNC: 33.4 G/DL (ref 31–37)
MCV RBC AUTO: 84.6 FL
MONOCYTES # BLD AUTO: 0.54 X10(3) UL (ref 0.1–1)
MONOCYTES NFR BLD AUTO: 5.9 %
MULTISTIX LOT#: NORMAL NUMERIC
NEUTROPHILS # BLD AUTO: 6.75 X10 (3) UL (ref 1.5–7.7)
NEUTROPHILS # BLD AUTO: 6.75 X10(3) UL (ref 1.5–7.7)
NEUTROPHILS NFR BLD AUTO: 73.7 %
PLATELET # BLD AUTO: 258 10(3)UL (ref 150–450)
PROTEIN (URINE DIPSTICK): NEGATIVE MG/DL
RBC # BLD AUTO: 3.96 X10(6)UL
WBC # BLD AUTO: 9.2 X10(3) UL (ref 4–11)

## 2023-08-05 PROCEDURE — 81002 URINALYSIS NONAUTO W/O SCOPE: CPT | Performed by: OBSTETRICS & GYNECOLOGY

## 2023-08-05 PROCEDURE — 3074F SYST BP LT 130 MM HG: CPT | Performed by: OBSTETRICS & GYNECOLOGY

## 2023-08-05 PROCEDURE — 82950 GLUCOSE TEST: CPT

## 2023-08-05 PROCEDURE — 85025 COMPLETE CBC W/AUTO DIFF WBC: CPT

## 2023-08-05 PROCEDURE — 36415 COLL VENOUS BLD VENIPUNCTURE: CPT

## 2023-08-05 PROCEDURE — 3078F DIAST BP <80 MM HG: CPT | Performed by: OBSTETRICS & GYNECOLOGY

## 2023-08-05 NOTE — PROGRESS NOTES
24w3d seen for routine OB visit. Denies ctx, lof, vb. Reports good FM. Patient Active Problem List:     White matter changes     Intraventricular cyst of brain     Thrombocytopenia (HCC)     TTP (thrombotic thrombocytopenic purpura) (HCC)     Transaminitis     Otosclerosis of left ear     Hair loss     Paresthesias     Conductive hearing loss in left ear     History of migraine     Thrombophilia complicating pregnancy, antepartum (HCC)     Normocytic anemia       TW lb 9.6 oz (6.169 kg)   Depression Scale Total: 0 (2023  8:42 AM)      Gen: AAOx3, NAD  Resp: breathing comfortably  Abdomen: gravid, soft, nontender  Ext: nontender, no edema    Plan:  - reviewed MFM and heme/onc recommendations - see problem list   - scheduled f/u with MFM    - Heme   - 2T labs completed this morning  - tdap next visit  - return precautions reviewed    RTO in 4 week(s).

## 2023-08-12 ENCOUNTER — LAB ENCOUNTER (OUTPATIENT)
Dept: LAB | Facility: HOSPITAL | Age: 33
End: 2023-08-12
Attending: OBSTETRICS & GYNECOLOGY
Payer: COMMERCIAL

## 2023-08-12 DIAGNOSIS — O99.810 ABNORMAL GLUCOSE TOLERANCE IN PREGNANCY: ICD-10-CM

## 2023-08-12 LAB
GLUCOSE 1H P GLC SERPL-MCNC: 193 MG/DL
GLUCOSE 2H P GLC SERPL-MCNC: 184 MG/DL
GLUCOSE 3H P GLC SERPL-MCNC: 123 MG/DL (ref 70–140)
GLUCOSE P FAST SERPL-MCNC: 86 MG/DL

## 2023-08-12 PROCEDURE — 82951 GLUCOSE TOLERANCE TEST (GTT): CPT

## 2023-08-12 PROCEDURE — 36415 COLL VENOUS BLD VENIPUNCTURE: CPT

## 2023-08-12 PROCEDURE — 82952 GTT-ADDED SAMPLES: CPT

## 2023-08-13 DIAGNOSIS — O24.410 DIET CONTROLLED GESTATIONAL DIABETES MELLITUS (GDM) IN THIRD TRIMESTER: ICD-10-CM

## 2023-08-13 PROBLEM — O24.419 GESTATIONAL DIABETES MELLITUS (GDM) AFFECTING PREGNANCY (HCC): Status: ACTIVE | Noted: 2023-08-05

## 2023-08-13 PROBLEM — O24.419 GESTATIONAL DIABETES MELLITUS (GDM) AFFECTING PREGNANCY: Status: ACTIVE | Noted: 2023-08-05

## 2023-08-16 ENCOUNTER — TELEMEDICINE (OUTPATIENT)
Dept: ENDOCRINOLOGY CLINIC | Facility: CLINIC | Age: 33
End: 2023-08-16
Payer: COMMERCIAL

## 2023-08-16 DIAGNOSIS — O24.410 DIET CONTROLLED GESTATIONAL DIABETES MELLITUS (GDM) IN THIRD TRIMESTER: Primary | ICD-10-CM

## 2023-08-16 PROCEDURE — G0108 DIAB MANAGE TRN  PER INDIV: HCPCS | Performed by: DIETITIAN, REGISTERED

## 2023-08-16 RX ORDER — LANCETS
EACH MISCELLANEOUS
Qty: 200 EACH | Refills: 3 | Status: SHIPPED | OUTPATIENT
Start: 2023-08-16

## 2023-08-16 RX ORDER — PERPHENAZINE 16 MG/1
TABLET, FILM COATED ORAL
Qty: 200 STRIP | Refills: 3 | Status: SHIPPED | OUTPATIENT
Start: 2023-08-16

## 2023-08-16 RX ORDER — BLOOD-GLUCOSE METER
1 KIT MISCELLANEOUS AS DIRECTED
Qty: 1 KIT | Refills: 0 | Status: SHIPPED | OUTPATIENT
Start: 2023-08-16

## 2023-08-16 NOTE — PROGRESS NOTES
Jeramy [facility]   1990 was seen for Gestational Diabetes Counseling: Individual    Date: 2023  Referring Provider: Linda Humphrey MD  Start time: 1:00 pm End time: 1:42 pm    28year old female presents for gestational diabetes counseling via Video Visit. Assessment: LMP 02/15/2023 (Exact Date)   Weight: Wt Readings from Last 6 Encounters:  23 : 140 lb 9.6 oz  23 : 136 lb  23 : 138 lb  23 : 136 lb 12.8 oz  06/10/23 : 134 lb  23 : 132 lb 6.4 oz        PAULINO: 23  Gestation: 26w0d      GDM Screen:   Gestational Glucose Screen (23):  179 mg/dL  3 Hr GTT (23)  Fastin mg/dL  1hr- 193 mg/dL  2hr- 184 mg/dL  3hr- 123 mg/dL     History of GDM:  No  Family history of Type 2 Diabetes: Grandfather with diabetes, sister with history of gestational diabetes      Obtained usual diet history:   She tries to include 3 meals/day, can be 2 meals depending on work schedule. Is a vegetarian-no eggs, includes dairy. Notes she has been making modifications to her diet, including smaller portions with meal, no soda. Breakfast: Piece of bread with tea  Snack: Nuts  Lunch: Yesterday was around 11 am-Vegetables with 2 whole wheat roti. Sometimes has a sandwich. Snack: Cup of milk with biscuits or dry snacks  Dinner: 5-6 pm-2 pieces of chapati with vegetables or salad or fruit  Snack: Does not tend to include (notes goes to bed early)  Beverages Water, Poppi sparkling water with prebiotics and acv (low sugar), no soda    Current physical activity:   Walks in the evening, walks at work. Education:     GDM Overview:  Reviewed gestational diabetes as diagnosis including target blood glucose values. Benefits, risks, and management options for improving/maintaining glucose control to mother/baby discussed. Healthy Eating:  Discussed nutrition concepts for pregnancy/healthy eating and effects of food on BG value.   Timing of meals; what is a carbohydrate, protein, fat.  Taught: Carb counting, label reading, meal planning. Suggested minimal carb intake of 175 gm    Being Active:  Benefits and effects of activity on BG discussed. Reviewed types of recommended activity, duration, precautions, and when to call MD.    Monitoring:  Instructed on how to use glucose monitor/proper lancet disposal. Testing schedules are:   Fastin-95 mg/dL, Call MD is >105 md/dL twice in 1 week   2 Hour Post Prandial:  120 md/dL, Call MD if >140 md/dL twice in 1 week. Pt currently has a glucose meter:  No  Briefly instructed on using glucose meter with demo meter      Taking Medication:  Reviewed when medication might be indicated. Reducing Risk:  Effects of elevated blood glucose on mother/baby reviewed. Discussed management (hyperglycemia, hypoglycemia, sick day, other) and when to call provider. Post pregnancy management/prevention of Type 2 DM, and increased risk of having diabetes later in life reviewed. Healthy Coping:  Family involvement/social support encouraged. Identification of lifestyle behaviors willing to change discussed. Training Tools Provided:  Edward/[place] Diabetes and Pregnancy: A guide to self management  BG Log Sheets and Food Log Sheets    Recommendations:      1. Follow recommended meal plan. 2. Begin testing fasting glucose and 2 hour after meals   3. Bring glucose log sheet to next MD office visit. Bring glucose log sheet / food log sheet to next diabetes educator office visit. 4. Encouraged activity if no restrictions. 5. Encouraged Jeramy to contact the diabetes center with any questions or concerns. Glucose meter kit, test strips and lancets sent to preferred pharmacy. Patient verbalized understanding and has no further questions at this time. Emailed/written materials provided for all topics covered.     Scheduled pt to follow-up in 2 weeks with the Diabetes Center 2023     Chan Lantigua RDN, VALENTINA, Markos Lacey

## 2023-08-17 ENCOUNTER — OFFICE VISIT (OUTPATIENT)
Dept: HEMATOLOGY/ONCOLOGY | Facility: HOSPITAL | Age: 33
End: 2023-08-17
Attending: INTERNAL MEDICINE
Payer: COMMERCIAL

## 2023-08-17 VITALS
WEIGHT: 142.19 LBS | HEART RATE: 107 BPM | SYSTOLIC BLOOD PRESSURE: 111 MMHG | BODY MASS INDEX: 27 KG/M2 | TEMPERATURE: 99 F | DIASTOLIC BLOOD PRESSURE: 72 MMHG | OXYGEN SATURATION: 98 %

## 2023-08-17 DIAGNOSIS — O99.119 THROMBOPHILIA COMPLICATING PREGNANCY, ANTEPARTUM (HCC): ICD-10-CM

## 2023-08-17 DIAGNOSIS — D50.9 IRON DEFICIENCY ANEMIA DURING PREGNANCY: Primary | ICD-10-CM

## 2023-08-17 DIAGNOSIS — D64.9 NORMOCYTIC ANEMIA: ICD-10-CM

## 2023-08-17 DIAGNOSIS — O99.019 IRON DEFICIENCY ANEMIA DURING PREGNANCY: Primary | ICD-10-CM

## 2023-08-17 DIAGNOSIS — D68.59 THROMBOPHILIA COMPLICATING PREGNANCY, ANTEPARTUM (HCC): ICD-10-CM

## 2023-08-17 DIAGNOSIS — M31.19 TTP (THROMBOTIC THROMBOCYTOPENIC PURPURA) (HCC): ICD-10-CM

## 2023-08-17 LAB
ALBUMIN SERPL-MCNC: 2.6 G/DL (ref 3.4–5)
ALBUMIN/GLOB SERPL: 0.7 {RATIO} (ref 1–2)
ALP LIVER SERPL-CCNC: 58 U/L
ALT SERPL-CCNC: 15 U/L
ANION GAP SERPL CALC-SCNC: 9 MMOL/L (ref 0–18)
AST SERPL-CCNC: 13 U/L (ref 15–37)
BASOPHILS # BLD AUTO: 0.03 X10(3) UL (ref 0–0.2)
BASOPHILS NFR BLD AUTO: 0.3 %
BILIRUB SERPL-MCNC: 0.2 MG/DL (ref 0.1–2)
BUN BLD-MCNC: 8 MG/DL (ref 7–18)
CALCIUM BLD-MCNC: 8.7 MG/DL (ref 8.5–10.1)
CHLORIDE SERPL-SCNC: 107 MMOL/L (ref 98–112)
CO2 SERPL-SCNC: 18 MMOL/L (ref 21–32)
CREAT BLD-MCNC: 0.64 MG/DL
DEPRECATED HBV CORE AB SER IA-ACNC: 15.2 NG/ML
EGFRCR SERPLBLD CKD-EPI 2021: 119 ML/MIN/1.73M2 (ref 60–?)
EOSINOPHIL # BLD AUTO: 0.05 X10(3) UL (ref 0–0.7)
EOSINOPHIL NFR BLD AUTO: 0.4 %
ERYTHROCYTE [DISTWIDTH] IN BLOOD BY AUTOMATED COUNT: 13.5 %
FASTING STATUS PATIENT QL REPORTED: NO
GLOBULIN PLAS-MCNC: 4 G/DL (ref 2.8–4.4)
GLUCOSE BLD-MCNC: 128 MG/DL (ref 70–99)
HAPTOGLOB SERPL-MCNC: 81.2 MG/DL (ref 30–200)
HCT VFR BLD AUTO: 33.2 %
HGB BLD-MCNC: 11.4 G/DL
HGB RETIC QN AUTO: 34.4 PG (ref 28.2–36.6)
IMM GRANULOCYTES # BLD AUTO: 0.07 X10(3) UL (ref 0–1)
IMM GRANULOCYTES NFR BLD: 0.6 %
IMM RETICS NFR: 0.09 RATIO (ref 0.1–0.3)
IRON SATN MFR SERPL: 17 %
IRON SERPL-MCNC: 96 UG/DL
LDH SERPL L TO P-CCNC: 126 U/L
LYMPHOCYTES # BLD AUTO: 1.82 X10(3) UL (ref 1–4)
LYMPHOCYTES NFR BLD AUTO: 16 %
MCH RBC QN AUTO: 28.3 PG (ref 26–34)
MCHC RBC AUTO-ENTMCNC: 34.3 G/DL (ref 31–37)
MCV RBC AUTO: 82.4 FL
MONOCYTES # BLD AUTO: 0.63 X10(3) UL (ref 0.1–1)
MONOCYTES NFR BLD AUTO: 5.5 %
NEUTROPHILS # BLD AUTO: 8.76 X10 (3) UL (ref 1.5–7.7)
NEUTROPHILS # BLD AUTO: 8.76 X10(3) UL (ref 1.5–7.7)
NEUTROPHILS NFR BLD AUTO: 77.2 %
OSMOLALITY SERPL CALC.SUM OF ELEC: 278 MOSM/KG (ref 275–295)
PLATELET # BLD AUTO: 285 10(3)UL (ref 150–450)
POTASSIUM SERPL-SCNC: 3.8 MMOL/L (ref 3.5–5.1)
PROT SERPL-MCNC: 6.6 G/DL (ref 6.4–8.2)
RBC # BLD AUTO: 4.03 X10(6)UL
RETICS # AUTO: 63.7 X10(3) UL (ref 22.5–147.5)
RETICS/RBC NFR AUTO: 1.6 %
SODIUM SERPL-SCNC: 134 MMOL/L (ref 136–145)
TIBC SERPL-MCNC: 578 UG/DL (ref 240–450)
TRANSFERRIN SERPL-MCNC: 388 MG/DL (ref 200–360)
WBC # BLD AUTO: 11.4 X10(3) UL (ref 4–11)

## 2023-08-17 PROCEDURE — 99215 OFFICE O/P EST HI 40 MIN: CPT | Performed by: INTERNAL MEDICINE

## 2023-08-17 NOTE — PROGRESS NOTES
Education Record     Learner:  Patient     Disease / Diagnosis: TTP     Barriers / Limitations:  None                Comments:     Method:  Discussion                Comments:     General Topics:  Plan of care reviewed                Comments:     Outcome:  Shows understanding                Comments: 1 month MD f/up. Pt states she is 26 weeks pregnant. Reports fatigue. States she is checking her sugars for gestational diabetes.

## 2023-08-19 LAB — ADAMTS13 ACTIVITY: 85.2 %

## 2023-08-29 ENCOUNTER — PATIENT MESSAGE (OUTPATIENT)
Dept: OBGYN CLINIC | Facility: CLINIC | Age: 33
End: 2023-08-29

## 2023-08-30 ENCOUNTER — TELEMEDICINE (OUTPATIENT)
Dept: ENDOCRINOLOGY CLINIC | Facility: CLINIC | Age: 33
End: 2023-08-30
Payer: COMMERCIAL

## 2023-08-30 DIAGNOSIS — O24.410 DIET CONTROLLED GESTATIONAL DIABETES MELLITUS (GDM) IN THIRD TRIMESTER: Primary | ICD-10-CM

## 2023-09-02 ENCOUNTER — ROUTINE PRENATAL (OUTPATIENT)
Dept: OBGYN CLINIC | Facility: CLINIC | Age: 33
End: 2023-09-02
Payer: COMMERCIAL

## 2023-09-02 VITALS
WEIGHT: 141.25 LBS | BODY MASS INDEX: 27 KG/M2 | DIASTOLIC BLOOD PRESSURE: 62 MMHG | SYSTOLIC BLOOD PRESSURE: 100 MMHG | HEART RATE: 104 BPM

## 2023-09-02 DIAGNOSIS — Z3A.28 28 WEEKS GESTATION OF PREGNANCY: Primary | ICD-10-CM

## 2023-09-02 PROBLEM — Z86.69 HISTORY OF MIGRAINE: Status: RESOLVED | Noted: 2023-01-03 | Resolved: 2023-09-02

## 2023-09-02 PROBLEM — H80.92 OTOSCLEROSIS OF LEFT EAR: Status: RESOLVED | Noted: 2021-01-29 | Resolved: 2023-09-02

## 2023-09-02 PROBLEM — R20.2 PARESTHESIAS: Status: RESOLVED | Noted: 2021-07-08 | Resolved: 2023-09-02

## 2023-09-02 PROBLEM — L65.9 HAIR LOSS: Status: RESOLVED | Noted: 2021-04-08 | Resolved: 2023-09-02

## 2023-09-02 PROBLEM — H90.12 CONDUCTIVE HEARING LOSS IN LEFT EAR: Status: RESOLVED | Noted: 2022-11-17 | Resolved: 2023-09-02

## 2023-09-02 PROCEDURE — 90715 TDAP VACCINE 7 YRS/> IM: CPT | Performed by: STUDENT IN AN ORGANIZED HEALTH CARE EDUCATION/TRAINING PROGRAM

## 2023-09-02 PROCEDURE — 3074F SYST BP LT 130 MM HG: CPT | Performed by: STUDENT IN AN ORGANIZED HEALTH CARE EDUCATION/TRAINING PROGRAM

## 2023-09-02 PROCEDURE — 90471 IMMUNIZATION ADMIN: CPT | Performed by: STUDENT IN AN ORGANIZED HEALTH CARE EDUCATION/TRAINING PROGRAM

## 2023-09-02 PROCEDURE — 3078F DIAST BP <80 MM HG: CPT | Performed by: STUDENT IN AN ORGANIZED HEALTH CARE EDUCATION/TRAINING PROGRAM

## 2023-09-05 ENCOUNTER — ULTRASOUND ENCOUNTER (OUTPATIENT)
Dept: PERINATAL CARE | Facility: HOSPITAL | Age: 33
End: 2023-09-05
Attending: OBSTETRICS & GYNECOLOGY
Payer: COMMERCIAL

## 2023-09-05 VITALS
SYSTOLIC BLOOD PRESSURE: 98 MMHG | WEIGHT: 142 LBS | DIASTOLIC BLOOD PRESSURE: 60 MMHG | BODY MASS INDEX: 26.81 KG/M2 | HEIGHT: 61 IN | HEART RATE: 100 BPM

## 2023-09-05 DIAGNOSIS — D69.6 THROMBOCYTOPENIA (HCC): Primary | ICD-10-CM

## 2023-09-05 DIAGNOSIS — D69.6 THROMBOCYTOPENIA (HCC): ICD-10-CM

## 2023-09-05 DIAGNOSIS — O09.623 HIGH-RISK PREGNANCY, YOUNG MULTIGRAVIDA IN THIRD TRIMESTER: ICD-10-CM

## 2023-09-05 DIAGNOSIS — O24.419 GESTATIONAL DIABETES MELLITUS (GDM) AFFECTING PREGNANCY: ICD-10-CM

## 2023-09-05 PROCEDURE — 76816 OB US FOLLOW-UP PER FETUS: CPT | Performed by: OBSTETRICS & GYNECOLOGY

## 2023-09-05 RX ORDER — LORATADINE 10 MG/1
10 TABLET ORAL DAILY
COMMUNITY

## 2023-09-08 ENCOUNTER — TELEPHONE (OUTPATIENT)
Dept: OBGYN CLINIC | Facility: CLINIC | Age: 33
End: 2023-09-08

## 2023-09-08 NOTE — TELEPHONE ENCOUNTER
Received backbrace orders from [facility]. [facility] signed and I faxed back.  Copy in plfd

## 2023-09-15 ENCOUNTER — ROUTINE PRENATAL (OUTPATIENT)
Dept: OBGYN CLINIC | Facility: CLINIC | Age: 33
End: 2023-09-15
Payer: COMMERCIAL

## 2023-09-15 VITALS
HEART RATE: 100 BPM | BODY MASS INDEX: 27 KG/M2 | DIASTOLIC BLOOD PRESSURE: 64 MMHG | WEIGHT: 142 LBS | SYSTOLIC BLOOD PRESSURE: 102 MMHG

## 2023-09-15 DIAGNOSIS — Z3A.30 30 WEEKS GESTATION OF PREGNANCY: Primary | ICD-10-CM

## 2023-09-15 PROCEDURE — 3074F SYST BP LT 130 MM HG: CPT | Performed by: STUDENT IN AN ORGANIZED HEALTH CARE EDUCATION/TRAINING PROGRAM

## 2023-09-15 PROCEDURE — 3078F DIAST BP <80 MM HG: CPT | Performed by: STUDENT IN AN ORGANIZED HEALTH CARE EDUCATION/TRAINING PROGRAM

## 2023-09-18 ENCOUNTER — APPOINTMENT (OUTPATIENT)
Dept: HEMATOLOGY/ONCOLOGY | Facility: HOSPITAL | Age: 33
End: 2023-09-18
Attending: INTERNAL MEDICINE
Payer: COMMERCIAL

## 2023-09-21 ENCOUNTER — OFFICE VISIT (OUTPATIENT)
Dept: HEMATOLOGY/ONCOLOGY | Facility: HOSPITAL | Age: 33
End: 2023-09-21
Attending: INTERNAL MEDICINE
Payer: COMMERCIAL

## 2023-09-21 VITALS
SYSTOLIC BLOOD PRESSURE: 106 MMHG | BODY MASS INDEX: 27 KG/M2 | TEMPERATURE: 98 F | DIASTOLIC BLOOD PRESSURE: 70 MMHG | HEART RATE: 102 BPM | WEIGHT: 142.81 LBS | OXYGEN SATURATION: 99 %

## 2023-09-21 VITALS — HEART RATE: 98 BPM | DIASTOLIC BLOOD PRESSURE: 60 MMHG | SYSTOLIC BLOOD PRESSURE: 97 MMHG

## 2023-09-21 DIAGNOSIS — M31.19 TTP (THROMBOTIC THROMBOCYTOPENIC PURPURA) (HCC): ICD-10-CM

## 2023-09-21 DIAGNOSIS — D64.9 NORMOCYTIC ANEMIA: ICD-10-CM

## 2023-09-21 DIAGNOSIS — Z3A.28 28 WEEKS GESTATION OF PREGNANCY: ICD-10-CM

## 2023-09-21 DIAGNOSIS — D50.9 IRON DEFICIENCY ANEMIA DURING PREGNANCY: Primary | ICD-10-CM

## 2023-09-21 DIAGNOSIS — O99.019 IRON DEFICIENCY ANEMIA DURING PREGNANCY: Primary | ICD-10-CM

## 2023-09-21 DIAGNOSIS — O99.119 THROMBOPHILIA COMPLICATING PREGNANCY, ANTEPARTUM (HCC): Primary | ICD-10-CM

## 2023-09-21 DIAGNOSIS — D68.59 THROMBOPHILIA COMPLICATING PREGNANCY, ANTEPARTUM (HCC): Primary | ICD-10-CM

## 2023-09-21 DIAGNOSIS — O99.019 IRON DEFICIENCY ANEMIA DURING PREGNANCY: ICD-10-CM

## 2023-09-21 DIAGNOSIS — D50.9 IRON DEFICIENCY ANEMIA DURING PREGNANCY: ICD-10-CM

## 2023-09-21 LAB
ALBUMIN SERPL-MCNC: 2.4 G/DL (ref 3.4–5)
ALBUMIN/GLOB SERPL: 0.6 {RATIO} (ref 1–2)
ALP LIVER SERPL-CCNC: 68 U/L
ALT SERPL-CCNC: 15 U/L
ANION GAP SERPL CALC-SCNC: 8 MMOL/L (ref 0–18)
AST SERPL-CCNC: 16 U/L (ref 15–37)
BASOPHILS # BLD AUTO: 0.03 X10(3) UL (ref 0–0.2)
BASOPHILS NFR BLD AUTO: 0.3 %
BILIRUB SERPL-MCNC: 0.2 MG/DL (ref 0.1–2)
BUN BLD-MCNC: 5 MG/DL (ref 7–18)
CALCIUM BLD-MCNC: 8.6 MG/DL (ref 8.5–10.1)
CHLORIDE SERPL-SCNC: 111 MMOL/L (ref 98–112)
CO2 SERPL-SCNC: 19 MMOL/L (ref 21–32)
CREAT BLD-MCNC: 0.42 MG/DL
EGFRCR SERPLBLD CKD-EPI 2021: 132 ML/MIN/1.73M2 (ref 60–?)
EOSINOPHIL # BLD AUTO: 0.1 X10(3) UL (ref 0–0.7)
EOSINOPHIL NFR BLD AUTO: 1 %
ERYTHROCYTE [DISTWIDTH] IN BLOOD BY AUTOMATED COUNT: 13.6 %
FASTING STATUS PATIENT QL REPORTED: NO
GLOBULIN PLAS-MCNC: 4.2 G/DL (ref 2.8–4.4)
GLUCOSE BLD-MCNC: 102 MG/DL (ref 70–99)
HAPTOGLOB SERPL-MCNC: 68.5 MG/DL (ref 30–200)
HCT VFR BLD AUTO: 35 %
HGB BLD-MCNC: 12 G/DL
HGB RETIC QN AUTO: 32.2 PG (ref 28.2–36.6)
IMM GRANULOCYTES # BLD AUTO: 0.06 X10(3) UL (ref 0–1)
IMM GRANULOCYTES NFR BLD: 0.6 %
IMM RETICS NFR: 0.07 RATIO (ref 0.1–0.3)
LDH SERPL L TO P-CCNC: 129 U/L
LYMPHOCYTES # BLD AUTO: 2.17 X10(3) UL (ref 1–4)
LYMPHOCYTES NFR BLD AUTO: 21.1 %
MCH RBC QN AUTO: 28.4 PG (ref 26–34)
MCHC RBC AUTO-ENTMCNC: 34.3 G/DL (ref 31–37)
MCV RBC AUTO: 82.9 FL
MONOCYTES # BLD AUTO: 0.65 X10(3) UL (ref 0.1–1)
MONOCYTES NFR BLD AUTO: 6.3 %
NEUTROPHILS # BLD AUTO: 7.26 X10 (3) UL (ref 1.5–7.7)
NEUTROPHILS # BLD AUTO: 7.26 X10(3) UL (ref 1.5–7.7)
NEUTROPHILS NFR BLD AUTO: 70.7 %
OSMOLALITY SERPL CALC.SUM OF ELEC: 283 MOSM/KG (ref 275–295)
PLATELET # BLD AUTO: 254 10(3)UL (ref 150–450)
POTASSIUM SERPL-SCNC: 3.7 MMOL/L (ref 3.5–5.1)
PROT SERPL-MCNC: 6.6 G/DL (ref 6.4–8.2)
RBC # BLD AUTO: 4.22 X10(6)UL
RETICS # AUTO: 54.9 X10(3) UL (ref 22.5–147.5)
RETICS/RBC NFR AUTO: 1.3 %
SODIUM SERPL-SCNC: 138 MMOL/L (ref 136–145)
WBC # BLD AUTO: 10.3 X10(3) UL (ref 4–11)

## 2023-09-21 PROCEDURE — 96365 THER/PROPH/DIAG IV INF INIT: CPT

## 2023-09-21 PROCEDURE — 96376 TX/PRO/DX INJ SAME DRUG ADON: CPT

## 2023-09-21 PROCEDURE — 99215 OFFICE O/P EST HI 40 MIN: CPT | Performed by: INTERNAL MEDICINE

## 2023-09-21 NOTE — PROGRESS NOTES
Education Record    Learner:  Patient    Disease / Diagnosis: pt here for infed infusion    Barriers / Limitations:  None   Comments:    Method:  Brief focused   Comments:    General Topics:  Plan of care reviewed   Comments:    Outcome:  Shows understanding   Comments:

## 2023-09-21 NOTE — PROGRESS NOTES
Education Record     Learner:  Patient     Disease / Diagnosis: TTP     Barriers / Limitations:  None                Comments:     Method:  Discussion                Comments:     General Topics:  Plan of care reviewed                Comments:     Outcome:  Shows understanding                Comments: 1 month MD f/up and iron infusion. Pt states she is 31 weeks pregnant. Reports occasional fatigue.

## 2023-09-24 LAB — ADAMTS13 ACTIVITY: 89.3 %

## 2023-09-29 ENCOUNTER — ROUTINE PRENATAL (OUTPATIENT)
Dept: OBGYN CLINIC | Facility: CLINIC | Age: 33
End: 2023-09-29
Payer: COMMERCIAL

## 2023-09-29 VITALS
WEIGHT: 143 LBS | SYSTOLIC BLOOD PRESSURE: 100 MMHG | DIASTOLIC BLOOD PRESSURE: 58 MMHG | BODY MASS INDEX: 27 KG/M2 | HEART RATE: 109 BPM

## 2023-09-29 DIAGNOSIS — Z34.90 PRENATAL CARE, ANTEPARTUM: Primary | ICD-10-CM

## 2023-09-29 PROCEDURE — 3078F DIAST BP <80 MM HG: CPT | Performed by: OBSTETRICS & GYNECOLOGY

## 2023-09-29 PROCEDURE — 3074F SYST BP LT 130 MM HG: CPT | Performed by: OBSTETRICS & GYNECOLOGY

## 2023-09-29 NOTE — PATIENT INSTRUCTIONS
LABOR & DELIVERY PRE-REGISTRATION    Thank you for choosing [facility] for you labor and delivery needs. We look forward to caring for you and your family in this exciting time. In order to better care for all of our patients, [facility] recommends that you complete your delivery registration as early in your pregnancy as possible. Registering for your delivery in advance saves you the time of doing so on your day of delivery and allows your care team to be better prepared for your delivery date. For more information about Labor and Delivery at [facility] and to pre-register, visit [address]--> Search Our Services-->Pregnancy & Baby. TWO WAYS TO REGISTER ONLINE    Epic MyChart allows access to multiple medical organizations, including [employer] and other medical organizations that use the [employer] system. To add [employer] or any other medical organization, just tap My Organizations at the top left corner of the login page in the [address] [employer], and then click Add Organization. [facility] in [address] [place] [address] for your hospital stay through your [employer] account. After logging into [employer], click on Visits. Under Visits, click on [facility] for Delivery and follow the directions to register. You may print the confirmation page. If you do not already have a [employer] account, ask our office for an Access Code. Go to [email or web address] and follow the prompts to set up your account. [facility] on [email or web address]- Pre-register for your hospital stay through the convenient online form on our website. Go to [email or web address]. Scroll down the page and click on [address] out all of the required information and click submit. You will receive a confirmation of your submission. Questions about registering for your hospital stay? Contact the [facility] & Delivery at [phone].       PRENATAL CLASSES    Both in-person and virtual classes are available. Weeknight and weekend classes are available. Go to [email or web address]   or   [email or web address]  Click on drop down menu on the right side  Scroll down to \"Find a Class or Support Group\"  Scroll down and click on \"[address]\"  Type any of the following in the Search Bar  - [facility]/ [employer] [address]- for the [address] [place] [address] Street (even if you are planning on having an epidural). Topics include labor and birth, breathing techniques, epidurals, postpartum issues. - Virtual Childbirth Express  - Breastfeeding       FETAL MOVEMENT CHART    Although not all women need to perform daily fetal movement counts, if you notice a decrease in fetal activity, you should contact our office immediately. Begin counting fetal movements at 32 weeks of pregnancy. You may find that your baby is more active after eating or drinking. We want you to time how long it takes to feel 10 movements (kicks, flutters, swishes or rolls). You should feel at least 10 movements in 2 hours. You will likely feel 10 movements in less than 2 hours. If you have concerns, you can use the attached table to record movements. Record the time you feel the first movement and the tenth movement. This will help you observe patterns and discover how long it normally takes your baby to move 10 times. Please call us if any of the following occurs: You have not felt the baby move for a couple of hours  It is taking longer each day to get the tenth movement    The main point is we want you to know the characteristics of your baby, so you can tell the doctor or nurse if something different is happening. Again, if you notice a decrease in fetal movement, please give the office a call.     If our office is closed, the answering service will page the doctor on-call.    ------------------------------      Time VIMAL ZHOU S Time M T W Th F S S                                                                                                           Time M T W Th F S S                                                                                                           Time M T W Th F S S

## 2023-09-29 NOTE — PROGRESS NOTES
Patient presents with:  Prenatal Care: LEXIS    Routine prenatal visit without complaints. Patient denies any bleeding, leaking fluid, cramping, or regular uterine contractions. Good fetal movement. Assessment/Plan:  32w2d doing well  GDMA1- most BS's in range. Occasional 's  TTP- Dr. Jack David following. Had plasma exchange recently. Growth BPP MFM 10/3/23  Kick counts reminded  Reviewed  labor signs and symptoms. Reviewed vaccine recommendations: Tdap done. Diagnoses and all orders for this visit:    Prenatal care, antepartum       Return in about 2 weeks (around 10/13/2023) for Routine Prenatal Visit.

## 2023-10-03 ENCOUNTER — OFFICE VISIT (OUTPATIENT)
Dept: PERINATAL CARE | Facility: HOSPITAL | Age: 33
End: 2023-10-03
Attending: OBSTETRICS & GYNECOLOGY
Payer: COMMERCIAL

## 2023-10-03 VITALS
BODY MASS INDEX: 26.62 KG/M2 | WEIGHT: 141 LBS | HEART RATE: 105 BPM | HEIGHT: 61 IN | SYSTOLIC BLOOD PRESSURE: 116 MMHG | DIASTOLIC BLOOD PRESSURE: 66 MMHG

## 2023-10-03 DIAGNOSIS — D69.6 THROMBOCYTOPENIA (HCC): ICD-10-CM

## 2023-10-03 DIAGNOSIS — O99.119 THROMBOPHILIA COMPLICATING PREGNANCY, ANTEPARTUM (HCC): ICD-10-CM

## 2023-10-03 DIAGNOSIS — O24.419 GESTATIONAL DIABETES MELLITUS (GDM) AFFECTING PREGNANCY: ICD-10-CM

## 2023-10-03 DIAGNOSIS — D68.59 THROMBOPHILIA COMPLICATING PREGNANCY, ANTEPARTUM (HCC): ICD-10-CM

## 2023-10-03 DIAGNOSIS — O24.419 GESTATIONAL DIABETES MELLITUS (GDM) AFFECTING PREGNANCY: Primary | ICD-10-CM

## 2023-10-03 PROCEDURE — 76819 FETAL BIOPHYS PROFIL W/O NST: CPT

## 2023-10-03 PROCEDURE — 76816 OB US FOLLOW-UP PER FETUS: CPT | Performed by: OBSTETRICS & GYNECOLOGY

## 2023-10-13 PROBLEM — Z34.80 PRENATAL CARE OF MULTIGRAVIDA, ANTEPARTUM: Status: ACTIVE | Noted: 2023-10-13

## 2023-10-13 PROBLEM — Z34.80 PRENATAL CARE OF MULTIGRAVIDA, ANTEPARTUM (HCC): Status: ACTIVE | Noted: 2023-10-13

## 2023-10-14 ENCOUNTER — ROUTINE PRENATAL (OUTPATIENT)
Dept: OBGYN CLINIC | Facility: CLINIC | Age: 33
End: 2023-10-14
Payer: COMMERCIAL

## 2023-10-14 VITALS
SYSTOLIC BLOOD PRESSURE: 106 MMHG | HEART RATE: 128 BPM | WEIGHT: 142.5 LBS | BODY MASS INDEX: 27 KG/M2 | DIASTOLIC BLOOD PRESSURE: 72 MMHG

## 2023-10-14 DIAGNOSIS — Z34.80 PRENATAL CARE OF MULTIGRAVIDA, ANTEPARTUM: Primary | ICD-10-CM

## 2023-10-14 DIAGNOSIS — Z23 NEED FOR VACCINATION: ICD-10-CM

## 2023-10-14 PROCEDURE — 3078F DIAST BP <80 MM HG: CPT | Performed by: OBSTETRICS & GYNECOLOGY

## 2023-10-14 PROCEDURE — 90686 IIV4 VACC NO PRSV 0.5 ML IM: CPT | Performed by: OBSTETRICS & GYNECOLOGY

## 2023-10-14 PROCEDURE — 3074F SYST BP LT 130 MM HG: CPT | Performed by: OBSTETRICS & GYNECOLOGY

## 2023-10-14 PROCEDURE — 90471 IMMUNIZATION ADMIN: CPT | Performed by: OBSTETRICS & GYNECOLOGY

## 2023-10-14 NOTE — PROGRESS NOTES
34w2d seen for routine OB visit. Denies ctx, lof, vb. Reports good FM. Patient Active Problem List:     Thrombocytopenia ([address]     TTP (thrombotic thrombocytopenic purpura) (HCC)     Transaminitis     Thrombophilia complicating pregnancy, antepartum (HCC)     Normocytic anemia     Gestational diabetes mellitus (GDM) affecting pregnancy     Iron deficiency anemia during pregnancy     Prenatal care of multigravida, antepartum       TW lb (6.35 kg)   Depression Scale Total: 0 (2023  8:42 AM)      Gen: AAOx3, NAD  Resp: breathing comfortably  Abdomen: gravid, soft, nontender  Ext: nontender, no edema    Plan:  - reviewed blood sugars - largely wnl, continue checking  - s/p tdap, flu vaccine today  - TTP - [id number] 89% on , heme/onc appt 10/19  - appropriate fetal growth - f/u MFM appt 11/3  - 3T HIV neg  - return precautions reviewed    RTO in 2 week(s).

## 2023-10-19 ENCOUNTER — OFFICE VISIT (OUTPATIENT)
Dept: HEMATOLOGY/ONCOLOGY | Facility: HOSPITAL | Age: 33
End: 2023-10-19
Attending: INTERNAL MEDICINE
Payer: COMMERCIAL

## 2023-10-19 VITALS
DIASTOLIC BLOOD PRESSURE: 72 MMHG | SYSTOLIC BLOOD PRESSURE: 109 MMHG | HEART RATE: 96 BPM | TEMPERATURE: 98 F | HEIGHT: 60.98 IN | OXYGEN SATURATION: 99 % | RESPIRATION RATE: 16 BRPM | WEIGHT: 143 LBS | BODY MASS INDEX: 27 KG/M2

## 2023-10-19 DIAGNOSIS — O99.019 IRON DEFICIENCY ANEMIA DURING PREGNANCY: ICD-10-CM

## 2023-10-19 DIAGNOSIS — D64.9 NORMOCYTIC ANEMIA: ICD-10-CM

## 2023-10-19 DIAGNOSIS — O99.119 THROMBOPHILIA COMPLICATING PREGNANCY, ANTEPARTUM (HCC): Primary | ICD-10-CM

## 2023-10-19 DIAGNOSIS — M31.19 TTP (THROMBOTIC THROMBOCYTOPENIC PURPURA) (HCC): ICD-10-CM

## 2023-10-19 DIAGNOSIS — D50.9 IRON DEFICIENCY ANEMIA DURING PREGNANCY: ICD-10-CM

## 2023-10-19 DIAGNOSIS — D68.59 THROMBOPHILIA COMPLICATING PREGNANCY, ANTEPARTUM (HCC): Primary | ICD-10-CM

## 2023-10-19 LAB
ALBUMIN SERPL-MCNC: 2.7 G/DL (ref 3.4–5)
ALBUMIN/GLOB SERPL: 0.6 {RATIO} (ref 1–2)
ALP LIVER SERPL-CCNC: 85 U/L
ALT SERPL-CCNC: 20 U/L
ANION GAP SERPL CALC-SCNC: 7 MMOL/L (ref 0–18)
AST SERPL-CCNC: 19 U/L (ref 15–37)
BASOPHILS # BLD AUTO: 0.03 X10(3) UL (ref 0–0.2)
BASOPHILS NFR BLD AUTO: 0.3 %
BILIRUB SERPL-MCNC: 0.2 MG/DL (ref 0.1–2)
BUN BLD-MCNC: 7 MG/DL (ref 7–18)
CALCIUM BLD-MCNC: 8.7 MG/DL (ref 8.5–10.1)
CHLORIDE SERPL-SCNC: 109 MMOL/L (ref 98–112)
CO2 SERPL-SCNC: 20 MMOL/L (ref 21–32)
CREAT BLD-MCNC: 0.6 MG/DL
EGFRCR SERPLBLD CKD-EPI 2021: 121 ML/MIN/1.73M2 (ref 60–?)
EOSINOPHIL # BLD AUTO: 0.08 X10(3) UL (ref 0–0.7)
EOSINOPHIL NFR BLD AUTO: 0.9 %
ERYTHROCYTE [DISTWIDTH] IN BLOOD BY AUTOMATED COUNT: 13.5 %
GLOBULIN PLAS-MCNC: 4.2 G/DL (ref 2.8–4.4)
GLUCOSE BLD-MCNC: 87 MG/DL (ref 70–99)
HAPTOGLOB SERPL-MCNC: 69.2 MG/DL (ref 30–200)
HCT VFR BLD AUTO: 37.8 %
HGB BLD-MCNC: 12.9 G/DL
HGB RETIC QN AUTO: 32.8 PG (ref 28.2–36.6)
IMM GRANULOCYTES # BLD AUTO: 0.04 X10(3) UL (ref 0–1)
IMM GRANULOCYTES NFR BLD: 0.4 %
IMM RETICS NFR: 0.09 RATIO (ref 0.1–0.3)
LDH SERPL L TO P-CCNC: 148 U/L
LYMPHOCYTES # BLD AUTO: 1.86 X10(3) UL (ref 1–4)
LYMPHOCYTES NFR BLD AUTO: 20.4 %
MCH RBC QN AUTO: 28.6 PG (ref 26–34)
MCHC RBC AUTO-ENTMCNC: 34.1 G/DL (ref 31–37)
MCV RBC AUTO: 83.8 FL
MONOCYTES # BLD AUTO: 0.62 X10(3) UL (ref 0.1–1)
MONOCYTES NFR BLD AUTO: 6.8 %
NEUTROPHILS # BLD AUTO: 6.5 X10 (3) UL (ref 1.5–7.7)
NEUTROPHILS # BLD AUTO: 6.5 X10(3) UL (ref 1.5–7.7)
NEUTROPHILS NFR BLD AUTO: 71.2 %
OSMOLALITY SERPL CALC.SUM OF ELEC: 279 MOSM/KG (ref 275–295)
PLATELET # BLD AUTO: 234 10(3)UL (ref 150–450)
POTASSIUM SERPL-SCNC: 3.5 MMOL/L (ref 3.5–5.1)
PROT SERPL-MCNC: 6.9 G/DL (ref 6.4–8.2)
RBC # BLD AUTO: 4.51 X10(6)UL
RETICS # AUTO: 64.9 X10(3) UL (ref 22.5–147.5)
RETICS/RBC NFR AUTO: 1.4 %
SODIUM SERPL-SCNC: 136 MMOL/L (ref 136–145)
WBC # BLD AUTO: 9.1 X10(3) UL (ref 4–11)

## 2023-10-19 PROCEDURE — 99214 OFFICE O/P EST MOD 30 MIN: CPT | Performed by: INTERNAL MEDICINE

## 2023-10-19 NOTE — PROGRESS NOTES
Education Record     Learner:  Patient     Disease / Diagnosis: TTP     Barriers / Limitations:  None                Comments:     Method:  Discussion                Comments:     General Topics:  Plan of care reviewed                Comments:     Outcome:  Shows understanding                Comments: 1 month MD f/up. Pt states she is 35 weeks pregnant. Reports her tachycardia is improved since iron infusion. Reports fatigue.

## 2023-10-20 ENCOUNTER — TELEPHONE (OUTPATIENT)
Dept: OBGYN CLINIC | Facility: CLINIC | Age: 33
End: 2023-10-20

## 2023-10-20 NOTE — TELEPHONE ENCOUNTER
Disab forms received in forms dept via [employer]. Forms logged for processing. Valid auth with forms.

## 2023-10-22 LAB — ADAMTS13 ACTIVITY: 84.8 %

## 2023-10-28 ENCOUNTER — ROUTINE PRENATAL (OUTPATIENT)
Dept: OBGYN CLINIC | Facility: CLINIC | Age: 33
End: 2023-10-28

## 2023-10-28 VITALS
DIASTOLIC BLOOD PRESSURE: 64 MMHG | WEIGHT: 143.19 LBS | BODY MASS INDEX: 27 KG/M2 | HEART RATE: 113 BPM | SYSTOLIC BLOOD PRESSURE: 106 MMHG

## 2023-10-28 DIAGNOSIS — O24.419 GESTATIONAL DIABETES MELLITUS (GDM) AFFECTING PREGNANCY: ICD-10-CM

## 2023-10-28 DIAGNOSIS — Z34.80 SUPERVISION OF OTHER NORMAL PREGNANCY, ANTEPARTUM: Primary | ICD-10-CM

## 2023-10-28 DIAGNOSIS — D68.59 THROMBOPHILIA COMPLICATING PREGNANCY, ANTEPARTUM (HCC): ICD-10-CM

## 2023-10-28 DIAGNOSIS — O99.119 THROMBOPHILIA COMPLICATING PREGNANCY, ANTEPARTUM (HCC): ICD-10-CM

## 2023-10-28 PROCEDURE — 87081 CULTURE SCREEN ONLY: CPT | Performed by: OBSTETRICS & GYNECOLOGY

## 2023-10-28 PROCEDURE — 3078F DIAST BP <80 MM HG: CPT | Performed by: OBSTETRICS & GYNECOLOGY

## 2023-10-28 PROCEDURE — 3074F SYST BP LT 130 MM HG: CPT | Performed by: OBSTETRICS & GYNECOLOGY

## 2023-10-28 PROCEDURE — 87653 STREP B DNA AMP PROBE: CPT | Performed by: OBSTETRICS & GYNECOLOGY

## 2023-10-28 NOTE — PROGRESS NOTES
LEXIS    GA: 36w3d   10/28/23  1040   BP: 106/64   Pulse: 113   Weight: 143 lb 3.2 oz (65 kg)       Doing well, +FM  Denies LOF/VB/uctx  Mode of delivery:  anticipated  SVE 0/50/-2   GBS collected   Fetal movement count given  Labor precautions provided     Problem List Items Addressed This Visit          Endocrine and Metabolic    Gestational diabetes mellitus (GDM) affecting pregnancy       Gravid and     Thrombophilia complicating pregnancy, antepartum ([facility]    Supervision of other normal pregnancy, antepartum - Primary    Relevant Orders    Strep B Culture         RTC 1 week              Note to patient and family   The [facility] makes medical notes available to patients in the interest of transparency. However, please be advised that this is a medical document. It is intended as [id number] communication. It is written and medical language may contain abbreviations or verbiage that are technical and unfamiliar. It may appear blunt or direct. Medical documents are intended to carry relevant information, facts as evident, and the clinical opinion of the practitioner.

## 2023-10-30 LAB — GROUP B STREP BY PCR FOR PCR OVT: NEGATIVE

## 2023-11-03 ENCOUNTER — OFFICE VISIT (OUTPATIENT)
Dept: PERINATAL CARE | Facility: HOSPITAL | Age: 33
End: 2023-11-03
Attending: OBSTETRICS & GYNECOLOGY
Payer: COMMERCIAL

## 2023-11-03 VITALS
WEIGHT: 143 LBS | HEART RATE: 120 BPM | SYSTOLIC BLOOD PRESSURE: 100 MMHG | BODY MASS INDEX: 27 KG/M2 | DIASTOLIC BLOOD PRESSURE: 62 MMHG | HEIGHT: 61 IN

## 2023-11-03 DIAGNOSIS — D69.6 THROMBOCYTOPENIA (HCC): ICD-10-CM

## 2023-11-03 DIAGNOSIS — O24.419 GESTATIONAL DIABETES MELLITUS (GDM) AFFECTING PREGNANCY: ICD-10-CM

## 2023-11-03 DIAGNOSIS — O24.419 GESTATIONAL DIABETES MELLITUS (GDM) AFFECTING PREGNANCY: Primary | ICD-10-CM

## 2023-11-03 PROCEDURE — 76819 FETAL BIOPHYS PROFIL W/O NST: CPT

## 2023-11-03 PROCEDURE — 76820 UMBILICAL ARTERY ECHO: CPT

## 2023-11-03 PROCEDURE — 76816 OB US FOLLOW-UP PER FETUS: CPT | Performed by: OBSTETRICS & GYNECOLOGY

## 2023-11-03 NOTE — PROGRESS NOTES
Pt here for Growth Ultrasound  +fm noted per patient  Pt denies complaints.   Pt GDM--diet controlled--OB following

## 2023-11-04 ENCOUNTER — ROUTINE PRENATAL (OUTPATIENT)
Dept: OBGYN CLINIC | Facility: CLINIC | Age: 33
End: 2023-11-04
Payer: COMMERCIAL

## 2023-11-04 VITALS
HEART RATE: 104 BPM | HEIGHT: 61 IN | WEIGHT: 144 LBS | DIASTOLIC BLOOD PRESSURE: 60 MMHG | BODY MASS INDEX: 27.19 KG/M2 | SYSTOLIC BLOOD PRESSURE: 110 MMHG

## 2023-11-04 DIAGNOSIS — Z34.80 SUPERVISION OF OTHER NORMAL PREGNANCY, ANTEPARTUM: Primary | ICD-10-CM

## 2023-11-04 DIAGNOSIS — O24.419 GESTATIONAL DIABETES MELLITUS (GDM) AFFECTING PREGNANCY: ICD-10-CM

## 2023-11-04 DIAGNOSIS — D68.59 THROMBOPHILIA COMPLICATING PREGNANCY, ANTEPARTUM (HCC): ICD-10-CM

## 2023-11-04 DIAGNOSIS — O99.119 THROMBOPHILIA COMPLICATING PREGNANCY, ANTEPARTUM (HCC): ICD-10-CM

## 2023-11-04 PROCEDURE — 3078F DIAST BP <80 MM HG: CPT | Performed by: OBSTETRICS & GYNECOLOGY

## 2023-11-04 PROCEDURE — 3074F SYST BP LT 130 MM HG: CPT | Performed by: OBSTETRICS & GYNECOLOGY

## 2023-11-04 PROCEDURE — 3008F BODY MASS INDEX DOCD: CPT | Performed by: OBSTETRICS & GYNECOLOGY

## 2023-11-04 NOTE — PROGRESS NOTES
LEXIS    GA: 37w3d   23  0952   BP: 110/60   Pulse: 104   Weight: 144 lb (65.3 kg)   Height: 61\"       Doing well, +FM  Denies LOF/VB/uctx  Mode of delivery:  anticipated  SVE deferred    GBS neg  Fetal movement count given  Labor precautions provided   GDM glucose reviewed  Patient would like to consider IOL at 39-40wks+. SVE and request for IOL at next visit. Problem List Items Addressed This Visit          Endocrine and Metabolic    Gestational diabetes mellitus (GDM) affecting pregnancy       Gravid and     Thrombophilia complicating pregnancy, antepartum ([facility]    Supervision of other normal pregnancy, antepartum - Primary         RTC 1 week            Note to patient and family   The [facility] makes medical notes available to patients in the interest of transparency. However, please be advised that this is a medical document. It is intended as lrdt-wn-fuml communication. It is written and medical language may contain abbreviations or verbiage that are technical and unfamiliar. It may appear blunt or direct. Medical documents are intended to carry relevant information, facts as evident, and the clinical opinion of the practitioner.

## 2023-11-07 NOTE — TELEPHONE ENCOUNTER
Dr. Block     *The ACKNOWLEDGE button has been moved to the top right ribbon*    Please sign off on form if you agree to: Disab due to maternity leave start on or near álvaro 11/22/23 - 6 weeks vaginal delivery 8 weeks c section  (place your signature on the first page only)    -From your Inbasket, Highlight the patient and click Chart   -Double click the 10/20/23 Forms Completion telephone encounter  -Scroll down to the Media section   -Click the blue Hyperlink: Disab Dr montes 11/6/23    -Click Acknowledge located in the top right ribbon/menu   -Drag the mouse into the blank space of the document and a + sign will appear. Left click to   electronically sign the document.     Thank you,  Emily

## 2023-11-11 ENCOUNTER — ROUTINE PRENATAL (OUTPATIENT)
Dept: OBGYN CLINIC | Facility: CLINIC | Age: 33
End: 2023-11-11
Payer: COMMERCIAL

## 2023-11-11 VITALS
DIASTOLIC BLOOD PRESSURE: 62 MMHG | HEART RATE: 114 BPM | SYSTOLIC BLOOD PRESSURE: 100 MMHG | WEIGHT: 145 LBS | BODY MASS INDEX: 27 KG/M2

## 2023-11-11 DIAGNOSIS — O99.119 THROMBOPHILIA COMPLICATING PREGNANCY, ANTEPARTUM (HCC): Primary | ICD-10-CM

## 2023-11-11 DIAGNOSIS — O24.419 GESTATIONAL DIABETES MELLITUS (GDM) AFFECTING PREGNANCY: Primary | ICD-10-CM

## 2023-11-11 DIAGNOSIS — D68.59 THROMBOPHILIA COMPLICATING PREGNANCY, ANTEPARTUM (HCC): ICD-10-CM

## 2023-11-11 DIAGNOSIS — D68.59 THROMBOPHILIA COMPLICATING PREGNANCY, ANTEPARTUM (HCC): Primary | ICD-10-CM

## 2023-11-11 DIAGNOSIS — O99.119 THROMBOPHILIA COMPLICATING PREGNANCY, ANTEPARTUM (HCC): ICD-10-CM

## 2023-11-11 PROCEDURE — 59025 FETAL NON-STRESS TEST: CPT | Performed by: NURSE PRACTITIONER

## 2023-11-11 PROCEDURE — 3078F DIAST BP <80 MM HG: CPT | Performed by: NURSE PRACTITIONER

## 2023-11-11 PROCEDURE — 3074F SYST BP LT 130 MM HG: CPT | Performed by: NURSE PRACTITIONER

## 2023-11-11 NOTE — PROGRESS NOTES
LEXIS  Doing well, +FM  Denies VB/LOF/uctx  Mode of delivery:  anticipated  Patient defers IOL scheduling today  Labor precautions discussed  RTC 1 week with NST  NST reactive

## 2023-11-13 ENCOUNTER — OFFICE VISIT (OUTPATIENT)
Dept: HEMATOLOGY/ONCOLOGY | Facility: HOSPITAL | Age: 33
End: 2023-11-13
Attending: INTERNAL MEDICINE
Payer: COMMERCIAL

## 2023-11-13 VITALS
SYSTOLIC BLOOD PRESSURE: 113 MMHG | HEART RATE: 104 BPM | BODY MASS INDEX: 27.38 KG/M2 | DIASTOLIC BLOOD PRESSURE: 73 MMHG | OXYGEN SATURATION: 97 % | RESPIRATION RATE: 16 BRPM | HEIGHT: 60.98 IN | WEIGHT: 145 LBS | TEMPERATURE: 99 F

## 2023-11-13 DIAGNOSIS — D50.9 IRON DEFICIENCY ANEMIA DURING PREGNANCY: ICD-10-CM

## 2023-11-13 DIAGNOSIS — M31.19 TTP (THROMBOTIC THROMBOCYTOPENIC PURPURA) (HCC): Primary | ICD-10-CM

## 2023-11-13 DIAGNOSIS — O99.019 IRON DEFICIENCY ANEMIA DURING PREGNANCY: ICD-10-CM

## 2023-11-13 LAB
ALBUMIN SERPL-MCNC: 2.5 G/DL (ref 3.4–5)
ALBUMIN/GLOB SERPL: 0.6 {RATIO} (ref 1–2)
ALP LIVER SERPL-CCNC: 109 U/L
ALT SERPL-CCNC: 16 U/L
ANION GAP SERPL CALC-SCNC: 8 MMOL/L (ref 0–18)
AST SERPL-CCNC: 17 U/L (ref 15–37)
BASOPHILS # BLD AUTO: 0.03 X10(3) UL (ref 0–0.2)
BASOPHILS NFR BLD AUTO: 0.3 %
BILIRUB SERPL-MCNC: 0.3 MG/DL (ref 0.1–2)
BUN BLD-MCNC: 7 MG/DL (ref 9–23)
CALCIUM BLD-MCNC: 8.7 MG/DL (ref 8.5–10.1)
CHLORIDE SERPL-SCNC: 108 MMOL/L (ref 98–112)
CO2 SERPL-SCNC: 19 MMOL/L (ref 21–32)
CREAT BLD-MCNC: 0.53 MG/DL
EGFRCR SERPLBLD CKD-EPI 2021: 124 ML/MIN/1.73M2 (ref 60–?)
EOSINOPHIL # BLD AUTO: 0.11 X10(3) UL (ref 0–0.7)
EOSINOPHIL NFR BLD AUTO: 1.1 %
ERYTHROCYTE [DISTWIDTH] IN BLOOD BY AUTOMATED COUNT: 13.7 %
GLOBULIN PLAS-MCNC: 4.1 G/DL (ref 2.8–4.4)
GLUCOSE BLD-MCNC: 136 MG/DL (ref 70–99)
HAPTOGLOB SERPL-MCNC: 71.8 MG/DL (ref 30–200)
HCT VFR BLD AUTO: 36.4 %
HGB BLD-MCNC: 12.5 G/DL
HGB RETIC QN AUTO: 34 PG (ref 28.2–36.6)
IMM GRANULOCYTES # BLD AUTO: 0.07 X10(3) UL (ref 0–1)
IMM GRANULOCYTES NFR BLD: 0.7 %
IMM RETICS NFR: 0.1 RATIO (ref 0.1–0.3)
LDH SERPL L TO P-CCNC: 160 U/L
LYMPHOCYTES # BLD AUTO: 1.82 X10(3) UL (ref 1–4)
LYMPHOCYTES NFR BLD AUTO: 17.6 %
MCH RBC QN AUTO: 28.2 PG (ref 26–34)
MCHC RBC AUTO-ENTMCNC: 34.3 G/DL (ref 31–37)
MCV RBC AUTO: 82 FL
MONOCYTES # BLD AUTO: 0.59 X10(3) UL (ref 0.1–1)
MONOCYTES NFR BLD AUTO: 5.7 %
NEUTROPHILS # BLD AUTO: 7.71 X10 (3) UL (ref 1.5–7.7)
NEUTROPHILS # BLD AUTO: 7.71 X10(3) UL (ref 1.5–7.7)
NEUTROPHILS NFR BLD AUTO: 74.6 %
OSMOLALITY SERPL CALC.SUM OF ELEC: 280 MOSM/KG (ref 275–295)
PLATELET # BLD AUTO: 242 10(3)UL (ref 150–450)
POTASSIUM SERPL-SCNC: 3.5 MMOL/L (ref 3.5–5.1)
PROT SERPL-MCNC: 6.6 G/DL (ref 6.4–8.2)
RBC # BLD AUTO: 4.44 X10(6)UL
RETICS # AUTO: 72.8 X10(3) UL (ref 22.5–147.5)
RETICS/RBC NFR AUTO: 1.6 %
SODIUM SERPL-SCNC: 135 MMOL/L (ref 136–145)
WBC # BLD AUTO: 10.3 X10(3) UL (ref 4–11)

## 2023-11-13 PROCEDURE — 99214 OFFICE O/P EST MOD 30 MIN: CPT | Performed by: INTERNAL MEDICINE

## 2023-11-13 NOTE — PROGRESS NOTES
Education Record     Learner:  Patient     Disease / Diagnosis: TTP     Barriers / Limitations:  None                Comments:     Method:  Discussion                Comments:     General Topics:  Plan of care reviewed                Comments:     Outcome:  Shows understanding                Comments: 1 month MD f/up. Pt states she is 38 weeks and 5 days. States she has been feeling well. Denies any issues or concerns.

## 2023-11-16 LAB — ADAMTS13 ACTIVITY: 96.4 %

## 2023-11-17 ENCOUNTER — APPOINTMENT (OUTPATIENT)
Dept: OBGYN CLINIC | Facility: CLINIC | Age: 33
End: 2023-11-17
Payer: COMMERCIAL

## 2023-11-17 ENCOUNTER — TELEPHONE (OUTPATIENT)
Dept: OBGYN CLINIC | Facility: CLINIC | Age: 33
End: 2023-11-17

## 2023-11-17 ENCOUNTER — ROUTINE PRENATAL (OUTPATIENT)
Dept: OBGYN CLINIC | Facility: CLINIC | Age: 33
End: 2023-11-17
Payer: COMMERCIAL

## 2023-11-17 VITALS
WEIGHT: 145.13 LBS | BODY MASS INDEX: 27 KG/M2 | SYSTOLIC BLOOD PRESSURE: 104 MMHG | DIASTOLIC BLOOD PRESSURE: 62 MMHG | HEART RATE: 100 BPM

## 2023-11-17 DIAGNOSIS — O24.419 GESTATIONAL DIABETES MELLITUS (GDM) AFFECTING PREGNANCY: ICD-10-CM

## 2023-11-17 DIAGNOSIS — M31.19 TTP (THROMBOTIC THROMBOCYTOPENIC PURPURA) (HCC): ICD-10-CM

## 2023-11-17 DIAGNOSIS — Z34.90 PRENATAL CARE, ANTEPARTUM: Primary | ICD-10-CM

## 2023-11-17 PROCEDURE — 59025 FETAL NON-STRESS TEST: CPT | Performed by: OBSTETRICS & GYNECOLOGY

## 2023-11-17 PROCEDURE — 3074F SYST BP LT 130 MM HG: CPT | Performed by: OBSTETRICS & GYNECOLOGY

## 2023-11-17 PROCEDURE — 3078F DIAST BP <80 MM HG: CPT | Performed by: OBSTETRICS & GYNECOLOGY

## 2023-11-17 NOTE — TELEPHONE ENCOUNTER
----- Message from Ted Masters MD sent at 11/17/2023 11:30 AM CST -----  Would like pitocin induction around due date if possible, no later that 5 days past.  Could also do Mon/Tues if open.   Patient has NST appt on Tuesday  [facility] 11/22/23

## 2023-11-17 NOTE — PROGRESS NOTES
Chief Complaint   Patient presents with    Prenatal Care     NST/LEXIS--will wait to be checked at next appointment     Routine prenatal visit without complaints. Patient denies any bleeding, leaking fluid, cramping, or regular uterine contractions. Good fetal movement. SVE: 2/50/-2 soft mid vertex. Declined sweep  NST: see report. Reactive  Assessment/Plan:  39w2d doing well  GBS neg  GDMA1- BS's doing well  TTP- weekly NST. IOL pitocin request sent for IOL next week, ideally around due date. Kick counts reviewed. Reviewed labor signs and symptoms. Diagnoses and all orders for this visit:    Prenatal care, antepartum    TTP (thrombotic thrombocytopenic purpura) ([facility]  -     FETAL NON-STRESS TEST EMG ONLY [id number]; Future    Gestational diabetes mellitus (GDM) affecting pregnancy  -     FETAL NON-STRESS TEST EMG ONLY [id number];  Future       Return in about 1 week (around 11/24/2023) for Routine Prenatal Visit, NST.

## 2023-11-21 ENCOUNTER — APPOINTMENT (OUTPATIENT)
Dept: OBGYN CLINIC | Facility: CLINIC | Age: 33
End: 2023-11-21
Payer: COMMERCIAL

## 2023-11-21 ENCOUNTER — HOSPITAL ENCOUNTER (OUTPATIENT)
Facility: HOSPITAL | Age: 33
Discharge: HOME | End: 2023-11-22
Attending: OBSTETRICS & GYNECOLOGY | Admitting: OBSTETRICS & GYNECOLOGY
Payer: COMMERCIAL

## 2023-11-21 ENCOUNTER — ROUTINE PRENATAL (OUTPATIENT)
Dept: OBGYN CLINIC | Facility: CLINIC | Age: 33
End: 2023-11-21
Payer: COMMERCIAL

## 2023-11-21 VITALS
HEART RATE: 104 BPM | BODY MASS INDEX: 28 KG/M2 | DIASTOLIC BLOOD PRESSURE: 74 MMHG | SYSTOLIC BLOOD PRESSURE: 106 MMHG | WEIGHT: 146.63 LBS

## 2023-11-21 DIAGNOSIS — Z3A.39 39 WEEKS GESTATION OF PREGNANCY: Primary | ICD-10-CM

## 2023-11-21 PROCEDURE — 59025 FETAL NON-STRESS TEST: CPT | Performed by: STUDENT IN AN ORGANIZED HEALTH CARE EDUCATION/TRAINING PROGRAM

## 2023-11-21 PROCEDURE — 3078F DIAST BP <80 MM HG: CPT | Performed by: STUDENT IN AN ORGANIZED HEALTH CARE EDUCATION/TRAINING PROGRAM

## 2023-11-21 PROCEDURE — 99214 OFFICE O/P EST MOD 30 MIN: CPT | Performed by: STUDENT IN AN ORGANIZED HEALTH CARE EDUCATION/TRAINING PROGRAM

## 2023-11-21 PROCEDURE — 3074F SYST BP LT 130 MM HG: CPT | Performed by: STUDENT IN AN ORGANIZED HEALTH CARE EDUCATION/TRAINING PROGRAM

## 2023-11-21 NOTE — PROGRESS NOTES
RETURN OB 35-41 WGA      GA: 39w6d  Vitals:    23 1502   BP: 106/74   Pulse: 104   Weight: 146 lb 9.6 oz (66.5 kg)       Doing well, +FM  Denies LOF/VB/uctx  Mode of delivery:   anticipated  SVE 2.5/80/-3, membranes sweeped  GBS neg  Fetal movement count given  Labor precautions provided   Cephalic on exam    Patient Active Problem List    Diagnosis    Supervision of other normal pregnancy, antepartum    Iron deficiency anemia during pregnancy    Gestational diabetes mellitus (GDM) affecting pregnancy     -s/p dietician consult  Continue the GDM diet and blood sugar monitoring 4 times daily. She is sending her records to the dietitian and her obstetrician for review. Please let MFM know if you prefer us to follow her GDM. Normocytic anemia    Thrombophilia complicating pregnancy, antepartum (HCC)     [X] MF  Follow-up fetal growth ultrasound at 28 and 34 weeks  23 EFW 1,238 g 28w 4d 46% AC 23%  37 wks EFW 2794 g ( 6 lb 3 oz); 25%. She is to continue the monthly lab evaluations and follow-up as directed by Dr. Danyell Avery      TTP (thrombotic thrombocytopenic purpura) [facility])     Per Dr. Danyell Avery:  [facility] practice recommendations for managing patients with prior history of TTP during pregnancy involves monthly monitoring of labs including CBC and AFCYZF84 levels. If MWORDX37 levels fall to <10% even in the absence of clinical TTP, prophylactic plasma exchange is recommended. If TIWNLJ32 level falls to 10-20%, low-dose steroids should be started. So long as her GLAUSM48 level remains above 20% we will continue surveillance monitoring only as below:                -CBC w/ diff, CMP, LDH, haptoglobin, reticulocyte count, and WTNDZJ99 activity q1 mth  -If she develops clinical TTP relapse would be treated with plasma exchange and steroids. Other options include azathioprine or calcineurin inhibitors. These are favored over rituximab during pregnancy.     [X] MFM  Follow-up fetal growth & BPP at 29 weeks- 37% at 33 wks. 37 wks EFW 2794 g ( 6 lb 3 oz); 25%. She is to continue the monthly lab evaluations and follow-up as directed by Dr. Larry Fuchs  Postpartum Dr Larry Fuchs wants to labs every other day x 5 times      Transaminitis    Thrombocytopenia ([facility]         IOL 11/26/23    NST  reactive and reassuring           Note to patient and family   The [facility] makes medical notes available to patients in the interest of transparency. However, please be advised that this is a medical document. It is intended as unde-zv-ucgx communication. It is written and medical language may contain abbreviations or verbiage that are technical and unfamiliar. It may appear blunt or direct. Medical documents are intended to carry relevant information, facts as evident, and the clinical opinion of the practitioner.     Ebony Krishnan MD

## 2023-11-22 ENCOUNTER — ANESTHESIA (OUTPATIENT)
Dept: OBGYN UNIT | Facility: HOSPITAL | Age: 33
End: 2023-11-22
Payer: COMMERCIAL

## 2023-11-22 ENCOUNTER — ANESTHESIA EVENT (OUTPATIENT)
Dept: OBGYN UNIT | Facility: HOSPITAL | Age: 33
End: 2023-11-22
Payer: COMMERCIAL

## 2023-11-22 ENCOUNTER — HOSPITAL ENCOUNTER (INPATIENT)
Facility: HOSPITAL | Age: 33
LOS: 3 days | Discharge: HOME | End: 2023-11-25
Attending: STUDENT IN AN ORGANIZED HEALTH CARE EDUCATION/TRAINING PROGRAM | Admitting: STUDENT IN AN ORGANIZED HEALTH CARE EDUCATION/TRAINING PROGRAM
Payer: COMMERCIAL

## 2023-11-22 VITALS
WEIGHT: 145 LBS | TEMPERATURE: 98 F | HEART RATE: 108 BPM | DIASTOLIC BLOOD PRESSURE: 68 MMHG | SYSTOLIC BLOOD PRESSURE: 108 MMHG | BODY MASS INDEX: 27.03 KG/M2 | HEIGHT: 61.5 IN

## 2023-11-22 PROBLEM — Z34.90 PREGNANCY: Status: ACTIVE | Noted: 2023-11-22

## 2023-11-22 PROBLEM — Z34.90 PREGNANCY (HCC): Status: ACTIVE | Noted: 2023-11-22

## 2023-11-22 LAB
ANTIBODY SCREEN: NEGATIVE
BASOPHILS # BLD AUTO: 0.03 X10(3) UL (ref 0–0.2)
BASOPHILS NFR BLD AUTO: 0.4 %
EOSINOPHIL # BLD AUTO: 0.13 X10(3) UL (ref 0–0.7)
EOSINOPHIL NFR BLD AUTO: 1.6 %
ERYTHROCYTE [DISTWIDTH] IN BLOOD BY AUTOMATED COUNT: 13.3 %
GLUCOSE BLD-MCNC: 117 MG/DL (ref 70–99)
HCT VFR BLD AUTO: 37.5 %
HGB BLD-MCNC: 12.6 G/DL
IMM GRANULOCYTES # BLD AUTO: 0.06 X10(3) UL (ref 0–1)
IMM GRANULOCYTES NFR BLD: 0.7 %
LYMPHOCYTES # BLD AUTO: 1.97 X10(3) UL (ref 1–4)
LYMPHOCYTES NFR BLD AUTO: 24.5 %
MCH RBC QN AUTO: 28.1 PG (ref 26–34)
MCHC RBC AUTO-ENTMCNC: 33.6 G/DL (ref 31–37)
MCV RBC AUTO: 83.7 FL
MONOCYTES # BLD AUTO: 0.67 X10(3) UL (ref 0.1–1)
MONOCYTES NFR BLD AUTO: 8.3 %
NEUTROPHILS # BLD AUTO: 5.18 X10 (3) UL (ref 1.5–7.7)
NEUTROPHILS # BLD AUTO: 5.18 X10(3) UL (ref 1.5–7.7)
NEUTROPHILS NFR BLD AUTO: 64.5 %
PLATELET # BLD AUTO: 237 10(3)UL (ref 150–450)
RBC # BLD AUTO: 4.48 X10(6)UL
RH BLOOD TYPE: POSITIVE
WBC # BLD AUTO: 8 X10(3) UL (ref 4–11)

## 2023-11-22 PROCEDURE — 59025 FETAL NON-STRESS TEST: CPT | Performed by: OBSTETRICS & GYNECOLOGY

## 2023-11-22 RX ORDER — ACETAMINOPHEN 500 MG
1000 TABLET ORAL EVERY 6 HOURS PRN
Status: DISCONTINUED | OUTPATIENT
Start: 2023-11-22 | End: 2023-11-23

## 2023-11-22 RX ORDER — ACETAMINOPHEN 500 MG
500 TABLET ORAL EVERY 6 HOURS PRN
Status: DISCONTINUED | OUTPATIENT
Start: 2023-11-22 | End: 2023-11-23

## 2023-11-22 RX ORDER — TERBUTALINE SULFATE 1 MG/ML
0.25 INJECTION, SOLUTION SUBCUTANEOUS AS NEEDED
Status: DISCONTINUED | OUTPATIENT
Start: 2023-11-22 | End: 2023-11-23 | Stop reason: HOSPADM

## 2023-11-22 RX ORDER — CITRIC ACID/SODIUM CITRATE 334-500MG
30 SOLUTION, ORAL ORAL AS NEEDED
Status: DISCONTINUED | OUTPATIENT
Start: 2023-11-22 | End: 2023-11-23 | Stop reason: HOSPADM

## 2023-11-22 RX ORDER — ONDANSETRON 2 MG/ML
4 INJECTION INTRAMUSCULAR; INTRAVENOUS EVERY 6 HOURS PRN
Status: DISCONTINUED | OUTPATIENT
Start: 2023-11-22 | End: 2023-11-23 | Stop reason: HOSPADM

## 2023-11-22 RX ORDER — DEXTROSE, SODIUM CHLORIDE, SODIUM LACTATE, POTASSIUM CHLORIDE, AND CALCIUM CHLORIDE 5; .6; .31; .03; .02 G/100ML; G/100ML; G/100ML; G/100ML; G/100ML
INJECTION, SOLUTION INTRAVENOUS AS NEEDED
Status: DISCONTINUED | OUTPATIENT
Start: 2023-11-22 | End: 2023-11-23 | Stop reason: HOSPADM

## 2023-11-22 RX ORDER — SODIUM CHLORIDE, SODIUM LACTATE, POTASSIUM CHLORIDE, CALCIUM CHLORIDE 600; 310; 30; 20 MG/100ML; MG/100ML; MG/100ML; MG/100ML
INJECTION, SOLUTION INTRAVENOUS CONTINUOUS
Status: DISCONTINUED | OUTPATIENT
Start: 2023-11-22 | End: 2023-11-23 | Stop reason: HOSPADM

## 2023-11-22 RX ORDER — IBUPROFEN 600 MG/1
600 TABLET ORAL ONCE AS NEEDED
Status: COMPLETED | OUTPATIENT
Start: 2023-11-22 | End: 2023-11-23

## 2023-11-22 RX ORDER — LIDOCAINE HYDROCHLORIDE AND EPINEPHRINE 15; 5 MG/ML; UG/ML
INJECTION, SOLUTION EPIDURAL AS NEEDED
Status: DISCONTINUED | OUTPATIENT
Start: 2023-11-22 | End: 2023-11-23 | Stop reason: SURG

## 2023-11-22 RX ORDER — LIDOCAINE HYDROCHLORIDE 10 MG/ML
INJECTION, SOLUTION EPIDURAL; INFILTRATION; INTRACAUDAL; PERINEURAL AS NEEDED
Status: DISCONTINUED | OUTPATIENT
Start: 2023-11-22 | End: 2023-11-23 | Stop reason: SURG

## 2023-11-22 RX ORDER — BUPIVACAINE HCL/0.9 % NACL/PF 0.25 %
5 PLASTIC BAG, INJECTION (ML) EPIDURAL AS NEEDED
Status: DISCONTINUED | OUTPATIENT
Start: 2023-11-22 | End: 2023-11-23

## 2023-11-22 RX ORDER — NALBUPHINE HYDROCHLORIDE 10 MG/ML
2.5 INJECTION, SOLUTION INTRAMUSCULAR; INTRAVENOUS; SUBCUTANEOUS
Status: DISCONTINUED | OUTPATIENT
Start: 2023-11-22 | End: 2023-11-23

## 2023-11-22 RX ADMIN — LIDOCAINE HYDROCHLORIDE 10 MG: 10 INJECTION, SOLUTION EPIDURAL; INFILTRATION; INTRACAUDAL; PERINEURAL at 22:52:00

## 2023-11-22 RX ADMIN — LIDOCAINE HYDROCHLORIDE AND EPINEPHRINE 5 ML: 15; 5 INJECTION, SOLUTION EPIDURAL at 22:59:00

## 2023-11-22 NOTE — PROGRESS NOTES
Pt back from walking . Pt states contractions are stronger when she is walking. Pt states she feels like contractions are getting stronger. . efm reapplied

## 2023-11-22 NOTE — NST
Nonstress Test   Patient: Coy Dobson Benipaabdoul    Gestation: 40w0d    NST:       Variability: Moderate           Accelerations: Yes           Decelerations: None            Baseline: 125 BPM           Uterine Irritability: No           Contractions: Irregular                    Contraction Frequency: 3-5 min                   Acoustic Stimulator: No           Nonstress Test Interpretation: Reactive                                 Additional Comments

## 2023-11-22 NOTE — DISCHARGE INSTRUCTIONS
Discharge Instructions    Diet: regular, ADA diet  Activity: Normal activity         General Instructions    Call your OB doctor if: Fluid leaking from your vagina; Decrease in fetal movement;Vaginal or rectal pressure;Uterine contractions 10 minutes or closer for 1 to 2 hours;Vaginal bleeding;Temperature greater than 100F;Uterine contractions increasing in intensity and frequency  Early labor comfort measures: Drink fluids and eat small light meals; Take a walk;Relax, sleep, take a warm bath or shower for 30 minutes or less

## 2023-11-22 NOTE — PROGRESS NOTES
Per dr. Jessica Carlin, Pt given the option of either being discharged home or ambulating x1 hour again. Pt chose to ambulate another hour.

## 2023-11-22 NOTE — PROGRESS NOTES
Pt off monitors and given discharge instructions, including labor s/s. Pt verbalizes understanding and is in agreement. Pt left unit ambulatory accompanied by .

## 2023-11-22 NOTE — PROGRESS NOTES
28year old  at 39+6 weeks gestation presents to triage with c/o contractions since earlier this evening. Pt denies leaking of fluid and reports good fetal movement. Pt had SVE in office this morning and reports some mild spotting. Plan of care for triage discussed with patient.

## 2023-11-22 NOTE — TELEPHONE ENCOUNTER
[facility] Ob[facility] Surgery Scheduling Pool  Please cancel her PM induction.  She only needs the pit    Canceled Cyto and updated calendar

## 2023-11-23 LAB
GLUCOSE BLD-MCNC: 115 MG/DL (ref 70–99)
GLUCOSE BLD-MCNC: 97 MG/DL (ref 70–99)

## 2023-11-23 PROCEDURE — 59400 OBSTETRICAL CARE: CPT | Performed by: OBSTETRICS & GYNECOLOGY

## 2023-11-23 PROCEDURE — 0KQM0ZZ REPAIR PERINEUM MUSCLE, OPEN APPROACH: ICD-10-PCS | Performed by: OBSTETRICS & GYNECOLOGY

## 2023-11-23 PROCEDURE — 0UJD7ZZ INSPECTION OF UTERUS AND CERVIX, VIA NATURAL OR ARTIFICIAL OPENING: ICD-10-PCS | Performed by: OBSTETRICS & GYNECOLOGY

## 2023-11-23 PROCEDURE — 3E0E7GC INTRODUCTION OF OTHER THERAPEUTIC SUBSTANCE INTO PRODUCTS OF CONCEPTION, VIA NATURAL OR ARTIFICIAL OPENING: ICD-10-PCS | Performed by: OBSTETRICS & GYNECOLOGY

## 2023-11-23 PROCEDURE — 10H07YZ INSERTION OF OTHER DEVICE INTO PRODUCTS OF CONCEPTION, VIA NATURAL OR ARTIFICIAL OPENING: ICD-10-PCS | Performed by: OBSTETRICS & GYNECOLOGY

## 2023-11-23 RX ORDER — SIMETHICONE 80 MG
80 TABLET,CHEWABLE ORAL 3 TIMES DAILY PRN
Status: DISCONTINUED | OUTPATIENT
Start: 2023-11-23 | End: 2023-11-25

## 2023-11-23 RX ORDER — BISACODYL 10 MG
10 SUPPOSITORY, RECTAL RECTAL ONCE AS NEEDED
Status: DISCONTINUED | OUTPATIENT
Start: 2023-11-23 | End: 2023-11-25

## 2023-11-23 RX ORDER — ACETAMINOPHEN 500 MG
500 TABLET ORAL EVERY 6 HOURS PRN
Status: DISCONTINUED | OUTPATIENT
Start: 2023-11-23 | End: 2023-11-25

## 2023-11-23 RX ORDER — ACETAMINOPHEN 500 MG
1000 TABLET ORAL EVERY 6 HOURS PRN
Status: DISCONTINUED | OUTPATIENT
Start: 2023-11-23 | End: 2023-11-25

## 2023-11-23 RX ORDER — SODIUM CHLORIDE 9 MG/ML
INJECTION, SOLUTION INTRAVENOUS ONCE
Status: COMPLETED | OUTPATIENT
Start: 2023-11-23 | End: 2023-11-23

## 2023-11-23 RX ORDER — DOCUSATE SODIUM 100 MG/1
100 CAPSULE, LIQUID FILLED ORAL
Status: DISCONTINUED | OUTPATIENT
Start: 2023-11-23 | End: 2023-11-25

## 2023-11-23 RX ORDER — IBUPROFEN 600 MG/1
600 TABLET ORAL EVERY 6 HOURS
Status: DISCONTINUED | OUTPATIENT
Start: 2023-11-23 | End: 2023-11-25

## 2023-11-23 RX ORDER — CALCIUM CARBONATE 500 MG/1
1000 TABLET, CHEWABLE ORAL 2 TIMES DAILY PRN
Status: DISCONTINUED | OUTPATIENT
Start: 2023-11-23 | End: 2023-11-23

## 2023-11-23 NOTE — PLAN OF CARE
Problem: BIRTH - VAGINAL/ SECTION  Goal: Fetal and maternal status remain reassuring during the birth process  Description: INTERVENTIONS:  - Monitor vital signs  - Monitor fetal heart rate  - Monitor uterine activity  - Monitor labor progression (vaginal delivery)  - DVT prophylaxis (C/S delivery)  - Surgical antibiotic prophylaxis (C/S delivery)  Outcome: Progressing     Problem: PAIN - ADULT  Goal: Verbalizes/displays adequate comfort level or patient's stated pain goal  Description: INTERVENTIONS:  - Encourage pt to monitor pain and request assistance  - Assess pain using appropriate pain scale  - Administer analgesics based on type and severity of pain and evaluate response  - Implement non-pharmacological measures as appropriate and evaluate response  - Consider cultural and social influences on pain and pain management  - Manage/alleviate anxiety  - Utilize distraction and/or relaxation techniques  - Monitor for opioid side effects  - Notify MD/LIP if interventions unsuccessful or patient reports new pain  - Anticipate increased pain with activity and pre-medicate as appropriate  Outcome: Progressing     Problem: ANXIETY  Goal: Will report anxiety at manageable levels  Description: INTERVENTIONS:  - Administer medication as ordered  - Teach and rehearse alternative coping skills  - Provide emotional support with 1:1 interaction with staff  Outcome: Progressing     Problem: Patient/Family Goals  Goal: Patient/Family Long Term Goal  Description: Patient's Long Term Goal: patient will have uncomplicated delivery of     Interventions:  - See additional Care Plan goals for specific interventions  Outcome: Progressing  Goal: Patient/Family Short Term Goal  Description: Patient's Short Term Goal: patient will have effective pain management throughout labor    Interventions:   - See additional Care Plan goals for specific interventions  Outcome: Progressing     Problem: SKIN/TISSUE INTEGRITY - ADULT  Goal: Skin integrity remains intact  Description: INTERVENTIONS  - Assess and document risk factors for pressure ulcer development  - Assess and document skin integrity  - Monitor for areas of redness and/or skin breakdown  - Initiate interventions, skin care algorithm/standards of care as needed  Outcome: Progressing  Goal: Incision(s), wounds(s) or drain site(s) healing without S/S of infection  Description: INTERVENTIONS:  - Assess and document risk factors for pressure ulcer development  - Assess and document skin integrity  - Assess and document dressing/incision, wound bed, drain sites and surrounding tissue  - Implement wound care per orders  - Initiate isolation precautions as appropriate  - Initiate Pressure Ulcer prevention bundle as indicated  Outcome: Progressing  Goal: Oral mucous membranes remain intact  Description: INTERVENTIONS  - Assess oral mucosa and hygiene practices  - Implement preventative oral hygiene regimen  - Implement oral medicated treatments as ordered  Outcome: Progressing

## 2023-11-23 NOTE — PLAN OF CARE
Problem: SKIN/TISSUE INTEGRITY - ADULT  Goal: Skin integrity remains intact  Description: INTERVENTIONS  - Assess and document risk factors for pressure ulcer development  - Assess and document skin integrity  - Monitor for areas of redness and/or skin breakdown  - Initiate interventions, skin care algorithm/standards of care as needed  Outcome: Progressing  Goal: Incision(s), wounds(s) or drain site(s) healing without S/S of infection  Description: INTERVENTIONS:  - Assess and document risk factors for pressure ulcer development  - Assess and document skin integrity  - Assess and document dressing/incision, wound bed, drain sites and surrounding tissue  - Implement wound care per orders  - Initiate isolation precautions as appropriate  - Initiate Pressure Ulcer prevention bundle as indicated  Outcome: Progressing  Goal: Oral mucous membranes remain intact  Description: INTERVENTIONS  - Assess oral mucosa and hygiene practices  - Implement preventative oral hygiene regimen  - Implement oral medicated treatments as ordered  Outcome: Progressing     Problem: POSTPARTUM  Goal: Long Term Goal:Experiences normal postpartum course  Description: INTERVENTIONS:  - Assess and monitor vital signs and lab values. - Assess fundus and lochia. - Provide ice/sitz baths for perineum discomfort. - Monitor healing of incision/episiotomy/laceration, and assess for signs and symptoms of infection and hematoma. - Assess bladder function and monitor for bladder distention.  - Provide/instruct/assist with pericare as needed. - Provide VTE prophylaxis as needed. - Monitor bowel function.  - Encourage ambulation and provide assistance as needed. - Assess and monitor emotional status and provide social service/psych resources as needed. - Utilize standard precautions and use personal protective equipment as indicated.  Ensure aseptic care of all intravenous lines and invasive tubes/drains.  - Obtain immunization and exposure to communicable diseases history. Outcome: Progressing  Goal: Optimize infant feeding at the breast  Description: INTERVENTIONS:  - Initiate breast feeding within first hour after birth. - Monitor effectiveness of current breast feeding efforts. - Assess support systems available to mother/family.  - Identify cultural beliefs/practices regarding lactation, letdown techniques, maternal food preferences. - Assess mother's knowledge and previous experience with breast feeding.  - Provide information as needed about early infant feeding cues (e.g., rooting, lip smacking, sucking fingers/hand) versus late cue of crying.  - Discuss/demonstrate breast feeding aids (e.g., infant sling, nursing footstool/pillows, and breast pumps). - Encourage mother/other family members to express feelings/concerns, and actively listen. - Educate father/SO about benefits of breast feeding and how to manage common lactation challenges. - Recommend avoidance of specific medications or substances incompatible with breast feeding.  - Assess and monitor for signs of nipple pain/trauma. - Instruct and provide assistance with proper latch. - Review techniques for milk expression (breast pumping) and storage of breast milk. Provide pumping equipment/supplies, instructions and assistance, as needed. - Encourage rooming-in and breast feeding on demand.  - Encourage skin-to-skin contact. - Provide LC support as needed. - Assess for and manage engorgement. - Provide breast feeding education handouts and information on community breast feeding support. Outcome: Progressing  Goal: Establishment of adequate milk supply with medication/procedure interruptions  Description: INTERVENTIONS:  - Review techniques for milk expression (breast pumping). - Provide pumping equipment/supplies, instructions, and assistance until it is safe to breastfeed infant.   Outcome: Progressing  Goal: Appropriate maternal -  bonding  Description: INTERVENTIONS:  - Assess caregiver- interactions. - Assess caregiver's emotional status and coping mechanisms. - Encourage caregiver to participate in  daily care. - Assess support systems available to mother/family.  - Provide /case management support as needed.   Outcome: Progressing     Problem: Diabetes/Glucose Control  Goal: Glucose maintained within prescribed range  Description: INTERVENTIONS:  - Monitor Blood Glucose as ordered  - Assess for signs and symptoms of hyperglycemia and hypoglycemia  - Administer ordered medications to maintain glucose within target range  - Assess barriers to adequate nutritional intake and initiate nutrition consult as needed  - Instruct patient on self management of diabetes  Outcome: Progressing

## 2023-11-23 NOTE — PROGRESS NOTES
Pt transferred  to Mother Baby room 2218 in stable condition. Report given to [facility], RN. Infant transferred with mother in stable condition.

## 2023-11-23 NOTE — PLAN OF CARE
Problem: BIRTH - VAGINAL/ SECTION  Goal: Fetal and maternal status remain reassuring during the birth process  Description: INTERVENTIONS:  - Monitor vital signs  - Monitor fetal heart rate  - Monitor uterine activity  - Monitor labor progression (vaginal delivery)  - DVT prophylaxis (C/S delivery)  - Surgical antibiotic prophylaxis (C/S delivery)  Outcome: Progressing     Problem: PAIN - ADULT  Goal: Verbalizes/displays adequate comfort level or patient's stated pain goal  Description: INTERVENTIONS:  - Encourage pt to monitor pain and request assistance  - Assess pain using appropriate pain scale  - Administer analgesics based on type and severity of pain and evaluate response  - Implement non-pharmacological measures as appropriate and evaluate response  - Consider cultural and social influences on pain and pain management  - Manage/alleviate anxiety  - Utilize distraction and/or relaxation techniques  - Monitor for opioid side effects  - Notify MD/LIP if interventions unsuccessful or patient reports new pain  - Anticipate increased pain with activity and pre-medicate as appropriate  Outcome: Progressing     Problem: ANXIETY  Goal: Will report anxiety at manageable levels  Description: INTERVENTIONS:  - Administer medication as ordered  - Teach and rehearse alternative coping skills  - Provide emotional support with 1:1 interaction with staff  Outcome: Progressing     Problem: Patient/Family Goals  Goal: Patient/Family Long Term Goal  Description: Patient's Long Term Goal: patient will have uncomplicated delivery of     Interventions:  - See additional Care Plan goals for specific interventions  Outcome: Progressing  Goal: Patient/Family Short Term Goal  Description: Patient's Short Term Goal: patient will have effective pain management throughout labor    Interventions:   - See additional Care Plan goals for specific interventions  Outcome: Progressing

## 2023-11-23 NOTE — L&D DELIVERY NOTE
Monique, Girl [[id number]]      Labor Events     labor?: No   steroids?: None  Antibiotics received during labor?: No  Rupture date/time: 2023     Rupture type: SROM  Fluid color: Clear  Labor type: Spontaneous Onset of Labor  Augmentation: Oxytocin  Indications for augmentation: Ineffective Contraction Pattern  Intrapartum & labor complications: Variable decelerations       Labor Event Times    Labor onset date/time: 2023  Dilation complete date/time: 2023 0955  Start pushing date/time: 2023 1049       [place] Presentation    Presentation: Vertex  Position: Occiput Anterior       Operative Delivery    Operative Vaginal Delivery: Yes  Forceps attempted?: No  Vacuum extractor attempted?: Yes        Indications: Fetal Heart Rate or Rhythm Abnormality   Vacuum type: Kiwi Omni Cup (mushroom)   Vacuum application location: Outlet   Number of pop offs: 0      Failed?: No              Shoulder Dystocia    No data filed       Anesthesia    Method: Epidural              [place] Delivery      Head delivery date/time: 2023 11:08:40   Delivery date/time:  23 11:08:59   Delivery type: Vaginal, Vacuum (Extractor)    Details:     Delivery location: delivery room       Delivery Providers    Delivering Clinician: Alicia Hunt MD   Delivery personnel:  Provider Role   Rd Mason RN Baby Nurse   Crystal Perry, RN Delivery Nurse             Cord    Complications: Nuchal  # of loops: 2  Timed cord clamping: Yes  Time in sec: 30  Cord blood disposition: to lab  Gases sent?: Yes        Measurements      Weight: 2890 g 6 lb 5.9 oz Length: 48.3 cm                       Placenta    Date/time: 2023 1112  Removal: Spontaneous  Appearance: Intact  Disposition: held for future pathology       Apgars    No data filed       Skin to Skin    No data filed       Vaginal Count    Initial count RN: Crystal Perry RN  Initial count Tech: Grey Ding Siobhan   Sponges   Sharps    Initial counts 11   0    Final counts               Delivery (Maternal)    Episiotomy: None  Perineal lacerations: 2nd Repaired?: Yes               Vaginal Delivery Note          Kostacarmela Rey Patient Status:  Inpatient    1990 MRN [id number]   Location [facility] Attending Jeferson Ramos MD   Hosp Day # 1 PCP Aman Obrien MD     Date of Delivery: 23    Pre Op Dx:  IUP at Term    Post Op Dx: Same - delivered    Op: Normal Spontaneous Vaginal Delivery    Surgeon: Lois Gomez MD        Anesthesia: Epidural    EBL:  200 cc    Indications:  Patient is a 28year old  at 40w1d who presented in active labor and SROM. She progressed to complete cervical dilation  with pitocin augmentation       Findings:    Sex: female \"TBD\"     TBKALD:[id number]      Apgars: 8/9      Lacerations: 2nd perineal laceration, no periurethral, no cervical   Nuchal x 2     Procedure: The patients was placed in the dorsolithotomy position and prepped. She was encouraged to push. Maternal pushing was initiated with poor maternal effort. The patient progressed to +2. Maternal pushing continued and FHT noted for recurrent variable decelerations. Recommend VAVD due to NRFHT. Discussion held with the patient about vacuum assisted vaginal delivery including risks, benefits and alternatives. D/w patient patient risks including but not limited to maternal bleeding, maternal injury, fetal bleeding, fetal injury and failed vacuum extraction requiring  delivery. Patient agreeable. Fetal position noted to be EDUIN and fetal station +2. Bladder was emptied after removal of louise. The Kiwi vacuum was placed about 3 cm posterior to anterior fontanelle and activated. The patient was instructed to push. Gentle guidance via vacuum was provided and delivery of fetal head was completed with 1 sets of maternal pushing over 1 contractions.  The vacuum was then removed and no pop offs noted. Nuchal cord x 2 palpated and reduced. The patient was instructed to push again and delivery of the anterior shoulder, posterior shoulder and fetal body was then successful without complications. The infants nose and mouth were bulb suctioned. Vigorous liveborn female. A 30 second cord clamp delayed was performed. The cord was then doubly clamped and cut. The  was placed on the mother's abdomen per her request. A segment of the umbilical cord was taken for cord gases. The cord blood was sampled. Gentle uterine massage helped delivery of the placenta. The placenta delivered spontaneosly by uterine massage. Pitocin IV was then initiated. The uterus was explored and noted to be firm. Good hemostasis noted. The perineum, vaginal mucosa and cervix was then examined. The 2nd degree perineal laceration repaired with 2-0  rapide vicryl. Bleeding was minimal. The patient was then moved to the supine position in stable condition. Sponge and instrument counts were correct. Complications:  None     Mother and infant in good condition. Hrai Nickerson MD   [facility] - OBGYN            Note to patient and family   The [facility] makes medical notes available to patients in the interest of transparency. However, please be advised that this is a medical document. It is intended as [id number] communication. It is written and medical language may contain abbreviations or verbiage that are technical and unfamiliar. It may appear blunt or direct. Medical documents are intended to carry relevant information, facts as evident, and the clinical opinion of the practitioner.

## 2023-11-23 NOTE — PROGRESS NOTES
Received in mother/baby in stable condition in room 4080. Id bands verified. Hugs and kisses verified. Oriented to room. Call light in reach. Side rails up x 2. Bed in low position.

## 2023-11-23 NOTE — ANESTHESIA PROCEDURE NOTES
Labor Analgesia    Date/Time: 11/22/2023 10:51 PM    Performed by: Carmen Max MD  Authorized by: Carmen Max MD      General Information and Staff    Start Time:  11/22/2023 10:51 PM  End Time:  11/22/2023 10:59 PM  Anesthesiologist:  Carmen Max MD  Performed by:   Anesthesiologist  Patient Location:  OB  Site Identification: surface landmarks    Reason for Block: labor epidural    Preanesthetic Checklist: patient identified, IV checked, risks and benefits discussed, monitors and equipment checked, pre-op evaluation, timeout performed, IV bolus, anesthesia consent and sterile technique used      Procedure Details    Patient Position:  Sitting  Prep: ChloraPrep    Monitoring:  Heart rate and continuous pulse ox  Approach:  Midline    Epidural Needle    Injection Technique:   Needle Type:  Tuohy  Needle Gauge:  17 G  Needle Length:  3.375 in  Needle Insertion Depth:  5  Location:  L3-4    Spinal Needle      Catheter    Catheter Type:  End hole  Catheter Size:  19 G  Test Dose:  Negative    Assessment    Sensory Level:  T8    Additional Comments

## 2023-11-24 PROBLEM — O47.9 UTERINE CONTRACTIONS (HCC): Status: ACTIVE | Noted: 2023-11-24

## 2023-11-24 PROBLEM — O47.9 UTERINE CONTRACTIONS: Status: ACTIVE | Noted: 2023-11-24

## 2023-11-24 LAB
BASOPHILS # BLD AUTO: 0.04 X10(3) UL (ref 0–0.2)
BASOPHILS NFR BLD AUTO: 0.3 %
EOSINOPHIL # BLD AUTO: 0.2 X10(3) UL (ref 0–0.7)
EOSINOPHIL NFR BLD AUTO: 1.5 %
ERYTHROCYTE [DISTWIDTH] IN BLOOD BY AUTOMATED COUNT: 13.8 %
GLUCOSE BLD-MCNC: 82 MG/DL (ref 70–99)
HCT VFR BLD AUTO: 37.5 %
HGB BLD-MCNC: 12.5 G/DL
IMM GRANULOCYTES # BLD AUTO: 0.09 X10(3) UL (ref 0–1)
IMM GRANULOCYTES NFR BLD: 0.7 %
LYMPHOCYTES # BLD AUTO: 1.95 X10(3) UL (ref 1–4)
LYMPHOCYTES NFR BLD AUTO: 14.3 %
MCH RBC QN AUTO: 28.2 PG (ref 26–34)
MCHC RBC AUTO-ENTMCNC: 33.3 G/DL (ref 31–37)
MCV RBC AUTO: 84.5 FL
MONOCYTES # BLD AUTO: 0.71 X10(3) UL (ref 0.1–1)
MONOCYTES NFR BLD AUTO: 5.2 %
NEUTROPHILS # BLD AUTO: 10.69 X10 (3) UL (ref 1.5–7.7)
NEUTROPHILS # BLD AUTO: 10.69 X10(3) UL (ref 1.5–7.7)
NEUTROPHILS NFR BLD AUTO: 78 %
PLATELET # BLD AUTO: 206 10(3)UL (ref 150–450)
RBC # BLD AUTO: 4.44 X10(6)UL
T PALLIDUM AB SER QL IA: NONREACTIVE
WBC # BLD AUTO: 13.7 X10(3) UL (ref 4–11)

## 2023-11-24 RX ORDER — IBUPROFEN 600 MG/1
600 TABLET ORAL EVERY 6 HOURS PRN
Qty: 20 TABLET | Refills: 0 | Status: SHIPPED | OUTPATIENT
Start: 2023-11-24

## 2023-11-24 NOTE — DISCHARGE SUMMARY
82 Sadia Amaya Patient Status:  inpatient    1990 MRN [id number]   Location 8276/3335-R Attending [facility] [address] Day # 2 PCP Erinn Barragan MD     VAGINAL DELIVERY DISCHARGE SUMMARY     Admit date: 2023    Discharge date: 2023     Attending Physician: Arti Reeves MD     Discharge Diagnoses: Term pregnancy, ruptured membranes, labor    Procedures: Normal spontaneous vaginal delivery with repair of 2nd degree perineal laceration    Complications: None    Hospital Course: Admitted for ruptured membranes and labor. Routine postpartum care    Discharged Condition: Stable    Disposition: Home     Current Discharge Medication List        START taking these medications    Details   ibuprofen 600 MG Oral Tab Take 1 tablet (600 mg total) by mouth every 6 (six) hours as needed for Pain. Qty: 20 tablet, Refills: 0           CONTINUE these medications which have NOT CHANGED    Details   loratadine 10 MG Oral Tab Take 1 tablet (10 mg total) by mouth daily as needed. Blood Glucose Monitoring Suppl (CONTOUR NEXT GEN MONITOR) w/Device Does not apply Kit 1 kit As Directed. Qty: 1 kit, Refills: 0    Associated Diagnoses: Diet controlled gestational diabetes mellitus (GDM) in third trimester      Microlet Lancets Does not apply Misc Check blood sugar 4 times daily  Qty: 200 each, Refills: 3    Associated Diagnoses: Diet controlled gestational diabetes mellitus (GDM) in third trimester      Omega 3-6-9 Fatty Acids (OMEGA DHA OR) Take by mouth. Prenatal Vit-Fe Fumarate-FA (PRENATAL ONE DAILY) 27-0.8 MG Oral Tab Take 1 tablet by mouth daily. Cholecalciferol (VITAMIN D3) 2000 units Oral Chew Tab Chew 2,000 Units by mouth daily.            STOP taking these medications       Glucose Blood (CONTOUR NEXT TEST) In Vitro Strip              Diet: General    Activity: Light activity, pelvic rest,  no driving for 1 week    Follow-up:   Arti Reeves MD  [id number]  [phone]    Follow up in 6 week(s)  Post Partum appointment

## 2023-11-25 VITALS
SYSTOLIC BLOOD PRESSURE: 104 MMHG | BODY MASS INDEX: 27.03 KG/M2 | HEART RATE: 97 BPM | HEIGHT: 61.5 IN | RESPIRATION RATE: 16 BRPM | DIASTOLIC BLOOD PRESSURE: 65 MMHG | OXYGEN SATURATION: 99 % | TEMPERATURE: 98 F | WEIGHT: 145 LBS

## 2023-11-25 NOTE — PLAN OF CARE
Problem: SKIN/TISSUE INTEGRITY - ADULT  Goal: Skin integrity remains intact  Description: INTERVENTIONS  - Assess and document risk factors for pressure ulcer development  - Assess and document skin integrity  - Monitor for areas of redness and/or skin breakdown  - Initiate interventions, skin care algorithm/standards of care as needed  Outcome: Completed  Goal: Incision(s), wounds(s) or drain site(s) healing without S/S of infection  Description: INTERVENTIONS:  - Assess and document risk factors for pressure ulcer development  - Assess and document skin integrity  - Assess and document dressing/incision, wound bed, drain sites and surrounding tissue  - Implement wound care per orders  - Initiate isolation precautions as appropriate  - Initiate Pressure Ulcer prevention bundle as indicated  Outcome: Completed  Goal: Oral mucous membranes remain intact  Description: INTERVENTIONS  - Assess oral mucosa and hygiene practices  - Implement preventative oral hygiene regimen  - Implement oral medicated treatments as ordered  Outcome: Completed     Problem: POSTPARTUM  Goal: Long Term Goal:Experiences normal postpartum course  Description: INTERVENTIONS:  - Assess and monitor vital signs and lab values. - Assess fundus and lochia. - Provide ice/sitz baths for perineum discomfort. - Monitor healing of incision/episiotomy/laceration, and assess for signs and symptoms of infection and hematoma. - Assess bladder function and monitor for bladder distention.  - Provide/instruct/assist with pericare as needed. - Provide VTE prophylaxis as needed. - Monitor bowel function.  - Encourage ambulation and provide assistance as needed. - Assess and monitor emotional status and provide social service/psych resources as needed. - Utilize standard precautions and use personal protective equipment as indicated.  Ensure aseptic care of all intravenous lines and invasive tubes/drains.  - Obtain immunization and exposure to communicable diseases history. Outcome: Completed  Goal: Optimize infant feeding at the breast  Description: INTERVENTIONS:  - Initiate breast feeding within first hour after birth. - Monitor effectiveness of current breast feeding efforts. - Assess support systems available to mother/family.  - Identify cultural beliefs/practices regarding lactation, letdown techniques, maternal food preferences. - Assess mother's knowledge and previous experience with breast feeding.  - Provide information as needed about early infant feeding cues (e.g., rooting, lip smacking, sucking fingers/hand) versus late cue of crying.  - Discuss/demonstrate breast feeding aids (e.g., infant sling, nursing footstool/pillows, and breast pumps). - Encourage mother/other family members to express feelings/concerns, and actively listen. - Educate father/SO about benefits of breast feeding and how to manage common lactation challenges. - Recommend avoidance of specific medications or substances incompatible with breast feeding.  - Assess and monitor for signs of nipple pain/trauma. - Instruct and provide assistance with proper latch. - Review techniques for milk expression (breast pumping) and storage of breast milk. Provide pumping equipment/supplies, instructions and assistance, as needed. - Encourage rooming-in and breast feeding on demand.  - Encourage skin-to-skin contact. - Provide LC support as needed. - Assess for and manage engorgement. - Provide breast feeding education handouts and information on community breast feeding support. Outcome: Completed  Goal: Establishment of adequate milk supply with medication/procedure interruptions  Description: INTERVENTIONS:  - Review techniques for milk expression (breast pumping). - Provide pumping equipment/supplies, instructions, and assistance until it is safe to breastfeed infant.   Outcome: Completed  Goal: Appropriate maternal -  bonding  Description: INTERVENTIONS:  - Assess caregiver- interactions. - Assess caregiver's emotional status and coping mechanisms. - Encourage caregiver to participate in  daily care. - Assess support systems available to mother/family.  - Provide /case management support as needed.   Outcome: Completed

## 2023-11-25 NOTE — PLAN OF CARE
Problem: SKIN/TISSUE INTEGRITY - ADULT  Goal: Skin integrity remains intact  Description: INTERVENTIONS  - Assess and document risk factors for pressure ulcer development  - Assess and document skin integrity  - Monitor for areas of redness and/or skin breakdown  - Initiate interventions, skin care algorithm/standards of care as needed  Outcome: Progressing  Goal: Incision(s), wounds(s) or drain site(s) healing without S/S of infection  Description: INTERVENTIONS:  - Assess and document risk factors for pressure ulcer development  - Assess and document skin integrity  - Assess and document dressing/incision, wound bed, drain sites and surrounding tissue  - Implement wound care per orders  - Initiate isolation precautions as appropriate  - Initiate Pressure Ulcer prevention bundle as indicated  Outcome: Progressing  Goal: Oral mucous membranes remain intact  Description: INTERVENTIONS  - Assess oral mucosa and hygiene practices  - Implement preventative oral hygiene regimen  - Implement oral medicated treatments as ordered  Outcome: Progressing     Problem: POSTPARTUM  Goal: Long Term Goal:Experiences normal postpartum course  Description: INTERVENTIONS:  - Assess and monitor vital signs and lab values. - Assess fundus and lochia. - Provide ice/sitz baths for perineum discomfort. - Monitor healing of incision/episiotomy/laceration, and assess for signs and symptoms of infection and hematoma. - Assess bladder function and monitor for bladder distention.  - Provide/instruct/assist with pericare as needed. - Provide VTE prophylaxis as needed. - Monitor bowel function.  - Encourage ambulation and provide assistance as needed. - Assess and monitor emotional status and provide social service/psych resources as needed. - Utilize standard precautions and use personal protective equipment as indicated.  Ensure aseptic care of all intravenous lines and invasive tubes/drains.  - Obtain immunization and exposure to communicable diseases history. Outcome: Progressing  Goal: Optimize infant feeding at the breast  Description: INTERVENTIONS:  - Initiate breast feeding within first hour after birth. - Monitor effectiveness of current breast feeding efforts. - Assess support systems available to mother/family.  - Identify cultural beliefs/practices regarding lactation, letdown techniques, maternal food preferences. - Assess mother's knowledge and previous experience with breast feeding.  - Provide information as needed about early infant feeding cues (e.g., rooting, lip smacking, sucking fingers/hand) versus late cue of crying.  - Discuss/demonstrate breast feeding aids (e.g., infant sling, nursing footstool/pillows, and breast pumps). - Encourage mother/other family members to express feelings/concerns, and actively listen. - Educate father/SO about benefits of breast feeding and how to manage common lactation challenges. - Recommend avoidance of specific medications or substances incompatible with breast feeding.  - Assess and monitor for signs of nipple pain/trauma. - Instruct and provide assistance with proper latch. - Review techniques for milk expression (breast pumping) and storage of breast milk. Provide pumping equipment/supplies, instructions and assistance, as needed. - Encourage rooming-in and breast feeding on demand.  - Encourage skin-to-skin contact. - Provide LC support as needed. - Assess for and manage engorgement. - Provide breast feeding education handouts and information on community breast feeding support. Outcome: Progressing  Goal: Establishment of adequate milk supply with medication/procedure interruptions  Description: INTERVENTIONS:  - Review techniques for milk expression (breast pumping). - Provide pumping equipment/supplies, instructions, and assistance until it is safe to breastfeed infant.   Outcome: Progressing  Goal: Appropriate maternal -  bonding  Description: INTERVENTIONS:  - Assess caregiver- interactions. - Assess caregiver's emotional status and coping mechanisms. - Encourage caregiver to participate in  daily care. - Assess support systems available to mother/family.  - Provide /case management support as needed.   Outcome: Progressing

## 2023-11-26 ENCOUNTER — LAB ENCOUNTER (OUTPATIENT)
Dept: LAB | Facility: HOSPITAL | Age: 33
End: 2023-11-26
Attending: INTERNAL MEDICINE
Payer: COMMERCIAL

## 2023-11-26 DIAGNOSIS — M31.19 TTP (THROMBOTIC THROMBOCYTOPENIC PURPURA) (HCC): ICD-10-CM

## 2023-11-26 LAB
BASOPHILS # BLD AUTO: 0.06 X10(3) UL (ref 0–0.2)
BASOPHILS NFR BLD AUTO: 0.6 %
EOSINOPHIL # BLD AUTO: 0.71 X10(3) UL (ref 0–0.7)
EOSINOPHIL NFR BLD AUTO: 6.7 %
ERYTHROCYTE [DISTWIDTH] IN BLOOD BY AUTOMATED COUNT: 13.4 %
HCT VFR BLD AUTO: 38.8 %
HGB BLD-MCNC: 13.1 G/DL
IMM GRANULOCYTES # BLD AUTO: 0.17 X10(3) UL (ref 0–1)
IMM GRANULOCYTES NFR BLD: 1.6 %
LYMPHOCYTES # BLD AUTO: 2.08 X10(3) UL (ref 1–4)
LYMPHOCYTES NFR BLD AUTO: 19.5 %
MCH RBC QN AUTO: 28.3 PG (ref 26–34)
MCHC RBC AUTO-ENTMCNC: 33.8 G/DL (ref 31–37)
MCV RBC AUTO: 83.8 FL
MONOCYTES # BLD AUTO: 0.73 X10(3) UL (ref 0.1–1)
MONOCYTES NFR BLD AUTO: 6.9 %
NEUTROPHILS # BLD AUTO: 6.9 X10 (3) UL (ref 1.5–7.7)
NEUTROPHILS # BLD AUTO: 6.9 X10(3) UL (ref 1.5–7.7)
NEUTROPHILS NFR BLD AUTO: 64.7 %
PLATELET # BLD AUTO: 267 10(3)UL (ref 150–450)
RBC # BLD AUTO: 4.63 X10(6)UL
WBC # BLD AUTO: 10.7 X10(3) UL (ref 4–11)

## 2023-11-26 PROCEDURE — 85025 COMPLETE CBC W/AUTO DIFF WBC: CPT

## 2023-11-26 PROCEDURE — 36415 COLL VENOUS BLD VENIPUNCTURE: CPT

## 2023-11-28 ENCOUNTER — TELEPHONE (OUTPATIENT)
Dept: OBGYN UNIT | Facility: HOSPITAL | Age: 33
End: 2023-11-28

## 2023-11-28 NOTE — PROGRESS NOTES
Cradle call completed. Mom and baby care reviewed. All questions answered. Baby was seen by pediatrician yesterday. Cradle call letters sent via [address]

## 2023-12-18 ENCOUNTER — OFFICE VISIT (OUTPATIENT)
Dept: HEMATOLOGY/ONCOLOGY | Facility: HOSPITAL | Age: 33
End: 2023-12-18
Attending: INTERNAL MEDICINE
Payer: COMMERCIAL

## 2023-12-18 VITALS
HEIGHT: 60.98 IN | RESPIRATION RATE: 18 BRPM | DIASTOLIC BLOOD PRESSURE: 78 MMHG | WEIGHT: 134 LBS | HEART RATE: 97 BPM | OXYGEN SATURATION: 97 % | BODY MASS INDEX: 25.3 KG/M2 | TEMPERATURE: 98 F | SYSTOLIC BLOOD PRESSURE: 115 MMHG

## 2023-12-18 DIAGNOSIS — D68.59 THROMBOPHILIA COMPLICATING PREGNANCY, ANTEPARTUM (HCC): ICD-10-CM

## 2023-12-18 DIAGNOSIS — D50.9 IRON DEFICIENCY ANEMIA DURING PREGNANCY: ICD-10-CM

## 2023-12-18 DIAGNOSIS — O99.119 THROMBOPHILIA COMPLICATING PREGNANCY, ANTEPARTUM (HCC): ICD-10-CM

## 2023-12-18 DIAGNOSIS — O99.019 IRON DEFICIENCY ANEMIA DURING PREGNANCY: ICD-10-CM

## 2023-12-18 DIAGNOSIS — M31.19 TTP (THROMBOTIC THROMBOCYTOPENIC PURPURA) (HCC): Primary | ICD-10-CM

## 2023-12-18 LAB
ALBUMIN SERPL-MCNC: 3.3 G/DL (ref 3.4–5)
ALBUMIN/GLOB SERPL: 0.8 {RATIO} (ref 1–2)
ALP LIVER SERPL-CCNC: 104 U/L
ALT SERPL-CCNC: 101 U/L
ANION GAP SERPL CALC-SCNC: 5 MMOL/L (ref 0–18)
AST SERPL-CCNC: 43 U/L (ref 15–37)
BASOPHILS # BLD AUTO: 0.05 X10(3) UL (ref 0–0.2)
BASOPHILS NFR BLD AUTO: 0.6 %
BILIRUB SERPL-MCNC: 0.3 MG/DL (ref 0.1–2)
BUN BLD-MCNC: 10 MG/DL (ref 9–23)
CALCIUM BLD-MCNC: 9 MG/DL (ref 8.5–10.1)
CHLORIDE SERPL-SCNC: 107 MMOL/L (ref 98–112)
CO2 SERPL-SCNC: 25 MMOL/L (ref 21–32)
CREAT BLD-MCNC: 0.9 MG/DL
DEPRECATED HBV CORE AB SER IA-ACNC: 227.3 NG/ML
EGFRCR SERPLBLD CKD-EPI 2021: 86 ML/MIN/1.73M2 (ref 60–?)
EOSINOPHIL # BLD AUTO: 0.25 X10(3) UL (ref 0–0.7)
EOSINOPHIL NFR BLD AUTO: 2.8 %
ERYTHROCYTE [DISTWIDTH] IN BLOOD BY AUTOMATED COUNT: 12.8 %
GLOBULIN PLAS-MCNC: 4.3 G/DL (ref 2.8–4.4)
GLUCOSE BLD-MCNC: 136 MG/DL (ref 70–99)
HAPTOGLOB SERPL-MCNC: 150 MG/DL (ref 30–200)
HCT VFR BLD AUTO: 42.6 %
HGB BLD-MCNC: 14.7 G/DL
HGB RETIC QN AUTO: 33.7 PG (ref 28.2–36.6)
IMM GRANULOCYTES # BLD AUTO: 0.05 X10(3) UL (ref 0–1)
IMM GRANULOCYTES NFR BLD: 0.6 %
IMM RETICS NFR: 0.02 RATIO (ref 0.1–0.3)
IRON SATN MFR SERPL: 19 %
IRON SERPL-MCNC: 69 UG/DL
LDH SERPL L TO P-CCNC: 252 U/L
LYMPHOCYTES # BLD AUTO: 2.99 X10(3) UL (ref 1–4)
LYMPHOCYTES NFR BLD AUTO: 33.8 %
MCH RBC QN AUTO: 28 PG (ref 26–34)
MCHC RBC AUTO-ENTMCNC: 34.5 G/DL (ref 31–37)
MCV RBC AUTO: 81.1 FL
MONOCYTES # BLD AUTO: 0.61 X10(3) UL (ref 0.1–1)
MONOCYTES NFR BLD AUTO: 6.9 %
NEUTROPHILS # BLD AUTO: 4.89 X10 (3) UL (ref 1.5–7.7)
NEUTROPHILS # BLD AUTO: 4.89 X10(3) UL (ref 1.5–7.7)
NEUTROPHILS NFR BLD AUTO: 55.3 %
OSMOLALITY SERPL CALC.SUM OF ELEC: 285 MOSM/KG (ref 275–295)
PLATELET # BLD AUTO: 327 10(3)UL (ref 150–450)
POTASSIUM SERPL-SCNC: 3.7 MMOL/L (ref 3.5–5.1)
PROT SERPL-MCNC: 7.6 G/DL (ref 6.4–8.2)
RBC # BLD AUTO: 5.25 X10(6)UL
RETICS # AUTO: 30.5 X10(3) UL (ref 22.5–147.5)
RETICS/RBC NFR AUTO: 0.6 %
SODIUM SERPL-SCNC: 137 MMOL/L (ref 136–145)
TIBC SERPL-MCNC: 367 UG/DL (ref 240–450)
TRANSFERRIN SERPL-MCNC: 246 MG/DL (ref 200–360)
WBC # BLD AUTO: 8.8 X10(3) UL (ref 4–11)

## 2023-12-18 PROCEDURE — 99214 OFFICE O/P EST MOD 30 MIN: CPT | Performed by: INTERNAL MEDICINE

## 2023-12-18 NOTE — PROGRESS NOTES
Patient here for f/u visit for TTP after having a baby last month. Denies concerns today. Doing well.

## 2023-12-20 LAB — ADAMTS13 ACTIVITY: 95.4 %

## 2024-01-05 ENCOUNTER — POSTPARTUM (OUTPATIENT)
Dept: OBGYN CLINIC | Facility: CLINIC | Age: 34
End: 2024-01-05
Payer: COMMERCIAL

## 2024-01-05 VITALS
WEIGHT: 135 LBS | HEART RATE: 92 BPM | DIASTOLIC BLOOD PRESSURE: 68 MMHG | SYSTOLIC BLOOD PRESSURE: 116 MMHG | BODY MASS INDEX: 26 KG/M2

## 2024-01-05 DIAGNOSIS — Z86.32 HISTORY OF GESTATIONAL DIABETES: ICD-10-CM

## 2024-01-05 PROCEDURE — 3074F SYST BP LT 130 MM HG: CPT | Performed by: NURSE PRACTITIONER

## 2024-01-05 PROCEDURE — 3078F DIAST BP <80 MM HG: CPT | Performed by: NURSE PRACTITIONER

## 2024-01-05 NOTE — PROGRESS NOTES
Subjective:  Chief Complaint   Patient presents with    Postpartum Care     6wk PP/Depression screening-0     Jeramy Amaya presents for her 6 week post partum exam after vaginal delivery.  She denies any gastrointestinal/genitourinary complaints.  She denies any symptoms of depression.  Infant doing well.  Nursing without difficulty.  No fever, vaginal discharge, or abdominal pain.  No further lochia.    [place]  Depression Scale (EPDS) 0  + gestational diabetes    Objective:  /68   Pulse 92   Wt 135 lb (61.2 kg)   LMP 02/15/2023 (Exact Date)   Breastfeeding Yes   BMI 25.52 kg/m²   Physical Exam:    Abdomen: Soft, non-tender, non-distended, no masses.  Pelvic:    External genitalia- Normal, Bartholin's, urethra, skeins glands normal   Vagina- No vaginal lesions, no discharge   Cervix- No lesions, long/closed, no cervical motion tenderness   Uterus- Normal size, non-tender, no masses   Adnexa-  Non-tender, no masses  Perineum:  Normal  Extremities: Non-tender     Pap/HPV 2023 normal     Assessment:  Normal post partum examination.  Doing well. OK to return to normal activities/work.    Plan:  Follow up one year for routine annual gynecologic examination.    Patient does not desire prescription contraception.    Diagnoses and all orders for this visit:    Postpartum care following vaginal delivery    History of gestational diabetes  -     Glucose Tolerance 75 gm (0hr,2hr) Nongestational; Future        Return in about 1 year (around 2025) for annual well woman exam or sooner if needed.

## 2024-02-05 ENCOUNTER — TELEPHONE (OUTPATIENT)
Dept: OBGYN CLINIC | Facility: CLINIC | Age: 34
End: 2024-02-05

## 2024-02-10 ENCOUNTER — LABORATORY ENCOUNTER (OUTPATIENT)
Dept: LAB | Facility: HOSPITAL | Age: 34
End: 2024-02-10
Attending: NURSE PRACTITIONER
Payer: COMMERCIAL

## 2024-02-10 DIAGNOSIS — Z86.32 HISTORY OF GESTATIONAL DIABETES: ICD-10-CM

## 2024-02-10 LAB
GLUCOSE 1H P GLC SERPL-MCNC: 121 MG/DL
GLUCOSE P FAST SERPL-MCNC: 101 MG/DL (ref 70–99)

## 2024-02-10 PROCEDURE — 82950 GLUCOSE TEST: CPT

## 2024-02-10 PROCEDURE — 36415 COLL VENOUS BLD VENIPUNCTURE: CPT

## 2024-02-10 PROCEDURE — 82947 ASSAY GLUCOSE BLOOD QUANT: CPT

## 2024-04-01 ENCOUNTER — OFFICE VISIT (OUTPATIENT)
Dept: HEMATOLOGY/ONCOLOGY | Facility: HOSPITAL | Age: 34
End: 2024-04-01
Attending: INTERNAL MEDICINE
Payer: COMMERCIAL

## 2024-04-01 VITALS
TEMPERATURE: 98 F | RESPIRATION RATE: 18 BRPM | SYSTOLIC BLOOD PRESSURE: 107 MMHG | OXYGEN SATURATION: 100 % | DIASTOLIC BLOOD PRESSURE: 71 MMHG | HEART RATE: 85 BPM | BODY MASS INDEX: 25 KG/M2 | WEIGHT: 132 LBS

## 2024-04-01 DIAGNOSIS — M31.19 TTP (THROMBOTIC THROMBOCYTOPENIC PURPURA) (HCC): ICD-10-CM

## 2024-04-01 LAB
ALBUMIN SERPL-MCNC: 3.5 G/DL (ref 3.4–5)
ALBUMIN/GLOB SERPL: 0.9 {RATIO} (ref 1–2)
ALP LIVER SERPL-CCNC: 81 U/L
ALT SERPL-CCNC: 50 U/L
ANION GAP SERPL CALC-SCNC: 7 MMOL/L (ref 0–18)
AST SERPL-CCNC: 31 U/L (ref 15–37)
BASOPHILS # BLD AUTO: 0.03 X10(3) UL (ref 0–0.2)
BASOPHILS NFR BLD AUTO: 0.3 %
BILIRUB SERPL-MCNC: 0.2 MG/DL (ref 0.1–2)
BUN BLD-MCNC: 8 MG/DL (ref 9–23)
CALCIUM BLD-MCNC: 9.5 MG/DL (ref 8.5–10.1)
CHLORIDE SERPL-SCNC: 108 MMOL/L (ref 98–112)
CO2 SERPL-SCNC: 25 MMOL/L (ref 21–32)
CREAT BLD-MCNC: 0.72 MG/DL
EGFRCR SERPLBLD CKD-EPI 2021: 112 ML/MIN/1.73M2 (ref 60–?)
EOSINOPHIL # BLD AUTO: 0.16 X10(3) UL (ref 0–0.7)
EOSINOPHIL NFR BLD AUTO: 1.6 %
ERYTHROCYTE [DISTWIDTH] IN BLOOD BY AUTOMATED COUNT: 12.5 %
GLOBULIN PLAS-MCNC: 3.8 G/DL (ref 2.8–4.4)
GLUCOSE BLD-MCNC: 82 MG/DL (ref 70–99)
HAPTOGLOB SERPL-MCNC: 138 MG/DL (ref 30–200)
HCT VFR BLD AUTO: 41.1 %
HGB BLD-MCNC: 13.9 G/DL
HGB RETIC QN AUTO: 31.4 PG (ref 28.2–36.6)
IMM GRANULOCYTES # BLD AUTO: 0.04 X10(3) UL (ref 0–1)
IMM GRANULOCYTES NFR BLD: 0.4 %
IMM RETICS NFR: 0.05 RATIO (ref 0.1–0.3)
LDH SERPL L TO P-CCNC: 161 U/L
LYMPHOCYTES # BLD AUTO: 2.83 X10(3) UL (ref 1–4)
LYMPHOCYTES NFR BLD AUTO: 28.8 %
MCH RBC QN AUTO: 27.9 PG (ref 26–34)
MCHC RBC AUTO-ENTMCNC: 33.8 G/DL (ref 31–37)
MCV RBC AUTO: 82.4 FL
MONOCYTES # BLD AUTO: 0.67 X10(3) UL (ref 0.1–1)
MONOCYTES NFR BLD AUTO: 6.8 %
NEUTROPHILS # BLD AUTO: 6.11 X10 (3) UL (ref 1.5–7.7)
NEUTROPHILS # BLD AUTO: 6.11 X10(3) UL (ref 1.5–7.7)
NEUTROPHILS NFR BLD AUTO: 62.1 %
OSMOLALITY SERPL CALC.SUM OF ELEC: 287 MOSM/KG (ref 275–295)
PLATELET # BLD AUTO: 311 10(3)UL (ref 150–450)
POTASSIUM SERPL-SCNC: 3.8 MMOL/L (ref 3.5–5.1)
PROT SERPL-MCNC: 7.3 G/DL (ref 6.4–8.2)
RBC # BLD AUTO: 4.99 X10(6)UL
RETICS # AUTO: 49.4 X10(3) UL (ref 22.5–147.5)
RETICS/RBC NFR AUTO: 1 %
SODIUM SERPL-SCNC: 140 MMOL/L (ref 136–145)
WBC # BLD AUTO: 9.8 X10(3) UL (ref 4–11)

## 2024-04-01 PROCEDURE — 85025 COMPLETE CBC W/AUTO DIFF WBC: CPT | Performed by: INTERNAL MEDICINE

## 2024-04-01 PROCEDURE — 80053 COMPREHEN METABOLIC PANEL: CPT | Performed by: INTERNAL MEDICINE

## 2024-04-01 PROCEDURE — 83010 ASSAY OF HAPTOGLOBIN QUANT: CPT | Performed by: INTERNAL MEDICINE

## 2024-04-01 PROCEDURE — 36415 COLL VENOUS BLD VENIPUNCTURE: CPT

## 2024-04-01 PROCEDURE — 83615 LACTATE (LD) (LDH) ENZYME: CPT | Performed by: INTERNAL MEDICINE

## 2024-04-01 PROCEDURE — 99211 OFF/OP EST MAY X REQ PHY/QHP: CPT

## 2024-04-01 PROCEDURE — 85045 AUTOMATED RETICULOCYTE COUNT: CPT | Performed by: INTERNAL MEDICINE

## 2024-04-01 PROCEDURE — 85397 CLOTTING FUNCT ACTIVITY: CPT | Performed by: INTERNAL MEDICINE

## 2024-04-01 NOTE — PROGRESS NOTES
Hematology Clinic Follow Up Visit    Patient Name: Jeramy Amaya  Medical Record Number: [id number]   YOB: 1990   PCP: Dr. Zelda Padilla    Reason for Consultation:  Jeramy Amaya was seen today for the diagnosis of TTP    *TTP  -11/24/16- wbc 6.6, hgb 12.4, MCV 82, plt 252  -10/24/18- wbc 11.8, hgb 11.5, MCV 85, plt 266  -2/1/19- wbc 10.4, hgb 11.8, MCV 83, plt 253  -1/1/21- wbc 539, hgb 10.8, MCV 79, plt 15  -1/2/21- wbc 5.7, hgb 9.8, MCV 80, plt 10, , hapto <7, retic 1.7%, ROSALES neg                -> IVIG 1g/kg, dexamethasone 40mg daily X 4 doses started  -1/3/20- hgb 8.8, plt 15, , some schistos on smear                -repeated 2nd dose of IVIG 1g/kg  -1/3/21- XLIRXS78 activity <5%  -1/4/21- bmbx- normocellular with active trilineage hematopoiesis.  No increase in blasts or significant dysplastic changes.  -1/7/21- ZPKIXN42 activity 25%, YZGMWX11 inhibitor present at 1.7 BU, hgb 9.9, plt 141,   -1/7/21- 1/11/21- PEX daily x 5. Steroids resumed.  -1/8/21- 1/28/21- rituximab 375mg/m2 IV weekly x 4  -1/11/21- hgb 9.6, plt 290,   -1/14/21- hgb 10.5, plt 472, , TVVRQV00 activity 39%  -1/21/21- KWQCUF58 activity 88%  -1/23/21- completed steroid taper  -1/28/21- hgb 11.7, plt 223, , BPJYGS31 activity 83%  -2/11/21- hgb 11.6, plt 335, , RGTJUC94 activity 80%  -3/11/21- hgb 13.1, plt 306, , ZFQOZS62 activity 75%  -4/8/21- hgb 13.0, plt 294, , ZJRRLA94 activity 72%  -7/8/21- hgb 13.6, plt 273, , SXOGMB67 activity 62%  -9/30/21- hgb 13.2, plt 273, , YTNUXE17 activity 70%  -12/22/21- hgb 13.1, plt 254, , PTWPSV63 activity 73%  -3/23/22- hgb 13.7, plt 255, , RLNWZA05 activity 83%  -6/22/22- hgb 13.6, plt 297, , VEECGB69 activity 67%  -9/21/22- hgb 13.5, plt 244, , PKGBXU44 activity 97%  -12/29/22- hgb 14.0, plt 288, , WYXUKC02 activity >100%   *-became pregnant  -3/29/23- hgb 12.6,  plt 305, , FANOWL48 activity 93%  -23- hgb 13.0, plt 287, , CVWHSC76 activity >100%  -23- hgb 12.3, plt 286, , VXKIID84 activity >100%  -23- hgb 11.5, plt 298, , FWKZCD26 activity >100%  -23- hgb 11.4, plt 285, , TVZFBG85 activity 85%, ferritin 15, TSAT 17%  -23- hgb 12.0, plt 254, , VIDYJK54 activity 89%  -> IV INFeD 1000mg  -10/19/23- hgb 12.9, plt 234, , YPDRON64 activity 84%  -23- hgb 12.5, plt 242, , PETMZO39 activity 96%  -23- *vaginal delivery of  daughter at full term  -23- hgb 14.7, plt 327, , UQBORU07 activity 95%  -24- hgb 13.9, plt 311, , GPXJST08 activity PENDING    =============================  Interval events: Presents for follow-up.  She reports feeling well overall. Has some fatigue which she attributes to being busy with her young kids and work.  She has been back to work the last several weeks.  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  She states this will be closer to her home and more flexible with hours.     She has not had any abnormal bleeding or bruising.  No leg swelling, increased dyspnea, or chest pain.  No headaches or mental status changes.   No recent fevers or infections but feels she may be starting to come down with something- has some \"itching behind her nose\".  Her kids have a mild viral illness.  She denies any cough, congestion, or sorethroat.     Past Medical History:  Past Medical History:   Diagnosis Date    Conductive hearing loss in left ear 2022    Hair loss 2021    History of migraine 2023    Intraventricular cyst of brain 2016    Migraines     Otosclerosis     Otosclerosis of left ear 2021    TTP (thrombotic thrombocytopenic purpura) ([facility]) 2021    Visual impairment     glasses     Past Surgical History:    Procedure Laterality Date    EXPLORATION OF MIDDLE EAR Left 2022     Home Medications:   ibuprofen 600 MG Oral Tab Take 1 tablet (600 mg total) by mouth every 6 (six) hours as needed for Pain. 20 tablet 0    loratadine 10 MG Oral Tab Take 1 tablet (10 mg total) by mouth daily as needed.      Blood Glucose Monitoring Suppl (CONTOUR NEXT GEN MONITOR) w/Device Does not apply Kit 1 kit As Directed. 1 kit 0    Microlet Lancets Does not apply Misc Check blood sugar 4 times daily 200 each 3    Omega 3-6-9 Fatty Acids (OMEGA DHA OR) Take by mouth.      Prenatal Vit-Fe Fumarate-FA (PRENATAL ONE DAILY) 27-0.8 MG Oral Tab Take 1 tablet by mouth daily.      Cholecalciferol (VITAMIN D3) 2000 units Oral Chew Tab Chew 2,000 Units by mouth daily.       Allergies:   No Known Allergies    Psychosocial History:  Social History     Social History Narrative    , has 2 daughters ( and 3 y/o as of 2023).  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     Social History     Socioeconomic History    Marital status:    Tobacco Use    Smoking status: Never     Passive exposure: Never    Smokeless tobacco: Never   Vaping Use    Vaping Use: Never used   Substance and Sexual Activity    Alcohol use: No     Alcohol/week: 0.0 standard drinks of alcohol    Drug use: No    Sexual activity: Not Currently     Partners: Male   Other Topics Concern    Caffeine Concern Yes     Comment: 1-2 cups of tea but normally just 1 cup    Exercise Yes     Comment: no regular regimen    Seat Belt Yes   Social History Narrative    , has 2 daughters ( and 3 y/o as of 2023).  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     Social Determinants of Health     Financial Resource Strain: Low Risk  (2023)    Financial Resource Strain     Difficulty of Paying Living Expenses: Not hard at all     Med Affordability: No   Food Insecurity: No Food Insecurity (2023)    Food Insecurity      Food Insecurity: Never true   Transportation Needs: No Transportation Needs (11/22/2023)    Transportation Needs     Lack of Transportation: No   Stress: No Stress Concern Present (11/22/2023)    Stress     Feeling of Stress : No   Housing Stability: Low Risk  (11/22/2023)    Housing Stability     Housing Instability: No       Family Medical History:  Family History   Problem Relation Age of Onset    Hypertension Paternal Grandmother     Diabetes Paternal Grandfather     Lipids Father     Cancer Neg     Blood Disorder Neg      Review of Systems:  A 10-point ROS was done with pertinent positives and negative per the HPI    Vital Signs:  Height: --  Weight: 59.9 kg (132 lb) (04/01 1426)  BSA (Calculated - sq m): --  Pulse: 85 (04/01 1426)  BP: 107/71 (04/01 1426)  Temp: 97.8 °F (36.6 °C) (04/01 1426)  Do Not Use - Resp Rate: --  SpO2: 100 % (04/01 1426)     Wt Readings from Last 6 Encounters:   04/01/24 59.9 kg (132 lb)   01/05/24 61.2 kg (135 lb)   12/18/23 60.8 kg (134 lb)   11/22/23 65.8 kg (145 lb)   11/21/23 65.8 kg (145 lb)   11/21/23 66.5 kg (146 lb 9.6 oz)     Physical Examination:  General: Patient is alert and oriented, no distress  Psych: Mood and affect are appropriate  Eyes: EOMI  ENT: 1 small hyperpigmented petechial appearing lesion at right soft palate that has been there for years and is unchanged.  No wet purpura or palatal petechia noted.  CV: regular rate and rhythm, no murmurs, no LE edema  Respiratory: Lungs clear to auscultation bilaterally  GI/Abd: Soft, non-tender with normoactive bowel sounds, no hepatosplenomegaly  Neurological: Grossly intact   Lymphatics: No palpable lymphadenopathy  Skin: No petechiae.     Laboratory:  Lab Results   Component Value Date    WBC 9.8 04/01/2024    WBC 8.8 12/18/2023    WBC 10.7 11/26/2023    HGB 13.9 04/01/2024    HGB 14.7 12/18/2023    HGB 13.1 11/26/2023    HCT 41.1 04/01/2024    MCV 82.4 04/01/2024    MCH 27.9 04/01/2024    MCHC 33.8 04/01/2024    RDW  12.5 04/01/2024    .0 04/01/2024    .0 12/18/2023    .0 11/26/2023     Lab Results   Component Value Date    GLU 82 04/01/2024    BUN 8 (L) 04/01/2024    BUNCREA 16.7 07/08/2021    CREATSERUM 0.72 04/01/2024    CREATSERUM 0.90 12/18/2023    CREATSERUM 0.53 (L) 11/13/2023    ANIONGAP 7 04/01/2024     11/24/2016    GFRNAA 103 06/22/2022    GFRAA 119 06/22/2022    CA 9.5 04/01/2024    OSMOCALC 287 04/01/2024    ALKPHO 81 04/01/2024    AST 31 04/01/2024    ALT 50 04/01/2024    BILT 0.2 04/01/2024    TP 7.3 04/01/2024    ALB 3.5 04/01/2024    GLOBULIN 3.8 04/01/2024     04/01/2024    K 3.8 04/01/2024     04/01/2024    CO2 25.0 04/01/2024     Lab Results   Component Value Date    PTT 37.1 (H) 01/01/2021    INR 1.02 01/01/2021     Lab Results   Component Value Date    REITCPERCENT 1.0 04/01/2024    REITCPERCENT 0.6 12/18/2023    REITCPERCENT 1.6 11/13/2023    REITCPERCENT 1.4 10/19/2023    REITCPERCENT 1.3 09/21/2023    REITCPERCENT 1.6 08/17/2023    REITCPERCENT 1.3 07/13/2023    REITCPERCENT 0.8 05/24/2023    REITCPERCENT 0.9 04/26/2023    REITCPERCENT 1.3 03/29/2023    REITCPERCENT 0.8 12/29/2022    REITCPERCENT 0.5 09/21/2022    REITCPERCENT 0.7 06/22/2022    REITCPERCENT 1.0 03/23/2022    REITCPERCENT 0.9 12/22/2021    REITCPERCENT 0.8 09/30/2021    REITCPERCENT 0.7 07/08/2021    REITCPERCENT 0.7 04/08/2021    REITCPERCENT 0.8 03/11/2021    REITCPERCENT 1.3 02/11/2021    REITCPERCENT 1.7 01/28/2021    REITCPERCENT 3.4 (H) 01/21/2021    REITCPERCENT 4.7 (H) 01/14/2021    REITCPERCENT 6.5 (H) 01/07/2021    REITCPERCENT 3.7 (H) 01/04/2021    REITCPERCENT 1.7 01/02/2021     Lab Results   Component Value Date    RETHE 31.4 04/01/2024    RETHE 33.7 12/18/2023    RETHE 34.0 11/13/2023    RETHE 32.8 10/19/2023    RETHE 32.2 09/21/2023    RETHE 34.4 08/17/2023    RETHE 33.2 07/13/2023    RETHE 31.8 05/24/2023    RETHE 34.4 04/26/2023    RETHE 33.2 03/29/2023    RETHE 32.0 12/29/2022     RETHE 32.5 09/21/2022    RETHE 33.3 06/22/2022    RETHE 32.3 03/23/2022    RETHE 31.8 12/22/2021    RETHE 32.8 09/30/2021    RETHE 33.2 07/08/2021    RETHE 31.9 04/08/2021    RETHE 32.9 03/11/2021    RETHE 32.9 02/11/2021    RETHE 34.9 01/28/2021    RETHE 34.4 01/21/2021    RETHE 38.6 (H) 01/14/2021    RETHE 37.2 (H) 01/07/2021    RETHE 36.3 01/04/2021    RETHE 35.6 01/02/2021     Lab Results   Component Value Date    .3 (H) 12/18/2023    GUY 15.2 08/17/2023    GUY 45.6 01/28/2021    .6 (H) 01/02/2021     Lab Results   Component Value Date    SAT 19 12/18/2023    SAT 17 08/17/2023    SAT 45 01/02/2021     Soluble transferrin receptor  No results found for: \"STFRNR\"  Lab Results   Component Value Date    B12 632 01/03/2021     No results found for: \"MMA\"  Lab Results   Component Value Date    FOLIC 19.4 01/02/2021     Lab Results   Component Value Date    COPPER 99.8 01/02/2021     Lab Results   Component Value Date    ESRML 15 01/02/2021     Lab Results   Component Value Date    CRP <0.29 01/02/2021     Lab Results   Component Value Date     04/01/2024     (H) 12/18/2023     11/13/2023     10/19/2023     09/21/2023     08/17/2023     07/13/2023     05/24/2023     04/26/2023     03/29/2023     12/29/2022     09/21/2022     06/22/2022     03/23/2022     12/22/2021     09/30/2021     07/08/2021     04/08/2021     03/11/2021     02/11/2021     01/28/2021     01/21/2021     01/14/2021     01/11/2021     01/10/2021     01/09/2021     (H) 01/08/2021     (H) 01/07/2021     (H) 01/07/2021     (H) 01/05/2021     (H) 01/04/2021     (H) 01/03/2021     (H) 01/02/2021     (H) 01/01/2021     Lab Results   Component Value Date    HAPT 138.0 04/01/2024    HAPT 150.0 12/18/2023    HAPT  71.8 11/13/2023    HAPT 69.2 10/19/2023    HAPT 68.5 09/21/2023    HAPT 81.2 08/17/2023    HAPT 94.5 07/13/2023    HAPT 107.0 05/24/2023    HAPT 95.0 04/26/2023    HAPT 117.0 03/29/2023    HAPT 103.0 12/29/2022    HAPT 100.0 09/21/2022    HAPT 100.0 06/22/2022    HAPT 86.3 03/23/2022    HAPT 92.8 12/22/2021    HAPT 91.3 09/30/2021    HAPT 76.8 07/08/2021    HAPT 70.8 04/08/2021    HAPT 72.6 03/11/2021    HAPT 69.7 02/11/2021    HAPT 76.2 01/28/2021    HAPT 58.4 01/21/2021    HAPT 79.3 01/14/2021    HAPT <7.8 (L) 01/07/2021    HAPT <7.8 (L) 01/02/2021     No results found for: \"ELECTMS1\"  Lab Results   Component Value Date    KAPPALTCHN 3.110 (H) 01/02/2021      Lab Results   Component Value Date    LAMBDALTCH 1.813 01/02/2021     Lab Results   Component Value Date    KAPLAMRATIO 1.72 (H) 01/02/2021     Lab Results   Component Value Date    TSH 3.580 06/22/2022    TSH 1.220 04/08/2021    TSH 0.610 01/09/2021     Lab Results   Component Value Date    T4F 1.1 06/22/2022    T4F 1.0 01/09/2021     No results found for: \"T3TOT\"     Component      Latest Ref Rng & Units 1/3/2021   % PNH RBC      0.000 - 0.004 % 0.002   % P[facility] MONOCYTES      0.000 - 0.019 % 0.000   % PNH PMN      0.000 - 0.004 % 0.000   FCTDGL82 ACTIVITY      >=61 % <5 (L)   HEMOSIDERIN, URINE      Absent Absent     Component      Latest Ref Rng & Units 1/2/2021   HELICOBACTER PYLORI AG FECAL      Negative Negative   HCV AB      Nonreactive  Nonreactive   ROSALES Poly       Negative   HIV Antigen Antibody Combo      Non-Reactive Non-Reactive     Component      Latest Ref Rng & Units 9/30/2021   SARS-CoV-2 Total Antibody       Reactive     Lab Results   Component Value Date    NNHVBT35AJP 95.4 12/18/2023    EULHWA39BYK 96.4 11/13/2023    WYNFGC80SGB 84.8 10/19/2023    JVLOUB05KTP 89.3 09/21/2023    SOVZUQ60ZKC 85.2 08/17/2023    FKRKWD78YBY >100.0 07/13/2023    DNQHCC80FHT >100.0 05/24/2023    UVYQNX41MKR >100.0 04/26/2023    IITCBB95XHL 93 03/29/2023     ZEISBV91OVR >100 12/29/2022    UARVTT91OEJ 97 09/21/2022    WVJJGM68YIG 67 06/22/2022    VSYWJU70ZFB 83 03/23/2022    FLWVZO08WSO 73 12/22/2021    EKNFRT49AJY 70 09/30/2021    GCRFUI80EZV 62 07/08/2021    OPDVOA21FEK 72 04/08/2021    CHEIPV46WOU 75 03/11/2021    BXSCLO57DJV 80 02/11/2021    VEUNQL01RJS 83 01/28/2021    EFNEYJ71CGN 88 01/21/2021    YMEGFE74REX 39 (L) 01/14/2021    MVAPLE93WKQ <5 (L) 01/03/2021     Imaging:    U/S abd 1/4/21: CONCLUSION:  There is no splenomegaly or liver abnormality.  There is mild prominence right renal collecting system which is most likely an extrarenal pelvis.    Impression & Plan:     *acquired Thrombotic thrombocytopenic purpura (aTTP)  -s/p PEX x 5 1/7-1/11/21 with initiation of steroids and rituximab with good response.  Has weaned steroids as of 1/23/21.  Completed Rituximab weekly x4 1/8/21-1/28/21.  -No evidence of recurrence at this time, though waiting on repeat DCATNC40 activity level today which is pending.  As long as her IXJMEW18 level remains above 10-20% we will continue surveillance monitoring only as below:   -CBC w/ diff, CMP, LDH, haptoglobin, reticulocyte count, and EHCNLQ58 activity q3 mths, if remains without any evidence of recurrence at next visit in 3 months will consider extending surveillance interval to q6 months.     *Normocytic anemia  -previously d/t hemolysis from TTP with resolution and recent ABBI related to pregnancy  -s/p IV iron replacement on 9/21/23 with improvement.  -Repeat CBC, LDH, hapto, reticulocyte count monitoring as above.      *left ear hearing loss d/t otosclerosis  -following with ENT, Dr. Cortney Kendall and is also seeing Dr. Mendoza at [place].  First attempt at stapedectomy was aborted due to facial nerve overlying stapes, a different surgical approach may be considered with Dr. Mendoza but [facility] plans to hold off on this for now.      RTC in 3 months     Andrew Garcia MD  Hematology/Medical Oncology  [facility]

## 2024-04-01 NOTE — PROGRESS NOTES
Education Record     Learner:  Patient     Disease / Diagnosis: TTP     Barriers / Limitations:  None                Comments:     Method:  Discussion                Comments:     General Topics:  Plan of care reviewed                Comments:     Outcome:  Shows understanding                Comments: 3 month MD f/up. Pt states she has been feeling well. Denies any issues or concerns.

## 2024-04-03 LAB — ADAMTS13 ACTIVITY: 88.1 %

## 2024-04-09 ENCOUNTER — TELEPHONE (OUTPATIENT)
Dept: INTERNAL MEDICINE CLINIC | Facility: CLINIC | Age: 34
End: 2024-04-09

## 2024-04-09 DIAGNOSIS — Z11.1 SCREENING FOR TUBERCULOSIS: Primary | ICD-10-CM

## 2024-04-09 NOTE — TELEPHONE ENCOUNTER
LOV 1/3/23 with TB for CPE.  Future appt with TB on 7/11/24.    Pt asking for a [facility] lab which is a request from her employer.  TB, will you order for pt?

## 2024-04-09 NOTE — TELEPHONE ENCOUNTER
Pt needing TB blood test for new employer [employer]. She is needing this by the end of next week. Ok to place order? She doesn't have a form that needs to be completed.

## 2024-04-17 ENCOUNTER — LAB ENCOUNTER (OUTPATIENT)
Dept: LAB | Age: 34
End: 2024-04-17
Attending: INTERNAL MEDICINE
Payer: COMMERCIAL

## 2024-04-17 DIAGNOSIS — Z11.1 SCREENING FOR TUBERCULOSIS: ICD-10-CM

## 2024-04-17 PROCEDURE — 86480 TB TEST CELL IMMUN MEASURE: CPT | Performed by: INTERNAL MEDICINE

## 2024-04-19 LAB
M TB IFN-G CD4+ T-CELLS BLD-ACNC: 0.03 IU/ML
M TB TUBERC IFN-G BLD QL: NEGATIVE
M TB TUBERC IGNF/MITOGEN IGNF CONTROL: >10 IU/ML
QFT TB1 AG MINUS NIL: 0.01 IU/ML
QFT TB2 AG MINUS NIL: 0 IU/ML

## 2024-07-03 ENCOUNTER — OFFICE VISIT (OUTPATIENT)
Dept: HEMATOLOGY/ONCOLOGY | Facility: HOSPITAL | Age: 34
End: 2024-07-03
Attending: INTERNAL MEDICINE
Payer: COMMERCIAL

## 2024-07-03 VITALS
DIASTOLIC BLOOD PRESSURE: 72 MMHG | BODY MASS INDEX: 23 KG/M2 | TEMPERATURE: 99 F | SYSTOLIC BLOOD PRESSURE: 109 MMHG | HEART RATE: 96 BPM | OXYGEN SATURATION: 98 % | WEIGHT: 124 LBS | RESPIRATION RATE: 16 BRPM

## 2024-07-03 DIAGNOSIS — R71.8 MICROCYTOSIS: ICD-10-CM

## 2024-07-03 DIAGNOSIS — D64.9 NORMOCYTIC ANEMIA: ICD-10-CM

## 2024-07-03 DIAGNOSIS — M31.19 TTP (THROMBOTIC THROMBOCYTOPENIC PURPURA) (HCC): Primary | ICD-10-CM

## 2024-07-03 LAB
ALBUMIN SERPL-MCNC: 3.6 G/DL (ref 3.4–5)
ALBUMIN/GLOB SERPL: 0.9 {RATIO} (ref 1–2)
ALP LIVER SERPL-CCNC: 64 U/L
ALT SERPL-CCNC: 35 U/L
ANION GAP SERPL CALC-SCNC: 8 MMOL/L (ref 0–18)
AST SERPL-CCNC: 13 U/L (ref 15–37)
BASOPHILS # BLD AUTO: 0.03 X10(3) UL (ref 0–0.2)
BASOPHILS NFR BLD AUTO: 0.3 %
BILIRUB SERPL-MCNC: 0.4 MG/DL (ref 0.1–2)
BUN BLD-MCNC: 11 MG/DL (ref 9–23)
CALCIUM BLD-MCNC: 8.6 MG/DL (ref 8.5–10.1)
CHLORIDE SERPL-SCNC: 109 MMOL/L (ref 98–112)
CO2 SERPL-SCNC: 20 MMOL/L (ref 21–32)
CREAT BLD-MCNC: 0.8 MG/DL
DEPRECATED HBV CORE AB SER IA-ACNC: 110.7 NG/ML
EGFRCR SERPLBLD CKD-EPI 2021: 98 ML/MIN/1.73M2 (ref 60–?)
EOSINOPHIL # BLD AUTO: 0.07 X10(3) UL (ref 0–0.7)
EOSINOPHIL NFR BLD AUTO: 0.8 %
ERYTHROCYTE [DISTWIDTH] IN BLOOD BY AUTOMATED COUNT: 13.2 %
FASTING STATUS PATIENT QL REPORTED: NO
GLOBULIN PLAS-MCNC: 3.8 G/DL (ref 2.8–4.4)
GLUCOSE BLD-MCNC: 126 MG/DL (ref 70–99)
HAPTOGLOB SERPL-MCNC: 87.5 MG/DL (ref 30–200)
HCT VFR BLD AUTO: 39.5 %
HGB BLD-MCNC: 13.9 G/DL
HGB RETIC QN AUTO: 32.8 PG (ref 28.2–36.6)
IMM GRANULOCYTES # BLD AUTO: 0.03 X10(3) UL (ref 0–1)
IMM GRANULOCYTES NFR BLD: 0.3 %
IMM RETICS NFR: 0.01 RATIO (ref 0.1–0.3)
LDH SERPL L TO P-CCNC: 152 U/L
LYMPHOCYTES # BLD AUTO: 2.52 X10(3) UL (ref 1–4)
LYMPHOCYTES NFR BLD AUTO: 29.2 %
MCH RBC QN AUTO: 27.9 PG (ref 26–34)
MCHC RBC AUTO-ENTMCNC: 35.2 G/DL (ref 31–37)
MCV RBC AUTO: 79.2 FL
MONOCYTES # BLD AUTO: 0.59 X10(3) UL (ref 0.1–1)
MONOCYTES NFR BLD AUTO: 6.8 %
NEUTROPHILS # BLD AUTO: 5.38 X10 (3) UL (ref 1.5–7.7)
NEUTROPHILS # BLD AUTO: 5.38 X10(3) UL (ref 1.5–7.7)
NEUTROPHILS NFR BLD AUTO: 62.6 %
OSMOLALITY SERPL CALC.SUM OF ELEC: 285 MOSM/KG (ref 275–295)
PLATELET # BLD AUTO: 281 10(3)UL (ref 150–450)
POTASSIUM SERPL-SCNC: 3.7 MMOL/L (ref 3.5–5.1)
PROT SERPL-MCNC: 7.4 G/DL (ref 6.4–8.2)
RBC # BLD AUTO: 4.99 X10(6)UL
RETICS # AUTO: 32.9 X10(3) UL (ref 22.5–147.5)
RETICS/RBC NFR AUTO: 0.7 %
SODIUM SERPL-SCNC: 137 MMOL/L (ref 136–145)
WBC # BLD AUTO: 8.6 X10(3) UL (ref 4–11)

## 2024-07-03 PROCEDURE — 99214 OFFICE O/P EST MOD 30 MIN: CPT | Performed by: INTERNAL MEDICINE

## 2024-07-03 NOTE — PROGRESS NOTES
Hematology Clinic Follow Up Visit    Patient Name: Jeramy Amaya  Medical Record Number: [id number]   YOB: 1990   PCP: Dr. Zelda Padilla    Reason for Consultation:  Jeramy Amaya was seen today for the diagnosis of TTP    *TTP  -11/24/16- wbc 6.6, hgb 12.4, MCV 82, plt 252  -10/24/18- wbc 11.8, hgb 11.5, MCV 85, plt 266  -2/1/19- wbc 10.4, hgb 11.8, MCV 83, plt 253  -1/1/21- wbc 539, hgb 10.8, MCV 79, plt 15  -1/2/21- wbc 5.7, hgb 9.8, MCV 80, plt 10, , hapto <7, retic 1.7%, ROSALES neg                -> IVIG 1g/kg, dexamethasone 40mg daily X 4 doses started  -1/3/20- hgb 8.8, plt 15, , some schistos on smear                -repeated 2nd dose of IVIG 1g/kg  -1/3/21- VMDAFU54 activity <5%  -1/4/21- bmbx- normocellular with active trilineage hematopoiesis.  No increase in blasts or significant dysplastic changes.  -1/7/21- RJWPIW25 activity 25%, IZXDSZ34 inhibitor present at 1.7 BU, hgb 9.9, plt 141,   -1/7/21- 1/11/21- PEX daily x 5. Steroids resumed.  -1/8/21- 1/28/21- rituximab 375mg/m2 IV weekly x 4  -1/11/21- hgb 9.6, plt 290,   -1/14/21- hgb 10.5, plt 472, , CPVWWQ16 activity 39%  -1/21/21- RTPWGB92 activity 88%  -1/23/21- completed steroid taper  -1/28/21- hgb 11.7, plt 223, , ZLPDPM21 activity 83%  -2/11/21- hgb 11.6, plt 335, , WNILGU75 activity 80%  -3/11/21- hgb 13.1, plt 306, , ROMMEO85 activity 75%  -4/8/21- hgb 13.0, plt 294, , ACIGBG71 activity 72%  -7/8/21- hgb 13.6, plt 273, , FLUXCR50 activity 62%  -9/30/21- hgb 13.2, plt 273, , UDCBRT20 activity 70%  -12/22/21- hgb 13.1, plt 254, , OUPDIH85 activity 73%  -3/23/22- hgb 13.7, plt 255, , KDZUUZ53 activity 83%  -6/22/22- hgb 13.6, plt 297, , YOEPDM48 activity 67%  -9/21/22- hgb 13.5, plt 244, , XZALIE64 activity 97%  -12/29/22- hgb 14.0, plt 288, , TWLXIS34 activity >100%   *-became pregnant  -3/29/23- hgb 12.6,  plt 305, , FQDOQX18 activity 93%  -23- hgb 13.0, plt 287, , SMBREC45 activity >100%  -23- hgb 12.3, plt 286, , PDYICN35 activity >100%  -23- hgb 11.5, plt 298, , PNJLZZ99 activity >100%  -23- hgb 11.4, plt 285, , KDEHQH43 activity 85%, ferritin 15, TSAT 17%  -23- hgb 12.0, plt 254, , JEDWBQ00 activity 89%  -> IV INFeD 1000mg  -10/19/23- hgb 12.9, plt 234, , DJERWU89 activity 84%  -23- hgb 12.5, plt 242, , GJZUTZ43 activity 96%  -23- *vaginal delivery of  daughter at full term  -23- hgb 14.7, plt 327, , DWZPDI68 activity 95%  -24- hgb 13.9, plt 311, , SLYGNE13 activity 88%  -7/3/24- hgb 13.9, plt 281, , TNFMOH82 activity PENDING    =============================  Interval events: Presents for follow-up.  She reports feeling well overall.  No significant fatigue.  Since last visit she started a new job with [employer], [facility].  She likes this but it is more busy but her hours are more flexible.    She has not had any abnormal bleeding or bruising.  No leg swelling, increased dyspnea, or chest pain.  No headaches or mental status changes.   No recent fevers or infections.    Past Medical History:  Past Medical History:    Conductive hearing loss in left ear    Hair loss    History of migraine    Intraventricular cyst of brain    Migraines    Otosclerosis    Otosclerosis of left ear    TTP (thrombotic thrombocytopenic purpura) (HCC)    Visual impairment    glasses     Past Surgical History:   Procedure Laterality Date    Exploration of middle ear Left 2022     Home Medications:   loratadine 10 MG Oral Tab Take 1 tablet (10 mg total) by mouth daily as needed.      Omega 3-6-9 Fatty Acids (OMEGA DHA OR) Take by mouth.      Prenatal Vit-Fe Fumarate-FA (PRENATAL ONE DAILY) 27-0.8 MG Oral Tab Take 1 tablet by mouth daily.       Cholecalciferol (VITAMIN D3) 2000 units Oral Chew Tab Chew 2,000 Units by mouth daily.       Allergies:   No Known Allergies    Psychosocial History:  Social History     Social History Narrative    , has 2 daughters ( and 3 y/o as of 2023).  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     Social History     Socioeconomic History    Marital status:    Tobacco Use    Smoking status: Never     Passive exposure: Never    Smokeless tobacco: Never   Vaping Use    Vaping status: Never Used   Substance and Sexual Activity    Alcohol use: No     Alcohol/week: 0.0 standard drinks of alcohol    Drug use: No    Sexual activity: Not Currently     Partners: Male   Other Topics Concern    Caffeine Concern Yes     Comment: 1-2 cups of tea but normally just 1 cup    Exercise Yes     Comment: no regular regimen    Seat Belt Yes   Social History Narrative    , has 2 daughters ( and 3 y/o as of 2023).  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     Social Determinants of Health     Financial Resource Strain: Low Risk  (2023)    Financial Resource Strain     Difficulty of Paying Living Expenses: Not hard at all     Med Affordability: No   Food Insecurity: No Food Insecurity (2023)    Food Insecurity     Food Insecurity: Never true   Transportation Needs: No Transportation Needs (2023)    Transportation Needs     Lack of Transportation: No   Stress: No Stress Concern Present (2023)    Stress     Feeling of Stress : No   Housing Stability: Low Risk  (2023)    Housing Stability     Housing Instability: No       Family Medical History:  Family History   Problem Relation Age of Onset    Hypertension Paternal Grandmother     Diabetes Paternal Grandfather     Lipids Father     Cancer Neg     Blood Disorder Neg      Review of Systems:  A 10-point ROS was done with pertinent positives and negative per the HPI    Vital Signs:  Height:  --  Weight: 56.2 kg (124 lb) (07/03 1233)  BSA (Calculated - sq m): --  Pulse: 96 (07/03 1233)  BP: 109/72 (07/03 1233)  Temp: 98.7 °F (37.1 °C) (07/03 1233)  Do Not Use - Resp Rate: --  SpO2: 98 % (07/03 1233)     Wt Readings from Last 6 Encounters:   07/03/24 56.2 kg (124 lb)   04/01/24 59.9 kg (132 lb)   01/05/24 61.2 kg (135 lb)   12/18/23 60.8 kg (134 lb)   11/22/23 65.8 kg (145 lb)   11/21/23 65.8 kg (145 lb)     Physical Examination:  General: Patient is alert and oriented, no distress  Psych: Mood and affect are appropriate  Eyes: EOMI  ENT: 1 small hyperpigmented petechial appearing lesion at right soft palate that has been there for years and is unchanged.  No wet purpura or palatal petechia noted.  CV: regular rate and rhythm, no murmurs, no LE edema  Respiratory: Lungs clear to auscultation bilaterally  GI/Abd: Soft, non-tender with normoactive bowel sounds, no hepatosplenomegaly  Neurological: Grossly intact   Lymphatics: No palpable lymphadenopathy  Skin: No petechiae.     Laboratory:  Lab Results   Component Value Date    WBC 8.6 07/03/2024    WBC 9.8 04/01/2024    WBC 8.8 12/18/2023    HGB 13.9 07/03/2024    HGB 13.9 04/01/2024    HGB 14.7 12/18/2023    HCT 39.5 07/03/2024    MCV 79.2 (L) 07/03/2024    MCH 27.9 07/03/2024    MCHC 35.2 07/03/2024    RDW 13.2 07/03/2024    .0 07/03/2024    .0 04/01/2024    .0 12/18/2023     Lab Results   Component Value Date     (H) 07/03/2024    BUN 11 07/03/2024    BUNCREA 16.7 07/08/2021    CREATSERUM 0.80 07/03/2024    CREATSERUM 0.72 04/01/2024    CREATSERUM 0.90 12/18/2023    ANIONGAP 8 07/03/2024     11/24/2016    GFRNAA 103 06/22/2022    GFRAA 119 06/22/2022    CA 8.6 07/03/2024    OSMOCALC 285 07/03/2024    ALKPHO 64 07/03/2024    AST 13 (L) 07/03/2024    ALT 35 07/03/2024    BILT 0.4 07/03/2024    TP 7.4 07/03/2024    ALB 3.6 07/03/2024    GLOBULIN 3.8 07/03/2024     07/03/2024    K 3.7 07/03/2024     07/03/2024     CO2 20.0 (L) 07/03/2024     Lab Results   Component Value Date    PTT 37.1 (H) 01/01/2021    INR 1.02 01/01/2021     Lab Results   Component Value Date    REITCPERCENT 0.7 07/03/2024    REITCPERCENT 1.0 04/01/2024    REITCPERCENT 0.6 12/18/2023    REITCPERCENT 1.6 11/13/2023    REITCPERCENT 1.4 10/19/2023    REITCPERCENT 1.3 09/21/2023    REITCPERCENT 1.6 08/17/2023    REITCPERCENT 1.3 07/13/2023    REITCPERCENT 0.8 05/24/2023    REITCPERCENT 0.9 04/26/2023    REITCPERCENT 1.3 03/29/2023    REITCPERCENT 0.8 12/29/2022    REITCPERCENT 0.5 09/21/2022    REITCPERCENT 0.7 06/22/2022    REITCPERCENT 1.0 03/23/2022    REITCPERCENT 0.9 12/22/2021    REITCPERCENT 0.8 09/30/2021    REITCPERCENT 0.7 07/08/2021    REITCPERCENT 0.7 04/08/2021    REITCPERCENT 0.8 03/11/2021    REITCPERCENT 1.3 02/11/2021    REITCPERCENT 1.7 01/28/2021    REITCPERCENT 3.4 (H) 01/21/2021    REITCPERCENT 4.7 (H) 01/14/2021    REITCPERCENT 6.5 (H) 01/07/2021    REITCPERCENT 3.7 (H) 01/04/2021    REITCPERCENT 1.7 01/02/2021     Lab Results   Component Value Date    RETHE 32.8 07/03/2024    RETHE 31.4 04/01/2024    RETHE 33.7 12/18/2023    RETHE 34.0 11/13/2023    RETHE 32.8 10/19/2023    RETHE 32.2 09/21/2023    RETHE 34.4 08/17/2023    RETHE 33.2 07/13/2023    RETHE 31.8 05/24/2023    RETHE 34.4 04/26/2023    RETHE 33.2 03/29/2023    RETHE 32.0 12/29/2022    RETHE 32.5 09/21/2022    RETHE 33.3 06/22/2022    RETHE 32.3 03/23/2022    RETHE 31.8 12/22/2021    RETHE 32.8 09/30/2021    RETHE 33.2 07/08/2021    RETHE 31.9 04/08/2021    RETHE 32.9 03/11/2021    RETHE 32.9 02/11/2021    RETHE 34.9 01/28/2021    RETHE 34.4 01/21/2021    RETHE 38.6 (H) 01/14/2021    RETHE 37.2 (H) 01/07/2021    RETHE 36.3 01/04/2021    RETHE 35.6 01/02/2021     Lab Results   Component Value Date    .7 07/03/2024    .3 (H) 12/18/2023    GUY 15.2 08/17/2023    GUY 45.6 01/28/2021    .6 (H) 01/02/2021     Lab Results   Component Value Date    SAT 19  12/18/2023    SAT 17 08/17/2023    SAT 45 01/02/2021     Soluble transferrin receptor  No results found for: \"STFRNR\"  Lab Results   Component Value Date    B12 632 01/03/2021     No results found for: \"MMA\"  Lab Results   Component Value Date    FOLIC 19.4 01/02/2021     Lab Results   Component Value Date    COPPER 99.8 01/02/2021     Lab Results   Component Value Date    ESRML 15 01/02/2021     Lab Results   Component Value Date    CRP <0.29 01/02/2021     Lab Results   Component Value Date     07/03/2024     04/01/2024     (H) 12/18/2023     11/13/2023     10/19/2023     09/21/2023     08/17/2023     07/13/2023     05/24/2023     04/26/2023     03/29/2023     12/29/2022     09/21/2022     06/22/2022     03/23/2022     12/22/2021     09/30/2021     07/08/2021     04/08/2021     03/11/2021     02/11/2021     01/28/2021     01/21/2021     01/14/2021     01/11/2021     01/10/2021     01/09/2021     (H) 01/08/2021     (H) 01/07/2021     (H) 01/07/2021     (H) 01/05/2021     (H) 01/04/2021     (H) 01/03/2021     (H) 01/02/2021     (H) 01/01/2021     Lab Results   Component Value Date    HAPT 87.5 07/03/2024    HAPT 138.0 04/01/2024    HAPT 150.0 12/18/2023    HAPT 71.8 11/13/2023    HAPT 69.2 10/19/2023    HAPT 68.5 09/21/2023    HAPT 81.2 08/17/2023    HAPT 94.5 07/13/2023    HAPT 107.0 05/24/2023    HAPT 95.0 04/26/2023    HAPT 117.0 03/29/2023    HAPT 103.0 12/29/2022    HAPT 100.0 09/21/2022    HAPT 100.0 06/22/2022    HAPT 86.3 03/23/2022    HAPT 92.8 12/22/2021    HAPT 91.3 09/30/2021    HAPT 76.8 07/08/2021    HAPT 70.8 04/08/2021    HAPT 72.6 03/11/2021    HAPT 69.7 02/11/2021    HAPT 76.2 01/28/2021    HAPT 58.4 01/21/2021    HAPT 79.3 01/14/2021    HAPT <7.8 (L)  01/07/2021    HAPT <7.8 (L) 01/02/2021     No results found for: \"ELECTMS1\"  Lab Results   Component Value Date    KAPPALTCHN 3.110 (H) 01/02/2021      Lab Results   Component Value Date    LAMBDALTCH 1.813 01/02/2021     Lab Results   Component Value Date    KAPLAMRATIO 1.72 (H) 01/02/2021     Lab Results   Component Value Date    TSH 3.580 06/22/2022    TSH 1.220 04/08/2021    TSH 0.610 01/09/2021     Lab Results   Component Value Date    T4F 1.1 06/22/2022    T4F 1.0 01/09/2021     No results found for: \"T3TOT\"     Component      Latest Ref Rng & Units 1/3/2021   % PNH RBC      0.000 - 0.004 % 0.002   % PNH MONOCYTES      0.000 - 0.019 % 0.000   % PNH PMN      0.000 - 0.004 % 0.000   GVZWQJ55 ACTIVITY      >=61 % <5 (L)   HEMOSIDERIN, URINE      Absent Absent     Component      Latest Ref Rng & Units 1/2/2021   HELICOBACTER PYLORI AG FECAL      Negative Negative   HCV AB      Nonreactive  Nonreactive   ROSALES Poly       Negative   HIV Antigen Antibody Combo      Non-Reactive Non-Reactive     Component      Latest Ref Rng & Units 9/30/2021   SARS-CoV-2 Total Antibody       Reactive     Lab Results   Component Value Date    HMYIYL53SJX 88.1 04/01/2024    NZMDTC96ZNH 95.4 12/18/2023    GZFVGG66OOT 96.4 11/13/2023    DSVPYT37ORC 84.8 10/19/2023    EANIBG66KWD 89.3 09/21/2023    LQOQSO31PGF 85.2 08/17/2023    OBATQU29FBT >100.0 07/13/2023    VUXCOY70HYM >100.0 05/24/2023    IQPNZP06FAS >100.0 04/26/2023    KZNLHJ95QYI 93 03/29/2023    YXHRRT61GOC >100 12/29/2022    QOTIMQ59BGN 97 09/21/2022    WZCCGP08MFX 67 06/22/2022    EZBHAO20JFP 83 03/23/2022    IGORZO33BXW 73 12/22/2021    WMOBOR95QHL 70 09/30/2021    SLOQOP08ESQ 62 07/08/2021    WZYKBN14OCL 72 04/08/2021    HRVBFX21ZIW 75 03/11/2021    EMHUSZ45KQO 80 02/11/2021    SKXWRR79RLS 83 01/28/2021    MTBUMM52DUN 88 01/21/2021    DIZSLL66YKL 39 (L) 01/14/2021    BEPQIF84NHS <5 (L) 01/03/2021     Imaging:    U/S abd 1/4/21: CONCLUSION:  There is no splenomegaly or liver  abnormality.  There is mild prominence right renal collecting system which is most likely an extrarenal pelvis.    Impression & Plan:     *acquired Thrombotic thrombocytopenic purpura (aTTP)  -s/p PEX x 5 1/7-1/11/21 with initiation of steroids and rituximab with good response.  Has weaned steroids as of 1/23/21.  Completed Rituximab weekly x4 1/8/21-1/28/21.  -No evidence of recurrence at this time, though waiting on repeat FKGYKX56 activity level today which is pending.  As long as her FMLPGD51 level remains above 10-20% we will continue surveillance monitoring only, given stability will increase interval as below:   -CBC w/ diff, CMP, LDH, haptoglobin, reticulocyte count, and RDMKYC27 activity q6 mths    *Normocytic anemia  -previously d/t hemolysis from TTP with resolution and recent ABBI related to pregnancy  -s/p IV iron replacement on 9/21/23 with improvement.  -Not anemic today but developed microcytosis and therefore iron level checked but is normal today.  -Repeat CBC, LDH, hapto, reticulocyte count monitoring as above.      *left ear hearing loss d/t otosclerosis  -following with ENT, Dr. Cortney Kendall and is also seeing Dr. Mendoza at [facility].  First attempt at stapedectomy was aborted due to facial nerve overlying stapes, a different surgical approach may be considered with Dr. Mendoza but Maddy plans to hold off on this for now.      RTC in 6 months     Andrew Garcia MD  Hematology/Medical Oncology  [facility]

## 2024-07-08 ENCOUNTER — PATIENT MESSAGE (OUTPATIENT)
Dept: INTERNAL MEDICINE CLINIC | Facility: CLINIC | Age: 34
End: 2024-07-08

## 2024-07-08 DIAGNOSIS — Z00.00 ROUTINE GENERAL MEDICAL EXAMINATION AT A HEALTH CARE FACILITY: Primary | ICD-10-CM

## 2024-07-08 DIAGNOSIS — Z13.220 SCREENING FOR LIPID DISORDERS: ICD-10-CM

## 2024-07-08 DIAGNOSIS — Z13.29 SCREENING FOR THYROID DISORDER: ICD-10-CM

## 2024-07-08 LAB — ADAMTS13 ACTIVITY: >100 %

## 2024-07-09 NOTE — TELEPHONE ENCOUNTER
From: Jeramy Amaya  To: Zelda Padilla  Sent: 2024 7:18 AM CDT  Subject: Blood test for upcoming appt     Hi Dr. Padilla,     I reached out to your office on Friday regarding orders for blood test for my upcoming annual physical appt on  regarding myself and my . I was told by the answering person that the orders are in and we can go get blood test. But when we went to [facility] lab on Saturday morning, we were told they don’t see any orders. Could you please follow up on it? If you can place orders today, then we can get blood test done by tomorrow morning. For both me and my  (Malcolm Frausto, : 10/24/1985) as we both have appt together on 24.     Thanks,   Jeramy

## 2024-07-11 ENCOUNTER — OFFICE VISIT (OUTPATIENT)
Dept: INTERNAL MEDICINE CLINIC | Facility: CLINIC | Age: 34
End: 2024-07-11
Payer: COMMERCIAL

## 2024-07-11 VITALS
HEIGHT: 62 IN | HEART RATE: 81 BPM | WEIGHT: 123 LBS | OXYGEN SATURATION: 100 % | BODY MASS INDEX: 22.63 KG/M2 | SYSTOLIC BLOOD PRESSURE: 116 MMHG | DIASTOLIC BLOOD PRESSURE: 64 MMHG

## 2024-07-11 DIAGNOSIS — Z86.32 HISTORY OF GESTATIONAL DIABETES: ICD-10-CM

## 2024-07-11 DIAGNOSIS — M31.19 TTP (THROMBOTIC THROMBOCYTOPENIC PURPURA) (HCC): ICD-10-CM

## 2024-07-11 DIAGNOSIS — Z00.00 ROUTINE GENERAL MEDICAL EXAMINATION AT A HEALTH CARE FACILITY: Primary | ICD-10-CM

## 2024-07-11 PROCEDURE — 99395 PREV VISIT EST AGE 18-39: CPT | Performed by: INTERNAL MEDICINE

## 2024-07-11 PROCEDURE — 3008F BODY MASS INDEX DOCD: CPT | Performed by: INTERNAL MEDICINE

## 2024-07-11 PROCEDURE — 3078F DIAST BP <80 MM HG: CPT | Performed by: INTERNAL MEDICINE

## 2024-07-11 PROCEDURE — 3074F SYST BP LT 130 MM HG: CPT | Performed by: INTERNAL MEDICINE

## 2024-07-11 NOTE — PROGRESS NOTES
Chief Complaint   Patient presents with    Well Adult     Room 4 ac  physical       HPI:    Pleasant  patient here for CPE  She now has 2 kids, may still want one more baby.  She had gestational diabetes with last pregnancy, manageable with diet. H/o TTP - stable labs, follows closely with Dr. Garcia. No bruising or bleeding issues recently.  She has hearing loss from otosclerosis, hearing aid helping, not planning any surgery at this time      Review of Systems   Constitutional: Negative for fever  HENT: Negative for congestion  Eyes: Negative for pain and visual disturbance.   Respiratory: Negative for cough, chest tightness  Cardiovascular: Negative for chest pain  Gastrointestinal: Negative for nausea, vomiting  Genitourinary: Negative for dysuria, hematuria   Skin: Negative for color change and rash.   Neurological: Negative for dizziness, syncope  Hematological: Negative for adenopathy.   Psychiatric/Behavioral: No depression.    Patient Active Problem List   Diagnosis    Thrombocytopenia ([facility])    TTP (thrombotic thrombocytopenic purpura) ([facility])    Transaminitis    Thrombophilia complicating pregnancy, antepartum ([facility])    Normocytic anemia    Gestational diabetes mellitus (GDM) affecting pregnancy ([facility])    Iron deficiency anemia during pregnancy ([facility])    Supervision of other normal pregnancy, antepartum ([facility])    Pregnancy ([facility])     (normal spontaneous vaginal delivery) ([facility])    Uterine contractions ([facility])    Microcytosis       Past Medical History:    Conductive hearing loss in left ear    Hair loss    History of migraine    Intraventricular cyst of brain    Migraines    Otosclerosis    Otosclerosis of left ear    TTP (thrombotic thrombocytopenic purpura) ([facility])    Visual impairment    glasses     Past Surgical History:   Procedure Laterality Date    Exploration of middle ear Left 2022     Family History   Problem Relation Age of Onset    Hypertension Paternal Grandmother     Diabetes Paternal  Grandfather     Lipids Father     Cancer Neg     Blood Disorder Neg      Social History     Socioeconomic History    Marital status:    Tobacco Use    Smoking status: Never     Passive exposure: Never    Smokeless tobacco: Never   Vaping Use    Vaping status: Never Used   Substance and Sexual Activity    Alcohol use: No     Alcohol/week: 0.0 standard drinks of alcohol    Drug use: No    Sexual activity: Not Currently     Partners: Male   Other Topics Concern    Caffeine Concern Yes     Comment: 1-2 cups of tea but normally just 1 cup    Exercise Yes     Comment: no regular regimen    Seat Belt Yes   Social History Narrative    , has 2 daughters ( and 3 y/o as of 2023).  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     Social Determinants of Health     Financial Resource Strain: Low Risk  (2023)    Financial Resource Strain     Difficulty of Paying Living Expenses: Not hard at all     Med Affordability: No   Food Insecurity: No Food Insecurity (2023)    Food Insecurity     Food Insecurity: Never true   Transportation Needs: No Transportation Needs (2023)    Transportation Needs     Lack of Transportation: No   Stress: No Stress Concern Present (2023)    Stress     Feeling of Stress : No   Housing Stability: Low Risk  (2023)    Housing Stability     Housing Instability: No       Current Outpatient Medications   Medication Sig Dispense Refill    loratadine 10 MG Oral Tab Take 1 tablet (10 mg total) by mouth daily as needed.      Omega 3-6-9 Fatty Acids (OMEGA DHA OR) Take by mouth.      Prenatal Vit-Fe Fumarate-FA (PRENATAL ONE DAILY) 27-0.8 MG Oral Tab Take 1 tablet by mouth daily.      Cholecalciferol (VITAMIN D3) 2000 units Oral Chew Tab Chew 2,000 Units by mouth daily.         Allergies  No Known Allergies    Health Maintenance  Immunizations:  Immunization History   Administered Date(s) Administered    Covid-19 Vaccine Pfizer 30 mcg/0.3 ml  08/20/2021, 09/10/2021    Covid-19 Vaccine Pfizer Ben-Sucrose 30 mcg/0.3 ml 06/23/2022    FLULAVAL 6 months & older 0.5 ml Prefilled syringe (52257) 09/21/2018    FLUZONE 6 months and older PFS 0.5 ml (80999) 09/21/2018, 10/13/2021, 10/14/2023    Influenza 11/18/2016, 10/14/2019, 10/21/2022    TDAP 09/15/2018, 11/28/2018, 09/02/2023         Physical Exam  /64   Pulse 81   Ht 5' 2\" (1.575 m)   Wt 123 lb (55.8 kg)   LMP 06/15/2024 (Exact Date)   SpO2 100%   BMI 22.50 kg/m²   Constitutional: Oriented to person, place, and time. No distress.   HEENT:  Normocephalic and atraumatic. Hearing wnl with hearing aids  Eyes: Conjunctivae and EOM are normal. PERRLA. No scleral icterus.   Neck: Normal range of motion. Neck supple.   Cardiovascular: Normal rate, regular rhythm and intact distal pulses.    Pulmonary/Chest: Effort normal and breath sounds normal.   Abdominal: Soft. Bowel sounds are normal. Non tender, ND  Neurological: No cranial nerve deficit or sensory deficit. Normal muscle tone.    Skin: Skin is warm and dry.   Psychiatric: Normal mood and affect.     A/P:    Encounter Diagnoses   Name     Routine general medical examination at a health care facility- She is up to date on pap smear. She will consider covid 19 booster in the fall, she is up to date on other vaccinations.     History of gestational diabetes- hgba1c added to CPE labs, watch diet     H/o TTP (thrombotic thrombocytopenic purpura) (HCC)- stable counts, she follows with Dr. Garcia      Orders Placed This Encounter   Procedures    Hemoglobin A1C [E]       Meds & Refills for this Visit:  Requested Prescriptions      No prescriptions requested or ordered in this encounter       Imaging & Consults:  None      Return in about 1 year (around 7/11/2025), or if symptoms worsen or fail to improve, for physical.    There are no Patient Instructions on file for this visit.    All questions were answered and the patient understands the plan.

## 2024-09-16 ENCOUNTER — PATIENT MESSAGE (OUTPATIENT)
Dept: INTERNAL MEDICINE CLINIC | Facility: CLINIC | Age: 34
End: 2024-09-16

## 2024-09-16 DIAGNOSIS — Z01.84 IMMUNITY STATUS TESTING: ICD-10-CM

## 2024-09-19 NOTE — TELEPHONE ENCOUNTER
From: Jeramy Amaya  To: Zelda Padilla  Sent: 9/16/2024 12:53 PM CDT  Subject: Need orders for titers    Hi Dr. Padilla,    I need to get Measles, Mumps, Varicella and Pertussis titers done for my work. Would you please put the orders for me to get them done at [facility]?    Thanks,  Maddy

## 2024-09-19 NOTE — TELEPHONE ENCOUNTER
Patient last seen 7/11/24 for annual     Please advise if agreeable to titers being drawn that are requested     Please review pended for accuracy prior to signing if agreeable.

## 2024-12-30 ENCOUNTER — OFFICE VISIT (OUTPATIENT)
Age: 34
End: 2024-12-30
Attending: INTERNAL MEDICINE
Payer: COMMERCIAL

## 2024-12-30 VITALS
RESPIRATION RATE: 16 BRPM | HEIGHT: 60.98 IN | SYSTOLIC BLOOD PRESSURE: 109 MMHG | WEIGHT: 127.38 LBS | BODY MASS INDEX: 24.05 KG/M2 | HEART RATE: 96 BPM | TEMPERATURE: 98 F | DIASTOLIC BLOOD PRESSURE: 73 MMHG | OXYGEN SATURATION: 99 %

## 2024-12-30 DIAGNOSIS — M31.19 TTP (THROMBOTIC THROMBOCYTOPENIC PURPURA) (HCC): Primary | ICD-10-CM

## 2024-12-30 PROBLEM — D68.59 THROMBOPHILIA COMPLICATING PREGNANCY, ANTEPARTUM (HCC): Status: RESOLVED | Noted: 2023-03-30 | Resolved: 2024-12-30

## 2024-12-30 PROBLEM — O24.419 GESTATIONAL DIABETES MELLITUS (GDM) AFFECTING PREGNANCY (HCC): Status: RESOLVED | Noted: 2023-08-05 | Resolved: 2024-12-30

## 2024-12-30 PROBLEM — O99.119 THROMBOPHILIA COMPLICATING PREGNANCY, ANTEPARTUM (HCC): Status: RESOLVED | Noted: 2023-03-30 | Resolved: 2024-12-30

## 2024-12-30 LAB
ALBUMIN SERPL-MCNC: 4.3 G/DL (ref 3.2–4.8)
ALBUMIN/GLOB SERPL: 1.5 {RATIO} (ref 1–2)
ALP LIVER SERPL-CCNC: 64 U/L
ALT SERPL-CCNC: 41 U/L
ANION GAP SERPL CALC-SCNC: 8 MMOL/L (ref 0–18)
AST SERPL-CCNC: 31 U/L (ref ?–34)
BASOPHILS # BLD AUTO: 0.04 X10(3) UL (ref 0–0.2)
BASOPHILS NFR BLD AUTO: 0.3 %
BILIRUB SERPL-MCNC: 0.3 MG/DL (ref 0.3–1.2)
BUN BLD-MCNC: 8 MG/DL (ref 9–23)
CALCIUM BLD-MCNC: 9.4 MG/DL (ref 8.7–10.4)
CHLORIDE SERPL-SCNC: 107 MMOL/L (ref 98–112)
CO2 SERPL-SCNC: 25 MMOL/L (ref 21–32)
CREAT BLD-MCNC: 0.74 MG/DL
EGFRCR SERPLBLD CKD-EPI 2021: 108 ML/MIN/1.73M2 (ref 60–?)
EOSINOPHIL # BLD AUTO: 0.13 X10(3) UL (ref 0–0.7)
EOSINOPHIL NFR BLD AUTO: 1 %
ERYTHROCYTE [DISTWIDTH] IN BLOOD BY AUTOMATED COUNT: 12.8 %
GLOBULIN PLAS-MCNC: 2.9 G/DL (ref 2–3.5)
GLUCOSE BLD-MCNC: 115 MG/DL (ref 70–99)
HAPTOGLOB SERPL-MCNC: 86 MG/DL (ref 30–200)
HCT VFR BLD AUTO: 40.1 %
HGB BLD-MCNC: 13.7 G/DL
HGB RETIC QN AUTO: 32.2 PG (ref 28.2–36.6)
IMM GRANULOCYTES # BLD AUTO: 0.04 X10(3) UL (ref 0–1)
IMM GRANULOCYTES NFR BLD: 0.3 %
IMM RETICS NFR: 0.02 RATIO (ref 0.1–0.3)
LDH SERPL L TO P-CCNC: 167 U/L
LYMPHOCYTES # BLD AUTO: 2.52 X10(3) UL (ref 1–4)
LYMPHOCYTES NFR BLD AUTO: 20 %
MCH RBC QN AUTO: 28 PG (ref 26–34)
MCHC RBC AUTO-ENTMCNC: 34.2 G/DL (ref 31–37)
MCV RBC AUTO: 81.8 FL
MONOCYTES # BLD AUTO: 0.79 X10(3) UL (ref 0.1–1)
MONOCYTES NFR BLD AUTO: 6.3 %
NEUTROPHILS # BLD AUTO: 9.07 X10 (3) UL (ref 1.5–7.7)
NEUTROPHILS # BLD AUTO: 9.07 X10(3) UL (ref 1.5–7.7)
NEUTROPHILS NFR BLD AUTO: 72.1 %
OSMOLALITY SERPL CALC.SUM OF ELEC: 289 MOSM/KG (ref 275–295)
PLATELET # BLD AUTO: 281 10(3)UL (ref 150–450)
POTASSIUM SERPL-SCNC: 3.4 MMOL/L (ref 3.5–5.1)
PROT SERPL-MCNC: 7.2 G/DL (ref 5.7–8.2)
RBC # BLD AUTO: 4.9 X10(6)UL
RETICS # AUTO: 29.9 X10(3) UL (ref 22.5–147.5)
RETICS/RBC NFR AUTO: 0.6 %
SODIUM SERPL-SCNC: 140 MMOL/L (ref 136–145)
WBC # BLD AUTO: 12.6 X10(3) UL (ref 4–11)

## 2024-12-30 NOTE — PROGRESS NOTES
Hematology Clinic Follow Up Visit    Patient Name: Jeramy Amaya  Medical Record Number: [id number]   YOB: 1990   PCP: Dr. Zelda Padilla    Reason for Consultation:  Jeramy Amaya was seen today for the diagnosis of TTP    *TTP  -11/24/16- wbc 6.6, hgb 12.4, MCV 82, plt 252  -10/24/18- wbc 11.8, hgb 11.5, MCV 85, plt 266  -2/1/19- wbc 10.4, hgb 11.8, MCV 83, plt 253  -1/1/21- wbc 539, hgb 10.8, MCV 79, plt 15  -1/2/21- wbc 5.7, hgb 9.8, MCV 80, plt 10, , hapto <7, retic 1.7%, ROSALES neg                -> IVIG 1g/kg, dexamethasone 40mg daily X 4 doses started  -1/3/20- hgb 8.8, plt 15, , some schistos on smear                -repeated 2nd dose of IVIG 1g/kg  -1/3/21- SQVCVX61 activity <5%  -1/4/21- bmbx- normocellular with active trilineage hematopoiesis.  No increase in blasts or significant dysplastic changes.  -1/7/21- QIPDRR27 activity 25%, SNNDHQ63 inhibitor present at 1.7 BU, hgb 9.9, plt 141,   -1/7/21- 1/11/21- PEX daily x 5. Steroids resumed.  -1/8/21- 1/28/21- rituximab 375mg/m2 IV weekly x 4  -1/11/21- hgb 9.6, plt 290,   -1/14/21- hgb 10.5, plt 472, , RVEMJM32 activity 39%  -1/21/21- LZOVAP22 activity 88%  -1/23/21- completed steroid taper  -1/28/21- hgb 11.7, plt 223, , KIZTSY36 activity 83%  -2/11/21- hgb 11.6, plt 335, , DQLEHM66 activity 80%  -3/11/21- hgb 13.1, plt 306, , UANXRY59 activity 75%  -4/8/21- hgb 13.0, plt 294, , QLEWEJ56 activity 72%  -7/8/21- hgb 13.6, plt 273, , TOSUNA13 activity 62%  -9/30/21- hgb 13.2, plt 273, , TEHZAM52 activity 70%  -12/22/21- hgb 13.1, plt 254, , TZGWKD80 activity 73%  -3/23/22- hgb 13.7, plt 255, , CQPCPP52 activity 83%  -6/22/22- hgb 13.6, plt 297, , YRNHTH91 activity 67%  -9/21/22- hgb 13.5, plt 244, , OKQKAM93 activity 97%  -12/29/22- hgb 14.0, plt 288, , VYNCLU78 activity >100%   *-became pregnant  -3/29/23- hgb 12.6,  plt 305, , JSBSFP69 activity 93%  -23- hgb 13.0, plt 287, , GWNSCU51 activity >100%  -23- hgb 12.3, plt 286, , ROMKRO80 activity >100%  -23- hgb 11.5, plt 298, , ONLYII08 activity >100%  -23- hgb 11.4, plt 285, , RQQINS30 activity 85%, ferritin 15, TSAT 17%  -23- hgb 12.0, plt 254, , JVMKRF50 activity 89%  -> IV INFeD 1000mg  -10/19/23- hgb 12.9, plt 234, , QOKHHT85 activity 84%  -23- hgb 12.5, plt 242, , HYYUYE56 activity 96%  -23- *vaginal delivery of  daughter at full term  -23- hgb 14.7, plt 327, , XZBBVL78 activity 95%  -24- hgb 13.9, plt 311, , NBUPQB45 activity 88%  -7/3/24- hgb 13.9, plt 281, , UAFMVG50 activity >100%  -24- hgb 13.7, plt 281, , YQAARC22 activity PENDING    =============================  Interval events: Presents for follow-up.  She is doing okay.  She is feeling more tired lately, her kids have been sick and she has been busy with work.  She has not had any recent fevers or infections herself.      She has not had any abnormal bleeding or bruising.  No leg swelling, increased dyspnea, or chest pain.  No headaches or mental status changes.    Past Medical History:  Past Medical History:    Conductive hearing loss in left ear    Hair loss    History of migraine    Intraventricular cyst of brain    Migraines    Otosclerosis    Otosclerosis of left ear    TTP (thrombotic thrombocytopenic purpura) (HCC)    Visual impairment    glasses     Past Surgical History:   Procedure Laterality Date    Exploration of middle ear Left 2022     Home Medications:   loratadine 10 MG Oral Tab Take 1 tablet (10 mg total) by mouth daily as needed.      Omega 3-6-9 Fatty Acids (OMEGA DHA OR) Take by mouth.      Prenatal Vit-Fe Fumarate-FA (PRENATAL ONE DAILY) 27-0.8 MG Oral Tab Take 1 tablet by mouth daily.      Cholecalciferol (VITAMIN D3) 2000 units Oral Chew Tab  Chew 2,000 Units by mouth daily.       Allergies:   No Known Allergies    Psychosocial History:  Social History     Social History Narrative    , has 2 daughters ( and 5 y/o as of 2023).  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     Social History     Socioeconomic History    Marital status:    Tobacco Use    Smoking status: Never     Passive exposure: Never    Smokeless tobacco: Never   Vaping Use    Vaping status: Never Used   Substance and Sexual Activity    Alcohol use: No     Alcohol/week: 0.0 standard drinks of alcohol    Drug use: No    Sexual activity: Not Currently     Partners: Male   Other Topics Concern    Caffeine Concern Yes     Comment: 1-2 cups of tea but normally just 1 cup    Exercise Yes     Comment: no regular regimen    Seat Belt Yes   Social History Narrative    , has 2 daughters ( and 5 y/o as of 2023).  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     Social Drivers of Health     Financial Resource Strain: Low Risk  (2023)    Financial Resource Strain     Difficulty of Paying Living Expenses: Not hard at all     Med Affordability: No   Food Insecurity: No Food Insecurity (2023)    Food Insecurity     Food Insecurity: Never true   Transportation Needs: No Transportation Needs (2023)    Transportation Needs     Lack of Transportation: No   Stress: No Stress Concern Present (2023)    Stress     Feeling of Stress : No   Housing Stability: Low Risk  (2023)    Housing Stability     Housing Instability: No     Family Medical History:  Family History   Problem Relation Age of Onset    Hypertension Paternal Grandmother     Diabetes Paternal Grandfather     Lipids Father     Cancer Neg     Blood Disorder Neg      Review of Systems:  A 10-point ROS was done with pertinent positives and negative per the HPI    Vital Signs:  Height: 154.9 cm (5' 0.98\") ( 1420)  Weight: 57.8 kg (127 lb 6.4 oz)  (12/30 1420)  BSA (Calculated - sq m): 1.56 sq meters (12/30 1420)  Pulse: 96 (12/30 1420)  BP: 109/73 (12/30 1420)  Temp: 97.7 °F (36.5 °C) (12/30 1420)  Do Not Use - Resp Rate: --  SpO2: 99 % (12/30 1420)     Wt Readings from Last 6 Encounters:   12/30/24 57.8 kg (127 lb 6.4 oz)   07/11/24 55.8 kg (123 lb)   07/03/24 56.2 kg (124 lb)   04/01/24 59.9 kg (132 lb)   01/05/24 61.2 kg (135 lb)   12/18/23 60.8 kg (134 lb)     Physical Examination:  General: Patient is alert and oriented, no distress  Psych: Mood and affect are appropriate  Eyes: EOMI  ENT: 1 small hyperpigmented petechial appearing lesion at right soft palate that has been there for years and is unchanged.  No wet purpura or palatal petechia noted.  CV: regular rate and rhythm, no murmurs, no LE edema  Respiratory: Lungs clear to auscultation bilaterally  GI/Abd: Soft, non-tender with normoactive bowel sounds, no hepatosplenomegaly  Neurological: Grossly intact   Lymphatics: No palpable lymphadenopathy  Skin: No petechiae.     Laboratory:  Lab Results   Component Value Date    WBC 12.6 (H) 12/30/2024    WBC 8.6 07/03/2024    WBC 9.8 04/01/2024    HGB 13.7 12/30/2024    HGB 13.9 07/03/2024    HGB 13.9 04/01/2024    HCT 40.1 12/30/2024    MCV 81.8 12/30/2024    MCH 28.0 12/30/2024    MCHC 34.2 12/30/2024    RDW 12.8 12/30/2024    .0 12/30/2024    .0 07/03/2024    .0 04/01/2024     Lab Results   Component Value Date     (H) 12/30/2024    BUN 8 (L) 12/30/2024    BUNCREA 16.7 07/08/2021    CREATSERUM 0.74 12/30/2024    CREATSERUM 0.80 07/03/2024    CREATSERUM 0.72 04/01/2024    ANIONGAP 8 12/30/2024     11/24/2016    GFRNAA 103 06/22/2022    GFRAA 119 06/22/2022    CA 9.4 12/30/2024    OSMOCALC 289 12/30/2024    ALKPHO 64 12/30/2024    AST 31 12/30/2024    ALT 41 12/30/2024    BILT 0.3 12/30/2024    TP 7.2 12/30/2024    ALB 4.3 12/30/2024    GLOBULIN 2.9 12/30/2024     12/30/2024    K 3.4 (L) 12/30/2024      12/30/2024    CO2 25.0 12/30/2024     Lab Results   Component Value Date    PTT 37.1 (H) 01/01/2021    INR 1.02 01/01/2021     Lab Results   Component Value Date    REITCPERCENT 0.6 12/30/2024    REITCPERCENT 0.7 07/03/2024    REITCPERCENT 1.0 04/01/2024    REITCPERCENT 0.6 12/18/2023    REITCPERCENT 1.6 11/13/2023    REITCPERCENT 1.4 10/19/2023    REITCPERCENT 1.3 09/21/2023    REITCPERCENT 1.6 08/17/2023    REITCPERCENT 1.3 07/13/2023    REITCPERCENT 0.8 05/24/2023    REITCPERCENT 0.9 04/26/2023    REITCPERCENT 1.3 03/29/2023    REITCPERCENT 0.8 12/29/2022    REITCPERCENT 0.5 09/21/2022    REITCPERCENT 0.7 06/22/2022    REITCPERCENT 1.0 03/23/2022    REITCPERCENT 0.9 12/22/2021    REITCPERCENT 0.8 09/30/2021    REITCPERCENT 0.7 07/08/2021    REITCPERCENT 0.7 04/08/2021    REITCPERCENT 0.8 03/11/2021    REITCPERCENT 1.3 02/11/2021    REITCPERCENT 1.7 01/28/2021    REITCPERCENT 3.4 (H) 01/21/2021    REITCPERCENT 4.7 (H) 01/14/2021    REITCPERCENT 6.5 (H) 01/07/2021    REITCPERCENT 3.7 (H) 01/04/2021    REITCPERCENT 1.7 01/02/2021     Lab Results   Component Value Date    RETHE 32.2 12/30/2024    RETHE 32.8 07/03/2024    RETHE 31.4 04/01/2024    RETHE 33.7 12/18/2023    RETHE 34.0 11/13/2023    RETHE 32.8 10/19/2023    RETHE 32.2 09/21/2023    RETHE 34.4 08/17/2023    RETHE 33.2 07/13/2023    RETHE 31.8 05/24/2023    RETHE 34.4 04/26/2023    RETHE 33.2 03/29/2023    RETHE 32.0 12/29/2022    RETHE 32.5 09/21/2022    RETHE 33.3 06/22/2022    RETHE 32.3 03/23/2022    RETHE 31.8 12/22/2021    RETHE 32.8 09/30/2021    RETHE 33.2 07/08/2021    RETHE 31.9 04/08/2021    RETHE 32.9 03/11/2021    RETHE 32.9 02/11/2021    RETHE 34.9 01/28/2021    RETHE 34.4 01/21/2021    RETHE 38.6 (H) 01/14/2021    RETHE 37.2 (H) 01/07/2021    RETHE 36.3 01/04/2021    RETHE 35.6 01/02/2021     Lab Results   Component Value Date    .7 07/03/2024    .3 (H) 12/18/2023    GUY 15.2 08/17/2023    GUY 45.6 01/28/2021    .6 (H)  01/02/2021     Lab Results   Component Value Date    SAT 19 12/18/2023    SAT 17 08/17/2023    SAT 45 01/02/2021     Soluble transferrin receptor  No results found for: \"STFRNR\"  Lab Results   Component Value Date    B12 632 01/03/2021     No results found for: \"MMA\"  Lab Results   Component Value Date    FOLIC 19.4 01/02/2021     Lab Results   Component Value Date    COPPER 99.8 01/02/2021     Lab Results   Component Value Date    ESRML 15 01/02/2021     Lab Results   Component Value Date    CRP <0.29 01/02/2021     Lab Results   Component Value Date     12/30/2024     07/03/2024     04/01/2024     (H) 12/18/2023     11/13/2023     10/19/2023     09/21/2023     08/17/2023     07/13/2023     05/24/2023     04/26/2023     03/29/2023     12/29/2022     09/21/2022     06/22/2022     03/23/2022     12/22/2021     09/30/2021     07/08/2021     04/08/2021     03/11/2021     02/11/2021     01/28/2021     01/21/2021     01/14/2021     01/11/2021     01/10/2021     01/09/2021     (H) 01/08/2021     (H) 01/07/2021     (H) 01/07/2021     (H) 01/05/2021     (H) 01/04/2021     (H) 01/03/2021     (H) 01/02/2021     (H) 01/01/2021     Lab Results   Component Value Date    HAPT 86.0 12/30/2024    HAPT 87.5 07/03/2024    HAPT 138.0 04/01/2024    HAPT 150.0 12/18/2023    HAPT 71.8 11/13/2023    HAPT 69.2 10/19/2023    HAPT 68.5 09/21/2023    HAPT 81.2 08/17/2023    HAPT 94.5 07/13/2023    HAPT 107.0 05/24/2023    HAPT 95.0 04/26/2023    HAPT 117.0 03/29/2023    HAPT 103.0 12/29/2022    HAPT 100.0 09/21/2022    HAPT 100.0 06/22/2022    HAPT 86.3 03/23/2022    HAPT 92.8 12/22/2021    HAPT 91.3 09/30/2021    HAPT 76.8 07/08/2021    HAPT 70.8 04/08/2021    HAPT 72.6 03/11/2021    HAPT 69.7  02/11/2021    HAPT 76.2 01/28/2021    HAPT 58.4 01/21/2021    HAPT 79.3 01/14/2021    HAPT <7.8 (L) 01/07/2021    HAPT <7.8 (L) 01/02/2021     No results found for: \"ELECTMS1\"  Lab Results   Component Value Date    KAPPALTCHN 3.110 (H) 01/02/2021      Lab Results   Component Value Date    LAMBDALTCH 1.813 01/02/2021     Lab Results   Component Value Date    KAPLAMRATIO 1.72 (H) 01/02/2021     Lab Results   Component Value Date    TSH 3.580 06/22/2022    TSH 1.220 04/08/2021    TSH 0.610 01/09/2021     Lab Results   Component Value Date    T4F 1.1 06/22/2022    T4F 1.0 01/09/2021     No results found for: \"T3TOT\"     Component      Latest Ref Rng & Units 1/3/2021   % PNH RBC      0.000 - 0.004 % 0.002   % P[facility] MONOCYTES      0.000 - 0.019 % 0.000   % PNH PMN      0.000 - 0.004 % 0.000   PCQFLJ50 ACTIVITY      >=61 % <5 (L)   HEMOSIDERIN, URINE      Absent Absent     Component      Latest Ref Rng & Units 1/2/2021   HELICOBACTER PYLORI AG FECAL      Negative Negative   HCV AB      Nonreactive  Nonreactive   ROSALES Poly       Negative   HIV Antigen Antibody Combo      Non-Reactive Non-Reactive     Component      Latest Ref Rng & Units 9/30/2021   SARS-CoV-2 Total Antibody       Reactive     Lab Results   Component Value Date    PFQUBM20XAQ >100.0 07/03/2024    MBYFHW53HLZ 88.1 04/01/2024    QIHBPK96FFI 95.4 12/18/2023    PWJUET87USS 96.4 11/13/2023    GSYCKE52TPD 84.8 10/19/2023    TDRYEV36CQI 89.3 09/21/2023    NOLEKR28UJK 85.2 08/17/2023    OJADLO88UFE >100.0 07/13/2023    EZFOKE16GNQ >100.0 05/24/2023    HNKZUP59AZV >100.0 04/26/2023    UVNATJ38BOC 93 03/29/2023    KXETLB91JQC >100 12/29/2022    FGJYGJ46ZPW 97 09/21/2022    SZVPHN16ODZ 67 06/22/2022    XCHYXD87SUN 83 03/23/2022    SXQIWZ33ZWH 73 12/22/2021    RVPCEM67QSQ 70 09/30/2021    JHHWFZ32QRV 62 07/08/2021    GVYUTA25XRX 72 04/08/2021    RFYGTG73QKR 75 03/11/2021    PQOGPG58KQI 80 02/11/2021    YXJZVB40EYP 83 01/28/2021    RBZPWI34BIG 88 01/21/2021    UABUAH64UEK  39 (L) 01/14/2021    GLBUSF29MPA <5 (L) 01/03/2021     Imaging:    U/S abd 1/4/21: CONCLUSION:  There is no splenomegaly or liver abnormality.  There is mild prominence right renal collecting system which is most likely an extrarenal pelvis.    Impression & Plan:     *acquired Thrombotic thrombocytopenic purpura (aTTP)  -s/p PEX x 5 1/7-1/11/21 with initiation of steroids and rituximab with good response.  Has weaned steroids as of 1/23/21.  Completed Rituximab weekly x4 1/8/21-1/28/21.  -No evidence of recurrence at this time, though waiting on repeat LSJBJX89 activity level today which is pending.  As long as her UOUOLD24 level remains above 10-20% we will continue surveillance monitoring only as below:   -CBC w/ diff, CMP, LDH, haptoglobin, reticulocyte count, and VEVVBT02 activity q6 mths  -I encouraged her to stay up-to-date with vaccines including influenza and COVID-19    *Normocytic anemia  -previously d/t hemolysis from TTP with resolution and recent ABBI related to pregnancy  -s/p IV iron replacement on 9/21/23 with improvement.  -Repeat CBC, LDH, hapto, reticulocyte count monitoring as above.      *left ear hearing loss d/t otosclerosis  -following with ENT, Dr. Cortney Kendall and is also seeing Dr. Mendoza at [place].  First attempt at stapedectomy was aborted due to facial nerve overlying stapes, a different surgical approach may be considered with Dr. Mendoza but [facility] plans to hold off on this for now.      RTC in 6 months     Andrew Garcia MD  Hematology/Medical Oncology  [facility]     ongoing continuity of care

## 2024-12-30 NOTE — PROGRESS NOTES
Education Record     Learner:  Patient     Disease / Diagnosis: TTP     Barriers / Limitations:  None                Comments:     Method:  Discussion                Comments:     General Topics:  Plan of care reviewed                Comments:     Outcome:  Shows understanding                Comments: 6 month MD f/up. Pt states she is fatigued. Denies any issues or concerns.

## 2025-01-04 LAB — ADAMTS13 ACTIVITY: >100 %

## 2025-06-04 ENCOUNTER — TELEPHONE (OUTPATIENT)
Dept: INTERNAL MEDICINE CLINIC | Facility: CLINIC | Age: 35
End: 2025-06-04

## 2025-06-04 DIAGNOSIS — Z13.220 ENCOUNTER FOR SCREENING FOR LIPID DISORDER: ICD-10-CM

## 2025-06-04 DIAGNOSIS — Z00.00 ROUTINE GENERAL MEDICAL EXAMINATION AT A HEALTH CARE FACILITY: Primary | ICD-10-CM

## 2025-06-04 DIAGNOSIS — Z13.29 SCREENING FOR THYROID DISORDER: ICD-10-CM

## 2025-06-04 NOTE — TELEPHONE ENCOUNTER
Patient called request labs prior to their annual physical.  Annual physical scheduled for   Future Appointments   Date Time Provider Department Center   8/6/2025 10:00 AM Zelda Padilla MD [facility]    Please order labs. Patient preferred lab is [facility]  Patient informed request was sent to clinical team.  Patient informed to fast for labs.  No callback required.

## 2025-06-23 ENCOUNTER — APPOINTMENT (OUTPATIENT)
Facility: LOCATION | Age: 35
End: 2025-06-23
Attending: INTERNAL MEDICINE
Payer: COMMERCIAL

## 2025-07-14 ENCOUNTER — OFFICE VISIT (OUTPATIENT)
Age: 35
End: 2025-07-14
Attending: INTERNAL MEDICINE
Payer: COMMERCIAL

## 2025-07-14 VITALS
SYSTOLIC BLOOD PRESSURE: 103 MMHG | DIASTOLIC BLOOD PRESSURE: 64 MMHG | TEMPERATURE: 99 F | RESPIRATION RATE: 16 BRPM | BODY MASS INDEX: 24 KG/M2 | HEART RATE: 86 BPM | WEIGHT: 125.81 LBS | OXYGEN SATURATION: 98 %

## 2025-07-14 DIAGNOSIS — D64.9 NORMOCYTIC ANEMIA: ICD-10-CM

## 2025-07-14 DIAGNOSIS — M31.19 TTP (THROMBOTIC THROMBOCYTOPENIC PURPURA) (HCC): Primary | ICD-10-CM

## 2025-07-14 LAB
ALBUMIN SERPL-MCNC: 4.2 G/DL (ref 3.2–4.8)
ALBUMIN/GLOB SERPL: 1.6 {RATIO} (ref 1–2)
ALP LIVER SERPL-CCNC: 61 U/L (ref 37–98)
ALT SERPL-CCNC: 19 U/L (ref 10–49)
ANION GAP SERPL CALC-SCNC: 7 MMOL/L (ref 0–18)
AST SERPL-CCNC: 19 U/L (ref ?–34)
BASOPHILS # BLD AUTO: 0.03 X10(3) UL (ref 0–0.2)
BASOPHILS NFR BLD AUTO: 0.4 %
BILIRUB SERPL-MCNC: 0.2 MG/DL (ref 0.3–1.2)
BUN BLD-MCNC: 9 MG/DL (ref 9–23)
CALCIUM BLD-MCNC: 8.8 MG/DL (ref 8.7–10.6)
CHLORIDE SERPL-SCNC: 108 MMOL/L (ref 98–112)
CO2 SERPL-SCNC: 27 MMOL/L (ref 21–32)
CREAT BLD-MCNC: 0.75 MG/DL (ref 0.55–1.02)
EGFRCR SERPLBLD CKD-EPI 2021: 106 ML/MIN/1.73M2 (ref 60–?)
EOSINOPHIL # BLD AUTO: 0.09 X10(3) UL (ref 0–0.7)
EOSINOPHIL NFR BLD AUTO: 1.3 %
ERYTHROCYTE [DISTWIDTH] IN BLOOD BY AUTOMATED COUNT: 13 %
FASTING STATUS PATIENT QL REPORTED: NO
GLOBULIN PLAS-MCNC: 2.7 G/DL (ref 2–3.5)
GLUCOSE BLD-MCNC: 150 MG/DL (ref 70–99)
HAPTOGLOB SERPL-MCNC: 91 MG/DL (ref 30–200)
HCT VFR BLD AUTO: 37.4 % (ref 35–48)
HGB BLD-MCNC: 13 G/DL (ref 12–16)
HGB RETIC QN AUTO: 30.8 PG (ref 28.2–36.6)
IMM GRANULOCYTES # BLD AUTO: 0.01 X10(3) UL (ref 0–1)
IMM GRANULOCYTES NFR BLD: 0.1 %
IMM RETICS NFR: 0 RATIO (ref 0.1–0.3)
LDH SERPL L TO P-CCNC: 146 U/L (ref 120–246)
LYMPHOCYTES # BLD AUTO: 2.3 X10(3) UL (ref 1–4)
LYMPHOCYTES NFR BLD AUTO: 33.8 %
MCH RBC QN AUTO: 28.1 PG (ref 26–34)
MCHC RBC AUTO-ENTMCNC: 34.8 G/DL (ref 31–37)
MCV RBC AUTO: 81 FL (ref 80–100)
MONOCYTES # BLD AUTO: 0.53 X10(3) UL (ref 0.1–1)
MONOCYTES NFR BLD AUTO: 7.8 %
NEUTROPHILS # BLD AUTO: 3.85 X10 (3) UL (ref 1.5–7.7)
NEUTROPHILS # BLD AUTO: 3.85 X10(3) UL (ref 1.5–7.7)
NEUTROPHILS NFR BLD AUTO: 56.6 %
OSMOLALITY SERPL CALC.SUM OF ELEC: 296 MOSM/KG (ref 275–295)
PLATELET # BLD AUTO: 280 10(3)UL (ref 150–450)
POTASSIUM SERPL-SCNC: 3.7 MMOL/L (ref 3.5–5.1)
PROT SERPL-MCNC: 6.9 G/DL (ref 5.7–8.2)
RBC # BLD AUTO: 4.62 X10(6)UL (ref 3.8–5.3)
RETICS # AUTO: 37 X10(3) UL (ref 22.5–147.5)
RETICS/RBC NFR AUTO: 0.8 % (ref 0.5–2.5)
SODIUM SERPL-SCNC: 142 MMOL/L (ref 136–145)
WBC # BLD AUTO: 6.8 X10(3) UL (ref 4–11)

## 2025-07-14 NOTE — PROGRESS NOTES
Hematology Clinic Follow Up Visit    Patient Name: Jeramy Amaya  Medical Record Number: [id number]   YOB: 1990   PCP: Dr. Zelda Padilla    Reason for Consultation:  Jeramy Amaya was seen today for the diagnosis of TTP    *TTP  -11/24/16- wbc 6.6, hgb 12.4, MCV 82, plt 252  -10/24/18- wbc 11.8, hgb 11.5, MCV 85, plt 266  -2/1/19- wbc 10.4, hgb 11.8, MCV 83, plt 253  -1/1/21- wbc 539, hgb 10.8, MCV 79, plt 15  -1/2/21- wbc 5.7, hgb 9.8, MCV 80, plt 10, , hapto <7, retic 1.7%, ROSALES neg                -> IVIG 1g/kg, dexamethasone 40mg daily X 4 doses started  -1/3/20- hgb 8.8, plt 15, , some schistos on smear                -repeated 2nd dose of IVIG 1g/kg  -1/3/21- JLBIRU20 activity <5%  -1/4/21- bmbx- normocellular with active trilineage hematopoiesis.  No increase in blasts or significant dysplastic changes.  -1/7/21- CBNNHI44 activity 25%, ZVRJGO77 inhibitor present at 1.7 BU, hgb 9.9, plt 141,   -1/7/21- 1/11/21- PEX daily x 5. Steroids resumed.  -1/8/21- 1/28/21- rituximab 375mg/m2 IV weekly x 4  -1/11/21- hgb 9.6, plt 290,   -1/14/21- hgb 10.5, plt 472, , GOHICH62 activity 39%  -1/21/21- UXFKCR05 activity 88%  -1/23/21- completed steroid taper  -1/28/21- hgb 11.7, plt 223, , NWTLAH67 activity 83%  -2/11/21- hgb 11.6, plt 335, , LGZEVQ67 activity 80%  -3/11/21- hgb 13.1, plt 306, , YJJJLK44 activity 75%  -4/8/21- hgb 13.0, plt 294, , GSSKJX09 activity 72%  -7/8/21- hgb 13.6, plt 273, , RKYXIA35 activity 62%  -9/30/21- hgb 13.2, plt 273, , RIXBIB24 activity 70%  -12/22/21- hgb 13.1, plt 254, , AFHRPH87 activity 73%  -3/23/22- hgb 13.7, plt 255, , FBUJNQ86 activity 83%  -6/22/22- hgb 13.6, plt 297, , MNHSNE46 activity 67%  -9/21/22- hgb 13.5, plt 244, , BTMURR72 activity 97%  -12/29/22- hgb 14.0, plt 288, , IVPBAD53 activity >100%   *-became pregnant  -3/29/23- hgb 12.6,  plt 305, , CGSGTC99 activity 93%  -23- hgb 13.0, plt 287, , XYUSYE64 activity >100%  -23- hgb 12.3, plt 286, , PHXOEW65 activity >100%  -23- hgb 11.5, plt 298, , YQTNFK98 activity >100%  -23- hgb 11.4, plt 285, , ASMTWL20 activity 85%, ferritin 15, TSAT 17%  -23- hgb 12.0, plt 254, , ZSHMBO14 activity 89%  -> IV INFeD 1000mg  -10/19/23- hgb 12.9, plt 234, , BAKSYJ42 activity 84%  -23- hgb 12.5, plt 242, , LTFWMY53 activity 96%  -23- *vaginal delivery of  daughter at full term  -23- hgb 14.7, plt 327, , ASZPDN56 activity 95%  -24- hgb 13.9, plt 311, , RVCVEZ10 activity 88%  -7/3/24- hgb 13.9, plt 281, , CGVFUF90 activity >100%  -24- hgb 13.7, plt 281, , WECNBS12 activity >100%  -24- hgb 13.0, plt 280, , LWBXXK79 activity PENDING    =============================  Interval events: Presents for follow-up.  She is doing well, she denies any new issues.  Energy has been good.  She has not had any recent fevers or infections.      She has not had any abnormal bleeding or bruising.  No leg swelling, increased dyspnea, or chest pain.  No headaches or mental status changes.    Past Medical History:  Past Medical History:    Conductive hearing loss in left ear    Hair loss    History of migraine    Intraventricular cyst of brain    Migraines    Otosclerosis    Otosclerosis of left ear    TTP (thrombotic thrombocytopenic purpura) (HCC)    Visual impairment    glasses     Past Surgical History:   Procedure Laterality Date    Exploration of middle ear Left 2022     Home Medications:   loratadine 10 MG Oral Tab Take 1 tablet (10 mg total) by mouth daily as needed.      Omega 3-6-9 Fatty Acids (OMEGA DHA OR) Take by mouth.      Prenatal Vit-Fe Fumarate-FA (PRENATAL ONE DAILY) 27-0.8 MG Oral Tab Take 1 tablet by mouth in the morning.      Cholecalciferol (VITAMIN D3) 2000  units Oral Chew Tab Chew 2,000 Units by mouth in the morning.       Allergies:   No Known Allergies    Psychosocial History:  Social History     Social History Narrative    , has 2 daughters ( and 3 y/o as of 2023).  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     Social History     Socioeconomic History    Marital status:    Tobacco Use    Smoking status: Never     Passive exposure: Never    Smokeless tobacco: Never   Vaping Use    Vaping status: Never Used   Substance and Sexual Activity    Alcohol use: No     Alcohol/week: 0.0 standard drinks of alcohol    Drug use: No    Sexual activity: Not Currently     Partners: Male   Other Topics Concern    Caffeine Concern Yes     Comment: 1-2 cups of tea but normally just 1 cup    Exercise Yes     Comment: no regular regimen    Seat Belt Yes   Social History Narrative    , has 2 daughters ( and 3 y/o as of 2023).  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     Social Drivers of Health     Food Insecurity: No Food Insecurity (2023)    Food Insecurity     Food Insecurity: Never true   Transportation Needs: No Transportation Needs (2023)    Transportation Needs     Lack of Transportation: No   Housing Stability: Low Risk  (2023)    Housing Stability     Housing Instability: No     Family Medical History:  Family History   Problem Relation Age of Onset    Hypertension Paternal Grandmother     Diabetes Paternal Grandfather     Lipids Father     Cancer Neg     Blood Disorder Neg      Review of Systems:  A 10-point ROS was done with pertinent positives and negative per the HPI    Vital Signs:  Height: --  Weight: 57.1 kg (125 lb 12.8 oz) (1541)  BSA (Calculated - sq m): --  Pulse: 86 (1541)  BP: 103/64 (1541)  Temp: 98.6 °F (37 °C) (1541)  Do Not Use - Resp Rate: --  SpO2: 98 % (1541)     Wt Readings from Last 6 Encounters:   25 57.1 kg (125 lb 12.8  oz)   12/30/24 57.8 kg (127 lb 6.4 oz)   07/11/24 55.8 kg (123 lb)   07/03/24 56.2 kg (124 lb)   04/01/24 59.9 kg (132 lb)   01/05/24 61.2 kg (135 lb)     Physical Examination:  General: Patient is alert and oriented, no distress  Psych: Mood and affect are appropriate  Eyes: EOMI  ENT: 1 small hyperpigmented petechial appearing lesion at right soft palate that has been there for years and is unchanged.  No wet purpura or palatal petechia noted.  CV: regular rate and rhythm, no murmurs, no LE edema  Respiratory: Lungs clear to auscultation bilaterally  GI/Abd: Soft, non-tender with normoactive bowel sounds, no hepatosplenomegaly  Neurological: Grossly intact   Lymphatics: No palpable lymphadenopathy  Skin: No petechiae.     Laboratory:  Lab Results   Component Value Date    WBC 6.8 07/14/2025    WBC 12.6 (H) 12/30/2024    WBC 8.6 07/03/2024    HGB 13.0 07/14/2025    HGB 13.7 12/30/2024    HGB 13.9 07/03/2024    HCT 37.4 07/14/2025    MCV 81.0 07/14/2025    MCH 28.1 07/14/2025    MCHC 34.8 07/14/2025    RDW 13.0 07/14/2025    .0 07/14/2025    .0 12/30/2024    .0 07/03/2024     Lab Results   Component Value Date     (H) 07/14/2025    BUN 9 07/14/2025    BUNCREA 16.7 07/08/2021    CREATSERUM 0.75 07/14/2025    CREATSERUM 0.74 12/30/2024    CREATSERUM 0.80 07/03/2024    ANIONGAP 7 07/14/2025     11/24/2016    GFRNAA 103 06/22/2022    GFRAA 119 06/22/2022    CA 8.8 07/14/2025    OSMOCALC 296 (H) 07/14/2025    ALKPHO 61 07/14/2025    AST 19 07/14/2025    ALT 19 07/14/2025    BILT 0.2 (L) 07/14/2025    TP 6.9 07/14/2025    ALB 4.2 07/14/2025    GLOBULIN 2.7 07/14/2025     07/14/2025    K 3.7 07/14/2025     07/14/2025    CO2 27.0 07/14/2025     Lab Results   Component Value Date    PTT 37.1 (H) 01/01/2021    INR 1.02 01/01/2021     Lab Results   Component Value Date    REITCPERCENT 0.8 07/14/2025    REITCPERCENT 0.6 12/30/2024    REITCPERCENT 0.7 07/03/2024    REITCPERCENT 1.0  04/01/2024    REITCPERCENT 0.6 12/18/2023    REITCPERCENT 1.6 11/13/2023    REITCPERCENT 1.4 10/19/2023    REITCPERCENT 1.3 09/21/2023    REITCPERCENT 1.6 08/17/2023    REITCPERCENT 1.3 07/13/2023    REITCPERCENT 0.8 05/24/2023    REITCPERCENT 0.9 04/26/2023    REITCPERCENT 1.3 03/29/2023    REITCPERCENT 0.8 12/29/2022    REITCPERCENT 0.5 09/21/2022    REITCPERCENT 0.7 06/22/2022    REITCPERCENT 1.0 03/23/2022    REITCPERCENT 0.9 12/22/2021    REITCPERCENT 0.8 09/30/2021    REITCPERCENT 0.7 07/08/2021    REITCPERCENT 0.7 04/08/2021    REITCPERCENT 0.8 03/11/2021    REITCPERCENT 1.3 02/11/2021    REITCPERCENT 1.7 01/28/2021    REITCPERCENT 3.4 (H) 01/21/2021    REITCPERCENT 4.7 (H) 01/14/2021    REITCPERCENT 6.5 (H) 01/07/2021    REITCPERCENT 3.7 (H) 01/04/2021    REITCPERCENT 1.7 01/02/2021     Lab Results   Component Value Date    RETHE 30.8 07/14/2025    RETHE 32.2 12/30/2024    RETHE 32.8 07/03/2024    RETHE 31.4 04/01/2024    RETHE 33.7 12/18/2023    RETHE 34.0 11/13/2023    RETHE 32.8 10/19/2023    RETHE 32.2 09/21/2023    RETHE 34.4 08/17/2023    RETHE 33.2 07/13/2023    RETHE 31.8 05/24/2023    RETHE 34.4 04/26/2023    RETHE 33.2 03/29/2023    RETHE 32.0 12/29/2022    RETHE 32.5 09/21/2022    RETHE 33.3 06/22/2022    RETHE 32.3 03/23/2022    RETHE 31.8 12/22/2021    RETHE 32.8 09/30/2021    RETHE 33.2 07/08/2021    RETHE 31.9 04/08/2021    RETHE 32.9 03/11/2021    RETHE 32.9 02/11/2021    RETHE 34.9 01/28/2021    RETHE 34.4 01/21/2021    RETHE 38.6 (H) 01/14/2021    RETHE 37.2 (H) 01/07/2021    RETHE 36.3 01/04/2021    RETHE 35.6 01/02/2021     Lab Results   Component Value Date    .7 07/03/2024    .3 (H) 12/18/2023    GUY 15.2 08/17/2023    GUY 45.6 01/28/2021    .6 (H) 01/02/2021     Lab Results   Component Value Date    SAT 19 12/18/2023    SAT 17 08/17/2023    SAT 45 01/02/2021     Soluble transferrin receptor  No results found for: \"STFRNR\"  Lab Results   Component Value Date    B12  632 01/03/2021     No results found for: \"MMA\"  Lab Results   Component Value Date    FOLIC 19.4 01/02/2021     Lab Results   Component Value Date    COPPER 99.8 01/02/2021     Lab Results   Component Value Date    ESRML 15 01/02/2021     Lab Results   Component Value Date    CRP <0.29 01/02/2021     Lab Results   Component Value Date     07/14/2025     12/30/2024     07/03/2024     04/01/2024     (H) 12/18/2023     11/13/2023     10/19/2023     09/21/2023     08/17/2023     07/13/2023     05/24/2023     04/26/2023     03/29/2023     12/29/2022     09/21/2022     06/22/2022     03/23/2022     12/22/2021     09/30/2021     07/08/2021     04/08/2021     03/11/2021     02/11/2021     01/28/2021     01/21/2021     01/14/2021     01/11/2021     01/10/2021     01/09/2021     (H) 01/08/2021     (H) 01/07/2021     (H) 01/07/2021     (H) 01/05/2021     (H) 01/04/2021     (H) 01/03/2021     (H) 01/02/2021     (H) 01/01/2021     Lab Results   Component Value Date    HAPT 91.0 07/14/2025    HAPT 86.0 12/30/2024    HAPT 87.5 07/03/2024    HAPT 138.0 04/01/2024    HAPT 150.0 12/18/2023    HAPT 71.8 11/13/2023    HAPT 69.2 10/19/2023    HAPT 68.5 09/21/2023    HAPT 81.2 08/17/2023    HAPT 94.5 07/13/2023    HAPT 107.0 05/24/2023    HAPT 95.0 04/26/2023    HAPT 117.0 03/29/2023    HAPT 103.0 12/29/2022    HAPT 100.0 09/21/2022    HAPT 100.0 06/22/2022    HAPT 86.3 03/23/2022    HAPT 92.8 12/22/2021    HAPT 91.3 09/30/2021    HAPT 76.8 07/08/2021    HAPT 70.8 04/08/2021    HAPT 72.6 03/11/2021    HAPT 69.7 02/11/2021    HAPT 76.2 01/28/2021    HAPT 58.4 01/21/2021    HAPT 79.3 01/14/2021    HAPT <7.8 (L) 01/07/2021    HAPT <7.8 (L) 01/02/2021     No results found for:  \"ELECTMS1\"  Lab Results   Component Value Date    KAPPALTCHN 3.110 (H) 01/02/2021      Lab Results   Component Value Date    LAMBDALTCH 1.813 01/02/2021     Lab Results   Component Value Date    KAPLAMRATIO 1.72 (H) 01/02/2021     Lab Results   Component Value Date    TSH 3.580 06/22/2022    TSH 1.220 04/08/2021    TSH 0.610 01/09/2021     Lab Results   Component Value Date    T4F 1.1 06/22/2022    T4F 1.0 01/09/2021     No results found for: \"T3TOT\"     Component      Latest Ref Rng & Units 1/3/2021   % PNH RBC      0.000 - 0.004 % 0.002   % PNH MONOCYTES      0.000 - 0.019 % 0.000   % PNH PMN      0.000 - 0.004 % 0.000   LPYLJF43 ACTIVITY      >=61 % <5 (L)   HEMOSIDERIN, URINE      Absent Absent     Component      Latest Ref Rng & Units 1/2/2021   HELICOBACTER PYLORI AG FECAL      Negative Negative   HCV AB      Nonreactive  Nonreactive   ROSALES Poly       Negative   HIV Antigen Antibody Combo      Non-Reactive Non-Reactive     Component      Latest Ref Rng & Units 9/30/2021   SARS-CoV-2 Total Antibody       Reactive     Lab Results   Component Value Date    MMFKAL96AAY >100.0 12/30/2024    DOBRUW73SUX >100.0 07/03/2024    GPZIPQ42SST 88.1 04/01/2024    WFTILH78KQR 95.4 12/18/2023    YAIUGE70EJY 96.4 11/13/2023    SBMISQ37FQU 84.8 10/19/2023    GPGLXN64AAH 89.3 09/21/2023    SCVKSZ29VAC 85.2 08/17/2023    LTLWRE72PAV >100.0 07/13/2023    QKLABC47IDH >100.0 05/24/2023    BLZECG58JWL >100.0 04/26/2023    BHGYKP32SNQ 93 03/29/2023    ZXXHRG28YJJ >100 12/29/2022    FZGFGN42OGZ 97 09/21/2022    ERQUIS04DDQ 67 06/22/2022    SHXUMU95MER 83 03/23/2022    FUEPGT82XZH 73 12/22/2021    DFMPCP31DKY 70 09/30/2021    METJYP84HBW 62 07/08/2021    CNSTFW92HPJ 72 04/08/2021    ROBNVU53PMY 75 03/11/2021    UJMSPF69CXK 80 02/11/2021    LJBYPF24EJA 83 01/28/2021    IUYCSH55PPW 88 01/21/2021    PBJQMR61PKJ 39 (L) 01/14/2021    ZTRIMM02OYJ <5 (L) 01/03/2021     Imaging:    U/S abd 1/4/21: CONCLUSION:  There is no splenomegaly or liver  abnormality.  There is mild prominence right renal collecting system which is most likely an extrarenal pelvis.    Impression & Plan:     *acquired Thrombotic thrombocytopenic purpura (aTTP)  -s/p PEX x 5 1/7-1/11/21 with initiation of steroids and rituximab with good response.  Has weaned steroids as of 1/23/21.  Completed Rituximab weekly x4 1/8/21-1/28/21.  -No evidence of recurrence at this time, though waiting on repeat AVKAET17 activity level today which is pending.  As long as her UUUMWX48 level remains above 10-20% we will continue surveillance monitoring only as below:   -CBC w/ diff, CMP, LDH, haptoglobin, reticulocyte count, and BQMUWF22 activity q6 mths  -I encouraged her to stay up-to-date with vaccines including influenza and COVID-19    *Normocytic anemia  -previously d/t hemolysis from TTP with resolution and recent ABBI related to pregnancy  -s/p IV iron replacement on 9/21/23 with improvement.  -Repeat CBC, LDH, hapto, reticulocyte count monitoring as above.      *left ear hearing loss d/t otosclerosis  -following with ENT, Dr. Cortney Kendall and is also seeing Dr. Mendoza at [place].  First attempt at stapedectomy was aborted due to facial nerve overlying stapes, a different surgical approach may be considered with Dr. Mendoza but [facility] plans to hold off on this for now.      RTC in 6 months     Andrew Garcia MD  Hematology/Medical Oncology  [facility]     ongoing continuity of care

## 2025-07-14 NOTE — PROGRESS NOTES
Education Record     Learner:  Patient     Disease / Diagnosis: TTP     Barriers / Limitations:  None                Comments:     Method:  Discussion                Comments:     General Topics:  Plan of care reviewed                Comments:     Outcome:  Shows understanding                Comments: 6 month MD f/up. Pt states she is feeling well. Denies any bleeding bruising. No updates to med or hx.

## 2025-07-17 LAB — ADAMTS13 ACTIVITY: 96.6 %

## 2025-08-01 ENCOUNTER — TELEPHONE (OUTPATIENT)
Dept: INTERNAL MEDICINE CLINIC | Facility: CLINIC | Age: 35
End: 2025-08-01

## 2025-08-01 ENCOUNTER — LAB ENCOUNTER (OUTPATIENT)
Dept: LAB | Age: 35
End: 2025-08-01
Attending: INTERNAL MEDICINE

## 2025-08-01 DIAGNOSIS — R73.9 BLOOD GLUCOSE ELEVATED: Primary | ICD-10-CM

## 2025-08-01 DIAGNOSIS — R73.9 BLOOD GLUCOSE ELEVATED: ICD-10-CM

## 2025-08-01 DIAGNOSIS — Z00.00 ROUTINE GENERAL MEDICAL EXAMINATION AT A HEALTH CARE FACILITY: ICD-10-CM

## 2025-08-01 DIAGNOSIS — Z13.220 ENCOUNTER FOR SCREENING FOR LIPID DISORDER: ICD-10-CM

## 2025-08-01 DIAGNOSIS — Z13.29 SCREENING FOR THYROID DISORDER: ICD-10-CM

## 2025-08-01 LAB
CHOLEST SERPL-MCNC: 199 MG/DL (ref ?–200)
EST. AVERAGE GLUCOSE BLD GHB EST-MCNC: 108 MG/DL (ref 68–126)
FASTING PATIENT LIPID ANSWER: YES
HBA1C MFR BLD: 5.4 % (ref ?–5.7)
HDLC SERPL-MCNC: 71 MG/DL (ref 40–59)
LDLC SERPL CALC-MCNC: 107 MG/DL (ref ?–100)
NONHDLC SERPL-MCNC: 128 MG/DL (ref ?–130)
TRIGL SERPL-MCNC: 119 MG/DL (ref 30–149)
TSI SER-ACNC: 2.25 UIU/ML (ref 0.55–4.78)
VLDLC SERPL CALC-MCNC: 20 MG/DL (ref 0–30)

## 2025-08-01 PROCEDURE — 84443 ASSAY THYROID STIM HORMONE: CPT | Performed by: INTERNAL MEDICINE

## 2025-08-01 PROCEDURE — 80061 LIPID PANEL: CPT | Performed by: INTERNAL MEDICINE

## 2025-08-01 PROCEDURE — 83036 HEMOGLOBIN GLYCOSYLATED A1C: CPT | Performed by: INTERNAL MEDICINE

## 2025-08-06 ENCOUNTER — OFFICE VISIT (OUTPATIENT)
Dept: INTERNAL MEDICINE CLINIC | Facility: CLINIC | Age: 35
End: 2025-08-06

## 2025-08-06 VITALS
SYSTOLIC BLOOD PRESSURE: 102 MMHG | OXYGEN SATURATION: 99 % | BODY MASS INDEX: 24.03 KG/M2 | HEIGHT: 60.63 IN | RESPIRATION RATE: 18 BRPM | WEIGHT: 125.63 LBS | TEMPERATURE: 98 F | DIASTOLIC BLOOD PRESSURE: 62 MMHG | HEART RATE: 92 BPM

## 2025-08-06 DIAGNOSIS — R09.81 SINUS CONGESTION: ICD-10-CM

## 2025-08-06 DIAGNOSIS — R07.89 STERNUM PAIN: ICD-10-CM

## 2025-08-06 DIAGNOSIS — Z00.00 ROUTINE GENERAL MEDICAL EXAMINATION AT A HEALTH CARE FACILITY: Primary | ICD-10-CM

## 2025-08-06 DIAGNOSIS — Z86.32 HISTORY OF GESTATIONAL DIABETES: ICD-10-CM

## 2025-08-06 DIAGNOSIS — Z86.2 HISTORY OF TTP (THROMBOTIC THROMBOCYTOPENIC PURPURA): ICD-10-CM

## (undated) DEVICE — CATH IV ANGIOCATH 16G 1.88IN

## (undated) DEVICE — 3M™ STERI-DRAPE™ MEDIUM DRAPE WITH APERTURE 1030: Brand: STERI-DRAPE™

## (undated) DEVICE — HEAD AND NECK CDS-LF: Brand: MEDLINE INDUSTRIES, INC.

## (undated) DEVICE — 1010 S-DRAPE TOWEL DRAPE 10/BX: Brand: STERI-DRAPE™

## (undated) DEVICE — SOLUTION  .9 1000ML BTL

## (undated) DEVICE — STANDARD HYPODERMIC NEEDLE,POLYPROPYLENE HUB: Brand: MONOJECT

## (undated) DEVICE — PROBE 8225101 5PK STD PRASS FL TIP ROHS

## (undated) DEVICE — LIGHT HANDLE

## (undated) DEVICE — MEGADYNE E-Z CLEAN BLADE 2.75"

## (undated) DEVICE — ELECTRODE 8227410 PAIRED 2 CH SET ROHS

## (undated) DEVICE — PETRI DISH 31-019

## (undated) DEVICE — STERILE POLYISOPRENE POWDER-FREE SURGICAL GLOVES: Brand: PROTEXIS

## (undated) DEVICE — DRESSING GLASSCOCK EAR ADULT

## (undated) DEVICE — SLEEVE KENDALL SCD EXPRESS MED

## (undated) DEVICE — SYRINGE 10ML LL CONTRL SYRINGE

## (undated) DEVICE — SYRINGE 10ML LL TIP

## (undated) DEVICE — MINI-BLADE®: Brand: BEAVER®

## (undated) DEVICE — SUT PLAIN GUT 5-0 PC-1 1915G

## (undated) DEVICE — SPONGE STICK WITH PVP-I: Brand: KENDALL

## (undated) DEVICE — BLADE BEAVER EAR 7200

## (undated) DEVICE — WIPE WITH WICK AND CORNEAL SHIELD: Brand: MEROCEL

## (undated) DEVICE — MICRO KOVER: Brand: UNBRANDED

## (undated) DEVICE — STERILE COTTON BALLS LARGE 5/P: Brand: MEDLINE

## (undated) NOTE — LETTER
Date: 2/11/2021    Patient Name: Sonali Whiteside          To Whom it may concern: This letter has been written at the patient's request. The above patient was seen at the [facility] for treatment of a medical condition.     This pat

## (undated) NOTE — LETTER
Printed: 2021    Patient Name: Mamadou Herrera  : 1990   Medical Record #: [id number]    Consent to Treatment    I, Jeramy Hernandez, understand that I have been diagnosed with Thrombotic Thrombocytopenic Purpura (TTP).     I contact my oncologist, Dr. Bobby Solano or my Cancer Care Team at any time if I have questions, by calling [phone]. Additional written information will be given to me prior to start of therapy.     Additionally, I will receive a copy of this consen

## (undated) NOTE — ED AVS SNAPSHOT
[facility] in [address] [place] [address] Drive,[address] Floor    [address] [place] [address]    Phone:  [phone]    Fax:  [phone]           [address] [place] [address]   MRN: [id number]    Department:  [facility] END   Date of Visit:  4/15/ may not be covered by your plan. Please contact your insurance company to determine coverage for follow-up care and referrals. [facility]  [address] [facility], [address] [place] [address]  [phone] [address]  [address] prescription right away and begin taking the medication(s) as directed. If the Immediate Care Provider has read X-rays, these will be re-interpreted by a radiologist.  If there is a significant change in your reading, you will be contacted.  Please make Medicaid plans. To get signed up and covered, please call [phone] and ask to get set up for an insurance coverage that is in-network with [employer].         MyChart     Visit [employer]  You can access your MyChart to more actively manage

## (undated) NOTE — LETTER
04/12/19    To whom this may concern,    Hakeem Mukherjee is a current patient in our practice. She may return to work with no restrictions as of 03/16/19. If you have any questions or concerns please let our office know.     Sincerely,    Shannan Corona MD

## (undated) NOTE — LETTER
[address]  [address] [facility] [address], [address] [facility]  Authorization for Surgical Operation and Procedure     Date:___________                                                                                                         Time:__________ 4.   Should the need arise during my operation or immediate post-operative period, I also consent to the administration of blood and/or blood products.   Further, I understand that despite careful testing and screening of blood or blood products by linda 8.   I recognize that in the event my procedure results in extended X-Ray/fluoroscopy time, I may develop a skin reaction. 9.  If I have a Do Not Attempt Resuscitation (DNAR) order in place, that status will be suspended while in the operating room, proc 1. I, Jeramy Morin agree to be cared for by an anesthesiologist, who is specially trained to monitor me and give me medicine to put me to sleep or keep me comfortable during my procedure    I understand that my anesthesiologist is not an employe 5. My doctor has explained to me other choices available to me for my care (alternatives).   6. Pregnant Patients (“epidural”):  I understand that the risks of having an epidural (medicine given into my back to help control pain during labor), include itchi

## (undated) NOTE — LETTER
19        RE:  Patricia Valenzuela  :  1990      To Whom It May Concern:    Jeramy Gilliam  is under my care for pregnancy with an estimated delivery date of Estimated Date of Delivery: 19      Jeramy will be starting her

## (undated) NOTE — LETTER
[facility] IN [place]  [address] [address]  Dept: [phone]  Dept Fax: [phone]         November 21, 2018    Patient: Martinez Screen   YOB: 1990   Date of Visit: 11/21/2018       To Whom It [facility]

## (undated) NOTE — LETTER
[facility] [address], [address]  Consent for Procedure/Sedation    Date: 1/7/21    Time:       1. I authorize the performance upon Jeramy Vences the following:  Temporary Dialysis Catheter Insertion     2.  I authorize Printed: 2021   5:36 PM  Patient Name: Shabana Andrade        : 1990       Medical Record #: [id number]

## (undated) NOTE — MR AVS SNAPSHOT
[address], [facility] [address] [address] [phone]               Thank you for choosing us for your health care visit with Maurice Fletcher MD.  We are glad to serve you and happy to provide you with this summar Summaries. If you've been to the Emergency Department or your doctor's office, you can view your past visit information in [employer] by going to Visits < Visit Summaries. [employer] questions? Call [phone] for help.   [employer] is NOT to be used for urge

## (undated) NOTE — MR AVS SNAPSHOT
After Visit Summary   9/24/2019    Isaac Amaya    MRN: [id number]           Visit Information     Date & Time  9/24/2019  3:00 PM Provider  Saira Hardwick, [address] [facility] Dept.  Phone  [phone] for making healthy food choices    Enjoy your food, but eat less. Fully enjoy your food when eating. Don’t eat while distracted and slow down. Avoid over sized portions. Don’t eat while when you’re bored.      EAT THESE FOODS MORE OFTEN: EAT THE [email or web address]                   DO YOU KNOW WHERE TO GO? Injury & illness are never convenient. If you are dealing with a   non-emergency, consider your options before heading to an ER.          SAME DAY  APPOINTMENTS  Availa